# Patient Record
Sex: FEMALE | Race: WHITE | NOT HISPANIC OR LATINO | Employment: OTHER | URBAN - METROPOLITAN AREA
[De-identification: names, ages, dates, MRNs, and addresses within clinical notes are randomized per-mention and may not be internally consistent; named-entity substitution may affect disease eponyms.]

---

## 2017-05-11 ENCOUNTER — GENERIC CONVERSION - ENCOUNTER (OUTPATIENT)
Dept: OTHER | Facility: OTHER | Age: 62
End: 2017-05-11

## 2017-06-16 ENCOUNTER — ALLSCRIPTS OFFICE VISIT (OUTPATIENT)
Dept: OTHER | Facility: OTHER | Age: 62
End: 2017-06-16

## 2017-06-16 DIAGNOSIS — H33.22 SEROUS RETINAL DETACHMENT OF LEFT EYE: ICD-10-CM

## 2017-06-16 DIAGNOSIS — R73.01 IMPAIRED FASTING GLUCOSE: ICD-10-CM

## 2017-06-16 DIAGNOSIS — E78.5 HYPERLIPIDEMIA: ICD-10-CM

## 2017-06-26 ENCOUNTER — ALLSCRIPTS OFFICE VISIT (OUTPATIENT)
Dept: OTHER | Facility: OTHER | Age: 62
End: 2017-06-26

## 2017-06-26 LAB
BILIRUB UR QL STRIP: ABNORMAL
GLUCOSE (HISTORICAL): NEGATIVE
HGB UR QL STRIP.AUTO: ABNORMAL
KETONES UR STRIP-MCNC: NEGATIVE MG/DL
LEUKOCYTE ESTERASE UR QL STRIP: ABNORMAL
NITRITE UR QL STRIP: NEGATIVE
PH UR STRIP.AUTO: 5.5 [PH]
PROT UR STRIP-MCNC: NEGATIVE MG/DL
SP GR UR STRIP.AUTO: 1.02
UROBILINOGEN UR QL STRIP.AUTO: 0.2

## 2017-07-20 ENCOUNTER — LAB (OUTPATIENT)
Dept: LAB | Facility: CLINIC | Age: 62
End: 2017-07-20
Payer: COMMERCIAL

## 2017-07-20 ENCOUNTER — TRANSCRIBE ORDERS (OUTPATIENT)
Dept: LAB | Facility: CLINIC | Age: 62
End: 2017-07-20

## 2017-07-20 DIAGNOSIS — H33.22 SEROUS RETINAL DETACHMENT OF LEFT EYE: ICD-10-CM

## 2017-07-20 DIAGNOSIS — E78.5 HYPERLIPIDEMIA: ICD-10-CM

## 2017-07-20 DIAGNOSIS — R73.01 IMPAIRED FASTING GLUCOSE: ICD-10-CM

## 2017-07-20 LAB
ALBUMIN SERPL BCP-MCNC: 3.9 G/DL (ref 3.5–5)
ALP SERPL-CCNC: 71 U/L (ref 46–116)
ALT SERPL W P-5'-P-CCNC: 25 U/L (ref 12–78)
ANION GAP SERPL CALCULATED.3IONS-SCNC: 4 MMOL/L (ref 4–13)
AST SERPL W P-5'-P-CCNC: 14 U/L (ref 5–45)
BASOPHILS # BLD AUTO: 0.04 THOUSANDS/ΜL (ref 0–0.1)
BASOPHILS NFR BLD AUTO: 1 % (ref 0–1)
BILIRUB SERPL-MCNC: 1.47 MG/DL (ref 0.2–1)
BUN SERPL-MCNC: 18 MG/DL (ref 5–25)
CALCIUM SERPL-MCNC: 9.1 MG/DL (ref 8.3–10.1)
CHLORIDE SERPL-SCNC: 101 MMOL/L (ref 100–108)
CHOLEST SERPL-MCNC: 156 MG/DL (ref 50–200)
CO2 SERPL-SCNC: 31 MMOL/L (ref 21–32)
CREAT SERPL-MCNC: 0.69 MG/DL (ref 0.6–1.3)
EOSINOPHIL # BLD AUTO: 0.2 THOUSAND/ΜL (ref 0–0.61)
EOSINOPHIL NFR BLD AUTO: 2 % (ref 0–6)
ERYTHROCYTE [DISTWIDTH] IN BLOOD BY AUTOMATED COUNT: 14.5 % (ref 11.6–15.1)
EST. AVERAGE GLUCOSE BLD GHB EST-MCNC: 154 MG/DL
GFR SERPL CREATININE-BSD FRML MDRD: >60 ML/MIN/1.73SQ M
GLUCOSE P FAST SERPL-MCNC: 123 MG/DL (ref 65–99)
HBA1C MFR BLD: 7 % (ref 4.2–6.3)
HCT VFR BLD AUTO: 42 % (ref 34.8–46.1)
HDLC SERPL-MCNC: 38 MG/DL (ref 40–60)
HGB BLD-MCNC: 14.1 G/DL (ref 11.5–15.4)
LDLC SERPL CALC-MCNC: 70 MG/DL (ref 0–100)
LYMPHOCYTES # BLD AUTO: 2.73 THOUSANDS/ΜL (ref 0.6–4.47)
LYMPHOCYTES NFR BLD AUTO: 33 % (ref 14–44)
MCH RBC QN AUTO: 29.3 PG (ref 26.8–34.3)
MCHC RBC AUTO-ENTMCNC: 33.6 G/DL (ref 31.4–37.4)
MCV RBC AUTO: 87 FL (ref 82–98)
MONOCYTES # BLD AUTO: 0.54 THOUSAND/ΜL (ref 0.17–1.22)
MONOCYTES NFR BLD AUTO: 7 % (ref 4–12)
NEUTROPHILS # BLD AUTO: 4.7 THOUSANDS/ΜL (ref 1.85–7.62)
NEUTS SEG NFR BLD AUTO: 57 % (ref 43–75)
NRBC BLD AUTO-RTO: 0 /100 WBCS
PLATELET # BLD AUTO: 200 THOUSANDS/UL (ref 149–390)
PMV BLD AUTO: 12.1 FL (ref 8.9–12.7)
POTASSIUM SERPL-SCNC: 4.5 MMOL/L (ref 3.5–5.3)
PROT SERPL-MCNC: 7.3 G/DL (ref 6.4–8.2)
RBC # BLD AUTO: 4.81 MILLION/UL (ref 3.81–5.12)
SODIUM SERPL-SCNC: 136 MMOL/L (ref 136–145)
TRIGL SERPL-MCNC: 239 MG/DL
WBC # BLD AUTO: 8.22 THOUSAND/UL (ref 4.31–10.16)

## 2017-07-20 PROCEDURE — 83036 HEMOGLOBIN GLYCOSYLATED A1C: CPT

## 2017-07-20 PROCEDURE — 85025 COMPLETE CBC W/AUTO DIFF WBC: CPT

## 2017-07-20 PROCEDURE — 80053 COMPREHEN METABOLIC PANEL: CPT

## 2017-07-20 PROCEDURE — 80061 LIPID PANEL: CPT

## 2017-07-20 PROCEDURE — 36415 COLL VENOUS BLD VENIPUNCTURE: CPT

## 2017-07-26 ENCOUNTER — ALLSCRIPTS OFFICE VISIT (OUTPATIENT)
Dept: OTHER | Facility: OTHER | Age: 62
End: 2017-07-26

## 2017-07-26 DIAGNOSIS — R39.9 UNSPECIFIED SYMPTOMS AND SIGNS INVOLVING THE GENITOURINARY SYSTEM: ICD-10-CM

## 2017-07-26 DIAGNOSIS — I10 ESSENTIAL (PRIMARY) HYPERTENSION: ICD-10-CM

## 2017-07-26 DIAGNOSIS — R73.01 IMPAIRED FASTING GLUCOSE: ICD-10-CM

## 2017-08-07 ENCOUNTER — APPOINTMENT (OUTPATIENT)
Dept: LAB | Facility: CLINIC | Age: 62
End: 2017-08-07
Payer: COMMERCIAL

## 2017-08-07 DIAGNOSIS — R39.9 UNSPECIFIED SYMPTOMS AND SIGNS INVOLVING THE GENITOURINARY SYSTEM: ICD-10-CM

## 2017-08-07 LAB
BACTERIA UR QL AUTO: ABNORMAL /HPF
BILIRUB UR QL STRIP: NEGATIVE
CLARITY UR: ABNORMAL
COLOR UR: YELLOW
GLUCOSE UR STRIP-MCNC: NEGATIVE MG/DL
HGB UR QL STRIP.AUTO: ABNORMAL
HYALINE CASTS #/AREA URNS LPF: ABNORMAL /LPF
KETONES UR STRIP-MCNC: NEGATIVE MG/DL
LEUKOCYTE ESTERASE UR QL STRIP: ABNORMAL
NITRITE UR QL STRIP: POSITIVE
NON-SQ EPI CELLS URNS QL MICRO: ABNORMAL /HPF
PH UR STRIP.AUTO: 6 [PH] (ref 4.5–8)
PROT UR STRIP-MCNC: ABNORMAL MG/DL
RBC #/AREA URNS AUTO: ABNORMAL /HPF
SP GR UR STRIP.AUTO: 1.02 (ref 1–1.03)
UROBILINOGEN UR QL STRIP.AUTO: 0.2 E.U./DL
WBC #/AREA URNS AUTO: ABNORMAL /HPF

## 2017-08-07 PROCEDURE — 87077 CULTURE AEROBIC IDENTIFY: CPT

## 2017-08-07 PROCEDURE — 87086 URINE CULTURE/COLONY COUNT: CPT

## 2017-08-07 PROCEDURE — 81001 URINALYSIS AUTO W/SCOPE: CPT

## 2017-08-07 PROCEDURE — 87186 SC STD MICRODIL/AGAR DIL: CPT

## 2017-08-09 LAB — BACTERIA UR CULT: NORMAL

## 2017-08-10 ENCOUNTER — GENERIC CONVERSION - ENCOUNTER (OUTPATIENT)
Dept: OTHER | Facility: OTHER | Age: 62
End: 2017-08-10

## 2017-08-14 ENCOUNTER — ALLSCRIPTS OFFICE VISIT (OUTPATIENT)
Dept: OTHER | Facility: OTHER | Age: 62
End: 2017-08-14

## 2017-08-24 RX ORDER — SIMVASTATIN 40 MG
40 TABLET ORAL
COMMUNITY
End: 2018-09-20 | Stop reason: SDUPTHER

## 2017-08-24 RX ORDER — LOSARTAN POTASSIUM AND HYDROCHLOROTHIAZIDE 25; 100 MG/1; MG/1
1 TABLET ORAL DAILY
COMMUNITY
End: 2018-07-03 | Stop reason: SDUPTHER

## 2017-08-24 RX ORDER — ATENOLOL 100 MG/1
50 TABLET ORAL DAILY
COMMUNITY
End: 2018-09-20 | Stop reason: SDUPTHER

## 2017-08-28 ENCOUNTER — ANESTHESIA EVENT (OUTPATIENT)
Dept: PERIOP | Facility: HOSPITAL | Age: 62
End: 2017-08-28
Payer: COMMERCIAL

## 2017-08-29 ENCOUNTER — HOSPITAL ENCOUNTER (OUTPATIENT)
Facility: HOSPITAL | Age: 62
Setting detail: OUTPATIENT SURGERY
Discharge: HOME | End: 2017-08-29
Attending: INTERNAL MEDICINE | Admitting: INTERNAL MEDICINE
Payer: COMMERCIAL

## 2017-08-29 ENCOUNTER — GENERIC CONVERSION - ENCOUNTER (OUTPATIENT)
Dept: OTHER | Facility: OTHER | Age: 62
End: 2017-08-29

## 2017-08-29 ENCOUNTER — ANESTHESIA (OUTPATIENT)
Dept: PERIOP | Facility: HOSPITAL | Age: 62
End: 2017-08-29
Payer: COMMERCIAL

## 2017-08-29 VITALS
DIASTOLIC BLOOD PRESSURE: 98 MMHG | BODY MASS INDEX: 39.28 KG/M2 | RESPIRATION RATE: 16 BRPM | TEMPERATURE: 98.2 F | SYSTOLIC BLOOD PRESSURE: 153 MMHG | WEIGHT: 265.21 LBS | OXYGEN SATURATION: 96 % | HEIGHT: 69 IN | HEART RATE: 69 BPM

## 2017-08-29 DIAGNOSIS — Z12.11 ENCOUNTER FOR SCREENING FOR MALIGNANT NEOPLASM OF COLON: ICD-10-CM

## 2017-08-29 PROCEDURE — 88305 TISSUE EXAM BY PATHOLOGIST: CPT | Performed by: INTERNAL MEDICINE

## 2017-08-29 RX ORDER — GLYCOPYRROLATE 0.2 MG/ML
INJECTION INTRAMUSCULAR; INTRAVENOUS AS NEEDED
Status: DISCONTINUED | OUTPATIENT
Start: 2017-08-29 | End: 2017-08-29 | Stop reason: SURG

## 2017-08-29 RX ORDER — SODIUM CHLORIDE, SODIUM LACTATE, POTASSIUM CHLORIDE, CALCIUM CHLORIDE 600; 310; 30; 20 MG/100ML; MG/100ML; MG/100ML; MG/100ML
100 INJECTION, SOLUTION INTRAVENOUS CONTINUOUS
Status: DISCONTINUED | OUTPATIENT
Start: 2017-08-29 | End: 2017-08-29 | Stop reason: HOSPADM

## 2017-08-29 RX ORDER — PROPOFOL 10 MG/ML
INJECTION, EMULSION INTRAVENOUS AS NEEDED
Status: DISCONTINUED | OUTPATIENT
Start: 2017-08-29 | End: 2017-08-29 | Stop reason: SURG

## 2017-08-29 RX ADMIN — PROPOFOL 30 MG: 10 INJECTION, EMULSION INTRAVENOUS at 10:33

## 2017-08-29 RX ADMIN — SODIUM CHLORIDE, POTASSIUM CHLORIDE, SODIUM LACTATE AND CALCIUM CHLORIDE: 600; 310; 30; 20 INJECTION, SOLUTION INTRAVENOUS at 09:57

## 2017-08-29 RX ADMIN — GLYCOPYRROLATE 0.2 MG: 0.2 INJECTION, SOLUTION INTRAMUSCULAR; INTRAVENOUS at 10:28

## 2017-08-29 RX ADMIN — PROPOFOL 30 MG: 10 INJECTION, EMULSION INTRAVENOUS at 10:38

## 2017-08-29 RX ADMIN — GLYCOPYRROLATE 0.2 MG: 0.2 INJECTION, SOLUTION INTRAMUSCULAR; INTRAVENOUS at 10:32

## 2017-08-29 RX ADMIN — SODIUM CHLORIDE, POTASSIUM CHLORIDE, SODIUM LACTATE AND CALCIUM CHLORIDE 100 ML/HR: 600; 310; 30; 20 INJECTION, SOLUTION INTRAVENOUS at 09:29

## 2017-08-29 RX ADMIN — PROPOFOL 20 MG: 10 INJECTION, EMULSION INTRAVENOUS at 10:26

## 2017-08-29 RX ADMIN — PROPOFOL 100 MG: 10 INJECTION, EMULSION INTRAVENOUS at 10:22

## 2017-08-30 ENCOUNTER — GENERIC CONVERSION - ENCOUNTER (OUTPATIENT)
Dept: OTHER | Facility: OTHER | Age: 62
End: 2017-08-30

## 2017-09-13 ENCOUNTER — GENERIC CONVERSION - ENCOUNTER (OUTPATIENT)
Dept: OTHER | Facility: OTHER | Age: 62
End: 2017-09-13

## 2017-10-20 ENCOUNTER — LAB (OUTPATIENT)
Dept: LAB | Facility: CLINIC | Age: 62
End: 2017-10-20
Payer: COMMERCIAL

## 2017-10-20 DIAGNOSIS — I10 ESSENTIAL (PRIMARY) HYPERTENSION: ICD-10-CM

## 2017-10-20 DIAGNOSIS — R73.01 IMPAIRED FASTING GLUCOSE: ICD-10-CM

## 2017-10-20 LAB
ALBUMIN SERPL BCP-MCNC: 4 G/DL (ref 3.5–5)
ALP SERPL-CCNC: 68 U/L (ref 46–116)
ALT SERPL W P-5'-P-CCNC: 29 U/L (ref 12–78)
ANION GAP SERPL CALCULATED.3IONS-SCNC: 6 MMOL/L (ref 4–13)
AST SERPL W P-5'-P-CCNC: 16 U/L (ref 5–45)
BASOPHILS # BLD AUTO: 0.03 THOUSANDS/ΜL (ref 0–0.1)
BASOPHILS NFR BLD AUTO: 1 % (ref 0–1)
BILIRUB SERPL-MCNC: 1.63 MG/DL (ref 0.2–1)
BUN SERPL-MCNC: 13 MG/DL (ref 5–25)
CALCIUM SERPL-MCNC: 8.8 MG/DL (ref 8.3–10.1)
CHLORIDE SERPL-SCNC: 100 MMOL/L (ref 100–108)
CHOLEST SERPL-MCNC: 150 MG/DL (ref 50–200)
CO2 SERPL-SCNC: 29 MMOL/L (ref 21–32)
CREAT SERPL-MCNC: 0.6 MG/DL (ref 0.6–1.3)
EOSINOPHIL # BLD AUTO: 0.15 THOUSAND/ΜL (ref 0–0.61)
EOSINOPHIL NFR BLD AUTO: 2 % (ref 0–6)
ERYTHROCYTE [DISTWIDTH] IN BLOOD BY AUTOMATED COUNT: 13.7 % (ref 11.6–15.1)
EST. AVERAGE GLUCOSE BLD GHB EST-MCNC: 143 MG/DL
GFR SERPL CREATININE-BSD FRML MDRD: 97 ML/MIN/1.73SQ M
GLUCOSE P FAST SERPL-MCNC: 102 MG/DL (ref 65–99)
HBA1C MFR BLD: 6.6 % (ref 4.2–6.3)
HCT VFR BLD AUTO: 44.1 % (ref 34.8–46.1)
HDLC SERPL-MCNC: 40 MG/DL (ref 40–60)
HGB BLD-MCNC: 15.2 G/DL (ref 11.5–15.4)
LDLC SERPL CALC-MCNC: 66 MG/DL (ref 0–100)
LYMPHOCYTES # BLD AUTO: 2.43 THOUSANDS/ΜL (ref 0.6–4.47)
LYMPHOCYTES NFR BLD AUTO: 38 % (ref 14–44)
MCH RBC QN AUTO: 29.9 PG (ref 26.8–34.3)
MCHC RBC AUTO-ENTMCNC: 34.5 G/DL (ref 31.4–37.4)
MCV RBC AUTO: 87 FL (ref 82–98)
MONOCYTES # BLD AUTO: 0.48 THOUSAND/ΜL (ref 0.17–1.22)
MONOCYTES NFR BLD AUTO: 7 % (ref 4–12)
NEUTROPHILS # BLD AUTO: 3.35 THOUSANDS/ΜL (ref 1.85–7.62)
NEUTS SEG NFR BLD AUTO: 52 % (ref 43–75)
NRBC BLD AUTO-RTO: 0 /100 WBCS
PLATELET # BLD AUTO: 214 THOUSANDS/UL (ref 149–390)
PMV BLD AUTO: 12 FL (ref 8.9–12.7)
POTASSIUM SERPL-SCNC: 3.7 MMOL/L (ref 3.5–5.3)
PROT SERPL-MCNC: 7.5 G/DL (ref 6.4–8.2)
RBC # BLD AUTO: 5.08 MILLION/UL (ref 3.81–5.12)
SODIUM SERPL-SCNC: 135 MMOL/L (ref 136–145)
TRIGL SERPL-MCNC: 220 MG/DL
WBC # BLD AUTO: 6.45 THOUSAND/UL (ref 4.31–10.16)

## 2017-10-20 PROCEDURE — 36415 COLL VENOUS BLD VENIPUNCTURE: CPT

## 2017-10-20 PROCEDURE — 80061 LIPID PANEL: CPT

## 2017-10-20 PROCEDURE — 83036 HEMOGLOBIN GLYCOSYLATED A1C: CPT

## 2017-10-20 PROCEDURE — 80053 COMPREHEN METABOLIC PANEL: CPT

## 2017-10-20 PROCEDURE — 85025 COMPLETE CBC W/AUTO DIFF WBC: CPT

## 2017-10-22 ENCOUNTER — GENERIC CONVERSION - ENCOUNTER (OUTPATIENT)
Dept: OTHER | Facility: OTHER | Age: 62
End: 2017-10-22

## 2017-10-30 ENCOUNTER — ALLSCRIPTS OFFICE VISIT (OUTPATIENT)
Dept: OTHER | Facility: OTHER | Age: 62
End: 2017-10-30

## 2017-10-30 DIAGNOSIS — I10 ESSENTIAL (PRIMARY) HYPERTENSION: ICD-10-CM

## 2017-10-30 DIAGNOSIS — R73.01 IMPAIRED FASTING GLUCOSE: ICD-10-CM

## 2017-10-31 NOTE — PROGRESS NOTES
Assessment  1  Benign essential hypertension (401 1) (I10)   2  Hyperlipidemia (272 4) (E78 5)   3  Impaired fasting glucose (790 21) (R73 01)    Plan  Benign essential hypertension    · (1) CBC/PLT/DIFF; Status:Active; Requested for:30Oct2017;    · (1) COMPREHENSIVE METABOLIC PANEL; Status:Active; Requested for:30Oct2017;    · (1) LIPID PANEL, FASTING; Status:Active; Requested for:30Oct2017;   Impaired fasting glucose    · (1) HEMOGLOBIN A1C; Status:Active; Requested for:30Oct2017;    · Follow-up visit in 3 months Evaluation and Treatment  Follow-up  Status: Hold For - Scheduling   Requested for: [id number]  Need for influenza vaccination    · Fluzone Quadrivalent Intramuscular Suspension; INJECT 0 5  ML Intramuscular; To Be  Done: [id number]    Discussion/Summary  Discussion Summary:   Chronic problems are stable  No changes in meds  Followup with specialists for her eye  Continue diet and exercise  Counseling Documentation With Imm: The patient was counseled regarding instructions for management,-- impressions  Medication SE Review and Pt Understands Tx: Possible side effects of new medications were reviewed with the patient/guardian today  The treatment plan was reviewed with the patient/guardian  The patient/guardian understands and agrees with the treatment plan   Self Referrals:   Self Referrals: No      Chief Complaint  Chief Complaint Free Text Note Form: Patient returns today for a routine follow up and to review labs  History of Present Illness  HPI: Patient comes in today for routine follow-up  She has been working diligently on her weight and her diet  She has lost a few pounds  Her blood pressure has come down  Her weight has come down  Cholesterol has come down and sugars have improved  Everything all overall is better  Denies any new complaints today  Review of Systems  Complete-Female:   Cardiovascular: no chest pain  Respiratory: no shortness of breath     Gastrointestinal: no abdominal pain  Active Problems  1  Arthritis (716 90) (M19 90)   2  Benign essential hypertension (401 1) (I10)   3  Detached retina, left (361 9) (H33 22)   4  Hyperlipidemia (272 4) (E78 5)   5  Impaired fasting glucose (790 21) (R73 01)   6  Screen for colon cancer (V76 51) (Z12 11)   7  Varicose veins of lower extremity (454 9) (I83 90)    Past Medical History  1  History of Acute non-recurrent maxillary sinusitis (461 0) (J01 00)   2  Cataract, left (366 9) (H26 9)   3  History of recurrent UTI (urinary tract infection) (V13 02) (Z87 440)   4  History of Preop testing (V72 84) (Z01 818)   5  History of UTI symptoms (788 99) (R39 9)  Active Problems And Past Medical History Reviewed: The active problems and past medical history were reviewed and updated today  Surgical History  1  History of Neuroplasty Decompression Median Nerve At Carpal Tunnel   2  History of Repair Of Retinal Detachment Left Eye    Family History  Mother    1  Family history of coronary artery disease (V17 3) (Z82 49)   2  Family history of diabetes mellitus (V18 0) (Z83 3)  Father    3  Family history of chronic obstructive pulmonary disease (V17 6) (Z82 5)  Daughter    4  Family history of malignant melanoma (V16 8) (Z80 8)  Brother    5  Family history of coronary artery disease (V17 3) (Z82 49)   6  Family history of valvular heart disease (V17 49) (Z82 49)  Family History    7  Denied: Family history of mental disorder   8  Denied: Family history of substance abuse    Social History   · Brushes teeth twice a day   · Dental care, regularly   ·    · Engages in instrumental music (E30 10) (Z91 89)   · Exercise: Yardwork   · Exercises regularly   · Never smoker   · No illicit drug use   · Occasional alcohol use   · Retired   · Two children    Current Meds   1  Atenolol 50 MG Oral Tablet; take 2 tablet daily  Requested for: 59COS2060; Last Rx:16Jun2017   Ordered   2  Losartan Potassium-HCTZ 100-25 MG Oral Tablet;  Take 1 tablet by mouth  daily; Therapy: (Recorded:30Oct2017) to Recorded   3  Simvastatin 40 MG Oral Tablet; Take 1 tablet daily; Therapy: (Recorded:30Oct2017) to Recorded  Medication List Reviewed: The medication list was reviewed and updated today  Allergies  1  No Known Drug Allergies    Vitals  Vital Signs    Recorded: 05QEG8711 08:15AM   Temperature 98 F   Heart Rate 89   Systolic 438 mm Hg   Diastolic 88 mm Hg   Height 5 ft 9 in   Weight 264 lb    BMI Calculated 38 99 kg/m2   BSA Calculated 2 32 m2   O2 Saturation 94     Physical Exam    Constitutional   General appearance: No acute distress, well appearing and well nourished  Results/Data  (1) CBC/PLT/DIFF 20Oct2017 10:24AM Ravichristian Valdez Order Number: [id number]     Test Name Result Flag Reference   WBC COUNT 6 45 Thousand/uL  4 31-10 16   RBC COUNT 5 08 Million/uL  3 81-5 12   HEMOGLOBIN 15 2 g/dL  11 5-15 4   HEMATOCRIT 44 1 %  34 8-46  1   MCV 87 fL  82-98   MCH 29 9 pg  26 8-34 3   MCHC 34 5 g/dL  31 4-37 4   RDW 13 7 %  11 6-15 1   MPV 12 0 fL  8 9-12 7   PLATELET COUNT 800 Thousands/uL  149-390   nRBC AUTOMATED 0 /100 WBCs     NEUTROPHILS RELATIVE PERCENT 52 %  43-75   LYMPHOCYTES RELATIVE PERCENT 38 %  14-44   MONOCYTES RELATIVE PERCENT 7 %  4-12   EOSINOPHILS RELATIVE PERCENT 2 %  0-6   BASOPHILS RELATIVE PERCENT 1 %  0-1   NEUTROPHILS ABSOLUTE COUNT 3 35 Thousands/? ??L  1 85-7 62   LYMPHOCYTES ABSOLUTE COUNT 2 43 Thousands/? ??L  0 60-4 47   MONOCYTES ABSOLUTE COUNT 0 48 Thousand/? ??L  0 17-1 22   EOSINOPHILS ABSOLUTE COUNT 0 15 Thousand/? ??L  0 00-0 61   BASOPHILS ABSOLUTE COUNT 0 03 Thousands/? ??L  0 00-0 10     (1) COMPREHENSIVE METABOLIC PANEL [id number] 77:23XW Ravi Valdez Order Number: [id number]     Test Name Result Flag Reference   SODIUM 135 mmol/L L 136-145   POTASSIUM 3 7 mmol/L  3 5-5 3   CHLORIDE 100 mmol/L  100-108   CARBON DIOXIDE 29 mmol/L  21-32   ANION GAP (CALC) 6 mmol/L  4-13   BLOOD UREA NITROGEN 13 mg/dL  5-25   CREATININE 0 60 mg/dL  0 60-1 30   Standardized to IDMS reference method   CALCIUM 8 8 mg/dL  8 3-10 1   BILI, TOTAL 1 63 mg/dL H 0 20-1 00   ALK PHOSPHATAS 68 U/L     ALT (SGPT) 29 U/L  12-78   Specimen collection should occur prior to Sulfasalazine and/or Sulfapyridine administration due to the potential for falsely depressed results  AST(SGOT) 16 U/L  5-45   Specimen collection should occur prior to Sulfasalazine administration due to the potential for falsely depressed results  ALBUMIN 4 0 g/dL  3 5-5 0   TOTAL PROTEIN 7 5 g/dL  6 4-8 2   eGFR 97 ml/min/1 73sq m     National Kidney Disease Education Program recommendations are as follows:  GFR calculation is accurate only with a steady state creatinine  Chronic Kidney disease less than 60 ml/min/1 73 sq  meters  Kidney failure less than 15 ml/min/1 73 sq  meters  GLUCOSE FASTING 102 mg/dL H 65-99   Specimen collection should occur prior to Sulfasalazine administration due to the potential for falsely depressed results  Specimen collection should occur prior to Sulfapyridine administration due to the potential for falsely elevated results  (1) LIPID PANEL, FASTING [id number] 10:24AM [facility] Order Number: [id number]     Test Name Result Flag Reference   CHOLESTEROL 150 mg/dL     HDL,DIRECT 40 mg/dL  40-60   Specimen collection should occur prior to Metamizole administration due to the potential for falsley depressed results  LDL CHOLESTEROL CALCULATED 66 mg/dL  0-100   Triglyceride:        Normal <150 mg/dl   Borderline High 150-199 mg/dl   High 200-499 mg/dl   Very High >499 mg/dl      Cholesterol:       Desirable <200 mg/dl    Borderline High 200-239 mg/dl    High >239 mg/dl      HDL Cholesterol:       High>59 mg/dL    Low <41 mg/dL      This screening LDL is a calculated result     It does not have the accuracy of the Direct Measured LDL in the monitoring of patients with hyperlipidemia and/or statin therapy  Direct Measure LDL (RGB567) must be ordered separately in these patients  TRIGLYCERIDES 220 mg/dL H <=150   Specimen collection should occur prior to N-Acetylcysteine or Metamizole administration due to the potential for falsely depressed results  (1) HEMOGLOBIN A1C 20Oct2017 10:24AM Velma Joselo Order Number: [id number]     Test Name Result Flag Reference   HEMOGLOBIN A1C 6 6 % H 4 2-6 3   EST  AVG  GLUCOSE 143 mg/dl       Health Management  Screen for colon cancer   COLONOSCOPY; every 5 years; Last 29Aug2017; Next Due: 29Aug2022;  Active    Signatures   Electronically signed by : Anastacia Umana MD; Oct 30 2017  8:48AM EST                       (Author)

## 2018-01-10 NOTE — RESULT NOTES
Verified Results  (1) CBC/PLT/DIFF [id number] 10:24AM Alejandra Foster Order Number: [id number]     Test Name Result Flag Reference   WBC COUNT 6 45 Thousand/uL  4 31-10 16   RBC COUNT 5 08 Million/uL  3 81-5 12   HEMOGLOBIN 15 2 g/dL  11 5-15 4   HEMATOCRIT 44 1 %  34 8-46  1   MCV 87 fL  82-98   MCH 29 9 pg  26 8-34 3   MCHC 34 5 g/dL  31 4-37 4   RDW 13 7 %  11 6-15 1   MPV 12 0 fL  8 9-12 7   PLATELET COUNT 880 Thousands/uL  149-390   nRBC AUTOMATED 0 /100 WBCs     NEUTROPHILS RELATIVE PERCENT 52 %  43-75   LYMPHOCYTES RELATIVE PERCENT 38 %  14-44   MONOCYTES RELATIVE PERCENT 7 %  4-12   EOSINOPHILS RELATIVE PERCENT 2 %  0-6   BASOPHILS RELATIVE PERCENT 1 %  0-1   NEUTROPHILS ABSOLUTE COUNT 3 35 Thousands/? ??L  1 85-7 62   LYMPHOCYTES ABSOLUTE COUNT 2 43 Thousands/? ??L  0 60-4 47   MONOCYTES ABSOLUTE COUNT 0 48 Thousand/? ??L  0 17-1 22   EOSINOPHILS ABSOLUTE COUNT 0 15 Thousand/? ??L  0 00-0 61   BASOPHILS ABSOLUTE COUNT 0 03 Thousands/? ??L  0 00-0 10     (1) COMPREHENSIVE METABOLIC PANEL [id number] 86:99BX Alejandra Foster Order Number: [id number]     Test Name Result Flag Reference   SODIUM 135 mmol/L L 136-145   POTASSIUM 3 7 mmol/L  3 5-5 3   CHLORIDE 100 mmol/L  100-108   CARBON DIOXIDE 29 mmol/L  21-32   ANION GAP (CALC) 6 mmol/L  4-13   BLOOD UREA NITROGEN 13 mg/dL  5-25   CREATININE 0 60 mg/dL  0 60-1 30   Standardized to IDMS reference method   CALCIUM 8 8 mg/dL  8 3-10 1   BILI, TOTAL 1 63 mg/dL H 0 20-1 00   ALK PHOSPHATAS 68 U/L     ALT (SGPT) 29 U/L  12-78   Specimen collection should occur prior to Sulfasalazine and/or Sulfapyridine administration due to the potential for falsely depressed results  AST(SGOT) 16 U/L  5-45   Specimen collection should occur prior to Sulfasalazine administration due to the potential for falsely depressed results     ALBUMIN 4 0 g/dL  3 5-5 0   TOTAL PROTEIN 7 5 g/dL  6 4-8 2   eGFR 97 ml/min/1 73sq m     National Kidney Disease Education Program recommendations are as follows:  GFR calculation is accurate only with a steady state creatinine  Chronic Kidney disease less than 60 ml/min/1 73 sq  meters  Kidney failure less than 15 ml/min/1 73 sq  meters  GLUCOSE FASTING 102 mg/dL H 65-99   Specimen collection should occur prior to Sulfasalazine administration due to the potential for falsely depressed results  Specimen collection should occur prior to Sulfapyridine administration due to the potential for falsely elevated results  (1) LIPID PANEL, FASTING [id number] 10:24AM [employer] Order Number: [id number]     Test Name Result Flag Reference   CHOLESTEROL 150 mg/dL     HDL,DIRECT 40 mg/dL  40-60   Specimen collection should occur prior to Metamizole administration due to the potential for falsley depressed results  LDL CHOLESTEROL CALCULATED 66 mg/dL  0-100   Triglyceride:        Normal <150 mg/dl   Borderline High 150-199 mg/dl   High 200-499 mg/dl   Very High >499 mg/dl      Cholesterol:       Desirable <200 mg/dl    Borderline High 200-239 mg/dl    High >239 mg/dl      HDL Cholesterol:       High>59 mg/dL    Low <41 mg/dL      This screening LDL is a calculated result  It does not have the accuracy of the Direct Measured LDL in the monitoring of patients with hyperlipidemia and/or statin therapy  Direct Measure LDL (ANQ942) must be ordered separately in these patients  TRIGLYCERIDES 220 mg/dL H <=150   Specimen collection should occur prior to N-Acetylcysteine or Metamizole administration due to the potential for falsely depressed results  (1) HEMOGLOBIN A1C 20Oct2017 10:24AM [employer] Order Number: [id number]     Test Name Result Flag Reference   HEMOGLOBIN A1C 6 6 % H 4 2-6 3   EST  AVG   GLUCOSE 143 mg/dl

## 2018-01-12 VITALS
OXYGEN SATURATION: 97 % | HEIGHT: 69 IN | BODY MASS INDEX: 40.14 KG/M2 | DIASTOLIC BLOOD PRESSURE: 85 MMHG | WEIGHT: 271 LBS | HEART RATE: 76 BPM | SYSTOLIC BLOOD PRESSURE: 125 MMHG

## 2018-01-13 VITALS
BODY MASS INDEX: 39.55 KG/M2 | SYSTOLIC BLOOD PRESSURE: 140 MMHG | DIASTOLIC BLOOD PRESSURE: 88 MMHG | HEART RATE: 68 BPM | OXYGEN SATURATION: 98 % | TEMPERATURE: 97.9 F | HEIGHT: 69 IN | WEIGHT: 267 LBS

## 2018-01-13 VITALS
BODY MASS INDEX: 39.1 KG/M2 | HEIGHT: 69 IN | OXYGEN SATURATION: 94 % | DIASTOLIC BLOOD PRESSURE: 88 MMHG | WEIGHT: 264 LBS | HEART RATE: 89 BPM | TEMPERATURE: 98 F | SYSTOLIC BLOOD PRESSURE: 126 MMHG

## 2018-01-14 VITALS
DIASTOLIC BLOOD PRESSURE: 90 MMHG | HEIGHT: 69 IN | SYSTOLIC BLOOD PRESSURE: 140 MMHG | BODY MASS INDEX: 39.99 KG/M2 | HEART RATE: 70 BPM | OXYGEN SATURATION: 99 % | WEIGHT: 270 LBS

## 2018-03-16 ENCOUNTER — LAB (OUTPATIENT)
Dept: LAB | Facility: CLINIC | Age: 63
End: 2018-03-16
Payer: COMMERCIAL

## 2018-03-16 ENCOUNTER — TRANSCRIBE ORDERS (OUTPATIENT)
Dept: LAB | Facility: CLINIC | Age: 63
End: 2018-03-16

## 2018-03-16 DIAGNOSIS — E80.6 HYPERBILIRUBINEMIA: ICD-10-CM

## 2018-03-16 DIAGNOSIS — R73.01 IMPAIRED FASTING GLUCOSE: ICD-10-CM

## 2018-03-16 DIAGNOSIS — I10 ESSENTIAL (PRIMARY) HYPERTENSION: ICD-10-CM

## 2018-03-16 LAB
ALBUMIN SERPL BCP-MCNC: 4.1 G/DL (ref 3.5–5)
ALP SERPL-CCNC: 77 U/L (ref 46–116)
ALT SERPL W P-5'-P-CCNC: 28 U/L (ref 12–78)
ANION GAP SERPL CALCULATED.3IONS-SCNC: 6 MMOL/L (ref 4–13)
AST SERPL W P-5'-P-CCNC: 16 U/L (ref 5–45)
BASOPHILS # BLD AUTO: 0.04 THOUSANDS/ΜL (ref 0–0.1)
BASOPHILS NFR BLD AUTO: 1 % (ref 0–1)
BILIRUB SERPL-MCNC: 1.74 MG/DL (ref 0.2–1)
BUN SERPL-MCNC: 11 MG/DL (ref 5–25)
CALCIUM SERPL-MCNC: 8.8 MG/DL (ref 8.3–10.1)
CHLORIDE SERPL-SCNC: 100 MMOL/L (ref 100–108)
CHOLEST SERPL-MCNC: 149 MG/DL (ref 50–200)
CO2 SERPL-SCNC: 30 MMOL/L (ref 21–32)
CREAT SERPL-MCNC: 0.63 MG/DL (ref 0.6–1.3)
EOSINOPHIL # BLD AUTO: 0.11 THOUSAND/ΜL (ref 0–0.61)
EOSINOPHIL NFR BLD AUTO: 2 % (ref 0–6)
ERYTHROCYTE [DISTWIDTH] IN BLOOD BY AUTOMATED COUNT: 13.9 % (ref 11.6–15.1)
EST. AVERAGE GLUCOSE BLD GHB EST-MCNC: 131 MG/DL
GFR SERPL CREATININE-BSD FRML MDRD: 96 ML/MIN/1.73SQ M
GLUCOSE P FAST SERPL-MCNC: 111 MG/DL (ref 65–99)
HBA1C MFR BLD: 6.2 % (ref 4.2–6.3)
HCT VFR BLD AUTO: 43.7 % (ref 34.8–46.1)
HDLC SERPL-MCNC: 44 MG/DL (ref 40–60)
HGB BLD-MCNC: 15 G/DL (ref 11.5–15.4)
LDLC SERPL CALC-MCNC: 54 MG/DL (ref 0–100)
LYMPHOCYTES # BLD AUTO: 2.21 THOUSANDS/ΜL (ref 0.6–4.47)
LYMPHOCYTES NFR BLD AUTO: 33 % (ref 14–44)
MCH RBC QN AUTO: 29.8 PG (ref 26.8–34.3)
MCHC RBC AUTO-ENTMCNC: 34.3 G/DL (ref 31.4–37.4)
MCV RBC AUTO: 87 FL (ref 82–98)
MONOCYTES # BLD AUTO: 0.48 THOUSAND/ΜL (ref 0.17–1.22)
MONOCYTES NFR BLD AUTO: 7 % (ref 4–12)
NEUTROPHILS # BLD AUTO: 3.93 THOUSANDS/ΜL (ref 1.85–7.62)
NEUTS SEG NFR BLD AUTO: 57 % (ref 43–75)
NRBC BLD AUTO-RTO: 0 /100 WBCS
PLATELET # BLD AUTO: 201 THOUSANDS/UL (ref 149–390)
PMV BLD AUTO: 12.5 FL (ref 8.9–12.7)
POTASSIUM SERPL-SCNC: 4 MMOL/L (ref 3.5–5.3)
PROT SERPL-MCNC: 7.6 G/DL (ref 6.4–8.2)
RBC # BLD AUTO: 5.04 MILLION/UL (ref 3.81–5.12)
SODIUM SERPL-SCNC: 136 MMOL/L (ref 136–145)
TRIGL SERPL-MCNC: 253 MG/DL
WBC # BLD AUTO: 6.77 THOUSAND/UL (ref 4.31–10.16)

## 2018-03-16 PROCEDURE — 80053 COMPREHEN METABOLIC PANEL: CPT

## 2018-03-16 PROCEDURE — 83036 HEMOGLOBIN GLYCOSYLATED A1C: CPT

## 2018-03-16 PROCEDURE — 80061 LIPID PANEL: CPT

## 2018-03-16 PROCEDURE — 85025 COMPLETE CBC W/AUTO DIFF WBC: CPT

## 2018-03-20 ENCOUNTER — OFFICE VISIT (OUTPATIENT)
Dept: INTERNAL MEDICINE CLINIC | Facility: CLINIC | Age: 63
End: 2018-03-20
Payer: COMMERCIAL

## 2018-03-20 ENCOUNTER — TELEPHONE (OUTPATIENT)
Dept: INTERNAL MEDICINE CLINIC | Facility: CLINIC | Age: 63
End: 2018-03-20

## 2018-03-20 ENCOUNTER — LAB (OUTPATIENT)
Dept: LAB | Facility: CLINIC | Age: 63
End: 2018-03-20
Payer: COMMERCIAL

## 2018-03-20 VITALS
HEIGHT: 69 IN | RESPIRATION RATE: 18 BRPM | BODY MASS INDEX: 38.16 KG/M2 | DIASTOLIC BLOOD PRESSURE: 82 MMHG | SYSTOLIC BLOOD PRESSURE: 148 MMHG | HEART RATE: 68 BPM | OXYGEN SATURATION: 97 % | WEIGHT: 257.6 LBS

## 2018-03-20 DIAGNOSIS — E78.2 MIXED HYPERLIPIDEMIA: ICD-10-CM

## 2018-03-20 DIAGNOSIS — H33.22 DETACHED RETINA, LEFT: ICD-10-CM

## 2018-03-20 DIAGNOSIS — I10 BENIGN ESSENTIAL HYPERTENSION: Primary | ICD-10-CM

## 2018-03-20 DIAGNOSIS — R73.01 IMPAIRED FASTING GLUCOSE: ICD-10-CM

## 2018-03-20 DIAGNOSIS — E80.6 HYPERBILIRUBINEMIA: ICD-10-CM

## 2018-03-20 PROBLEM — E78.5 HYPERLIPIDEMIA: Status: ACTIVE | Noted: 2017-06-16

## 2018-03-20 PROBLEM — M19.90 ARTHRITIS: Status: ACTIVE | Noted: 2017-06-16

## 2018-03-20 PROBLEM — I83.90 VARICOSE VEINS OF LOWER EXTREMITY: Status: ACTIVE | Noted: 2017-06-16

## 2018-03-20 LAB
ALBUMIN SERPL BCP-MCNC: 4 G/DL (ref 3.5–5)
ALP SERPL-CCNC: 74 U/L (ref 46–116)
ALT SERPL W P-5'-P-CCNC: 28 U/L (ref 12–78)
AST SERPL W P-5'-P-CCNC: 17 U/L (ref 5–45)
BILIRUB DIRECT SERPL-MCNC: 0.29 MG/DL (ref 0–0.2)
BILIRUB SERPL-MCNC: 1.6 MG/DL (ref 0.2–1)
GGT SERPL-CCNC: 18 U/L (ref 5–85)
PROT SERPL-MCNC: 7.5 G/DL (ref 6.4–8.2)

## 2018-03-20 PROCEDURE — 82977 ASSAY OF GGT: CPT

## 2018-03-20 PROCEDURE — 99214 OFFICE O/P EST MOD 30 MIN: CPT | Performed by: INTERNAL MEDICINE

## 2018-03-20 PROCEDURE — 36415 COLL VENOUS BLD VENIPUNCTURE: CPT

## 2018-03-20 PROCEDURE — 80076 HEPATIC FUNCTION PANEL: CPT

## 2018-03-20 NOTE — TELEPHONE ENCOUNTER
Patient was checking out and mention blood work orders to be put into the system so she can complete them prior to her next visit in 6 months       So just whenever you have a moment

## 2018-03-20 NOTE — PROGRESS NOTES
Assessment/Plan:     Chronic problems are stable  Continue present medications  Continue diet and exercise  Ordered labs for next visit  Will repeat bilirubin today, nonfasting  Followup scheduled per orders  No problem-specific Assessment & Plan notes found for this encounter  There are no diagnoses linked to this encounter  Subjective:      Patient ID: Mariann Manzano is a 60 y o  female  Patient comes in today for routine follow-up  Her numbers are much better  Her sugars are weight down  She has been working on her diet  Blood pressure is okay  She is taking her medicine as directed  Cholesterol was controlled  Her total bilirubin was up slightly  This blood work is fasting  She has been working on her diet so has probable being fasting heavily  She continues to follow with her ophthalmologist for her detached retina but that is stable at this point  Denies any new complaints today          ALLERGIES:  No Known Allergies    CURRENT MEDICATIONS:    Current Outpatient Prescriptions:     atenolol (TENORMIN) 100 mg tablet, Take 50 mg by mouth daily, Disp: , Rfl:     losartan-hydrochlorothiazide (HYZAAR) 100-25 MG per tablet, Take 1 tablet by mouth daily, Disp: , Rfl:     simvastatin (ZOCOR) 40 mg tablet, Take 40 mg by mouth daily at bedtime, Disp: , Rfl:     ACTIVE PROBLEM LIST:  Patient Active Problem List   Diagnosis    Arthritis    Benign essential hypertension    Detached retina, left    Hyperlipidemia    Impaired fasting glucose    Varicose veins of lower extremity       PAST MEDICAL HISTORY:  Past Medical History:   Diagnosis Date    Hyperlipidemia     Hypertension        PAST SURGICAL HISTORY:  Past Surgical History:   Procedure Laterality Date    CARPAL TUNNEL RELEASE      CATARACT EXTRACTION      EYE SURGERY      ID COLONOSCOPY FLX DX W/COLLJ SPEC WHEN PFRMD N/A 8/29/2017    Procedure: COLONOSCOPY;  Surgeon: Mj Caal MD;  Location: [facility]; Service: Gastroenterology    RETINAL DETACHMENT SURGERY         FAMILY HISTORY:  No family history on file  SOCIAL HISTORY:  Social History     Social History    Marital status:      Spouse name: N/A    Number of children: N/A    Years of education: N/A     Occupational History    Not on file  Social History Main Topics    Smoking status: Never Smoker    Smokeless tobacco: Not on file    Alcohol use Yes      Comment: RARELY    Drug use: No    Sexual activity: Not on file     Other Topics Concern    Not on file     Social History Narrative    No narrative on file       Review of Systems   Respiratory: Negative for shortness of breath  Cardiovascular: Negative for chest pain  Gastrointestinal: Negative for abdominal pain  Objective:  Vitals:    03/20/18 1014   BP: 148/82   BP Location: Left arm   Patient Position: Sitting   Cuff Size: Large   Pulse: 68   Resp: 18   SpO2: 97%   Weight: 117 kg (257 lb 9 6 oz)   Height: 5' 9" (1 753 m)        Physical Exam   Constitutional: She is oriented to person, place, and time  She appears well-developed and well-nourished  Cardiovascular: Normal rate, regular rhythm and normal heart sounds  Pulmonary/Chest: Effort normal and breath sounds normal    Abdominal: Soft  There is no tenderness  Neurological: She is alert and oriented to person, place, and time  Nursing note and vitals reviewed          RESULTS:    Recent Results (from the past 1008 hour(s))   CBC and differential    Collection Time: 03/16/18  9:50 AM   Result Value Ref Range    WBC 6 77 4 31 - 10 16 Thousand/uL    RBC 5 04 3 81 - 5 12 Million/uL    Hemoglobin 15 0 11 5 - 15 4 g/dL    Hematocrit 43 7 34 8 - 46 1 %    MCV 87 82 - 98 fL    MCH 29 8 26 8 - 34 3 pg    MCHC 34 3 31 4 - 37 4 g/dL    RDW 13 9 11 6 - 15 1 %    MPV 12 5 8 9 - 12 7 fL    Platelets 684 169 - 473 Thousands/uL    nRBC 0 /100 WBCs    Neutrophils Relative 57 43 - 75 %    Lymphocytes Relative 33 14 - 44 % Monocytes Relative 7 4 - 12 %    Eosinophils Relative 2 0 - 6 %    Basophils Relative 1 0 - 1 %    Neutrophils Absolute 3 93 1 85 - 7 62 Thousands/µL    Lymphocytes Absolute 2 21 0 60 - 4 47 Thousands/µL    Monocytes Absolute 0 48 0 17 - 1 22 Thousand/µL    Eosinophils Absolute 0 11 0 00 - 0 61 Thousand/µL    Basophils Absolute 0 04 0 00 - 0 10 Thousands/µL   Comprehensive metabolic panel    Collection Time: 03/16/18  9:50 AM   Result Value Ref Range    Sodium 136 136 - 145 mmol/L    Potassium 4 0 3 5 - 5 3 mmol/L    Chloride 100 100 - 108 mmol/L    CO2 30 21 - 32 mmol/L    Anion Gap 6 4 - 13 mmol/L    BUN 11 5 - 25 mg/dL    Creatinine 0 63 0 60 - 1 30 mg/dL    Glucose, Fasting 111 (H) 65 - 99 mg/dL    Calcium 8 8 8 3 - 10 1 mg/dL    AST 16 5 - 45 U/L    ALT 28 12 - 78 U/L    Alkaline Phosphatase 77 46 - 116 U/L    Total Protein 7 6 6 4 - 8 2 g/dL    Albumin 4 1 3 5 - 5 0 g/dL    Total Bilirubin 1 74 (H) 0 20 - 1 00 mg/dL    eGFR 96 ml/min/1 73sq m   Lipid panel    Collection Time: 03/16/18  9:50 AM   Result Value Ref Range    Cholesterol 149 50 - 200 mg/dL    Triglycerides 253 (H) <=150 mg/dL    HDL, Direct 44 40 - 60 mg/dL    LDL Calculated 54 0 - 100 mg/dL   Hemoglobin A1c    Collection Time: 03/16/18  9:50 AM   Result Value Ref Range    Hemoglobin A1C 6 2 4 2 - 6 3 %     mg/dl

## 2018-05-27 DIAGNOSIS — I10 ESSENTIAL HYPERTENSION: Primary | ICD-10-CM

## 2018-05-28 RX ORDER — ATENOLOL 50 MG/1
TABLET ORAL
Qty: 180 TABLET | Refills: 1 | Status: SHIPPED | OUTPATIENT
Start: 2018-05-28 | End: 2018-09-20 | Stop reason: SDUPTHER

## 2018-07-03 DIAGNOSIS — I10 BENIGN ESSENTIAL HYPERTENSION: ICD-10-CM

## 2018-07-03 DIAGNOSIS — I10 BENIGN ESSENTIAL HYPERTENSION: Primary | ICD-10-CM

## 2018-07-03 RX ORDER — LOSARTAN POTASSIUM AND HYDROCHLOROTHIAZIDE 25; 100 MG/1; MG/1
1 TABLET ORAL DAILY
Qty: 90 TABLET | Refills: 1 | Status: SHIPPED | OUTPATIENT
Start: 2018-07-03 | End: 2018-09-20 | Stop reason: SDUPTHER

## 2018-07-03 RX ORDER — LOSARTAN POTASSIUM AND HYDROCHLOROTHIAZIDE 25; 100 MG/1; MG/1
1 TABLET ORAL DAILY
Qty: 30 TABLET | Refills: 5 | Status: SHIPPED | OUTPATIENT
Start: 2018-07-03 | End: 2018-07-03 | Stop reason: SDUPTHER

## 2018-09-20 ENCOUNTER — OFFICE VISIT (OUTPATIENT)
Dept: INTERNAL MEDICINE CLINIC | Facility: CLINIC | Age: 63
End: 2018-09-20
Payer: COMMERCIAL

## 2018-09-20 VITALS
RESPIRATION RATE: 16 BRPM | HEIGHT: 69 IN | BODY MASS INDEX: 37.71 KG/M2 | TEMPERATURE: 98 F | DIASTOLIC BLOOD PRESSURE: 86 MMHG | WEIGHT: 254.6 LBS | OXYGEN SATURATION: 97 % | SYSTOLIC BLOOD PRESSURE: 138 MMHG | HEART RATE: 57 BPM

## 2018-09-20 DIAGNOSIS — Z23 NEED FOR INFLUENZA VACCINATION: ICD-10-CM

## 2018-09-20 DIAGNOSIS — I10 BENIGN ESSENTIAL HYPERTENSION: ICD-10-CM

## 2018-09-20 DIAGNOSIS — R73.01 IMPAIRED FASTING GLUCOSE: ICD-10-CM

## 2018-09-20 DIAGNOSIS — E78.2 MIXED HYPERLIPIDEMIA: ICD-10-CM

## 2018-09-20 DIAGNOSIS — I10 ESSENTIAL HYPERTENSION: Primary | ICD-10-CM

## 2018-09-20 PROCEDURE — 90471 IMMUNIZATION ADMIN: CPT

## 2018-09-20 PROCEDURE — 3008F BODY MASS INDEX DOCD: CPT | Performed by: INTERNAL MEDICINE

## 2018-09-20 PROCEDURE — 99214 OFFICE O/P EST MOD 30 MIN: CPT | Performed by: INTERNAL MEDICINE

## 2018-09-20 PROCEDURE — 3079F DIAST BP 80-89 MM HG: CPT | Performed by: INTERNAL MEDICINE

## 2018-09-20 PROCEDURE — 1036F TOBACCO NON-USER: CPT | Performed by: INTERNAL MEDICINE

## 2018-09-20 PROCEDURE — 90682 RIV4 VACC RECOMBINANT DNA IM: CPT

## 2018-09-20 PROCEDURE — 3075F SYST BP GE 130 - 139MM HG: CPT | Performed by: INTERNAL MEDICINE

## 2018-09-20 RX ORDER — LOSARTAN POTASSIUM AND HYDROCHLOROTHIAZIDE 25; 100 MG/1; MG/1
1 TABLET ORAL DAILY
Qty: 90 TABLET | Refills: 1 | Status: SHIPPED | OUTPATIENT
Start: 2018-09-20 | End: 2019-01-04 | Stop reason: SDUPTHER

## 2018-09-20 RX ORDER — SIMVASTATIN 40 MG
40 TABLET ORAL
Qty: 90 TABLET | Refills: 1 | Status: SHIPPED | OUTPATIENT
Start: 2018-09-20 | End: 2019-01-04 | Stop reason: SDUPTHER

## 2018-09-20 RX ORDER — ATENOLOL 50 MG/1
100 TABLET ORAL DAILY
Qty: 180 TABLET | Refills: 1 | Status: SHIPPED | OUTPATIENT
Start: 2018-09-20 | End: 2019-03-07 | Stop reason: SDUPTHER

## 2018-09-20 NOTE — PROGRESS NOTES
Assessment/Plan:      Chronic problems appear stable but she is due for her blood work  There are still orders in there from March sewing encouraged her to get them done in the next week or 2  No medication changes for now  Encouraged diet and exercise  Return in about 6 months (around 3/20/2019)  No problem-specific Assessment & Plan notes found for this encounter  Diagnoses and all orders for this visit:    Essential hypertension  -     atenolol (TENORMIN) 50 mg tablet; Take 2 tablets (100 mg total) by mouth daily    Benign essential hypertension  -     losartan-hydrochlorothiazide (HYZAAR) 100-25 MG per tablet; Take 1 tablet by mouth daily    Mixed hyperlipidemia  -     simvastatin (ZOCOR) 40 mg tablet; Take 1 tablet (40 mg total) by mouth daily at bedtime    Impaired fasting glucose    Need for influenza vaccination  -     influenza vaccine, 3719-4563, quadrivalent, recombinant, PF, 0 5 mL, for patients 18 yr+ (FLUBLOK)          Subjective:      Patient ID: Zak Guerra is a 58 y o  female  Patient comes in today for routine follow-up  She states she is doing okay at this point  Her blood pressure is controlled  She is taking her medicine as directed  She did not get her blood work done but is trying to watch her diet for the cholesterol  Also trying to watch what sugar  That has been borderline  Her eye problems are doing better as well  She reports no new complaints  No new additions to her history          ALLERGIES:  No Known Allergies    CURRENT MEDICATIONS:    Current Outpatient Prescriptions:     atenolol (TENORMIN) 50 mg tablet, Take 2 tablets (100 mg total) by mouth daily, Disp: 180 tablet, Rfl: 1    losartan-hydrochlorothiazide (HYZAAR) 100-25 MG per tablet, Take 1 tablet by mouth daily, Disp: 90 tablet, Rfl: 1    simvastatin (ZOCOR) 40 mg tablet, Take 1 tablet (40 mg total) by mouth daily at bedtime, Disp: 90 tablet, Rfl: 1    ACTIVE PROBLEM LIST:  Patient Active Problem List   Diagnosis    Arthritis    Benign essential hypertension    Detached retina, left    Hyperlipidemia    Impaired fasting glucose    Varicose veins of lower extremity       PAST MEDICAL HISTORY:  Past Medical History:   Diagnosis Date    Cataract     07/16/2017    Hyperlipidemia     Hypertension        PAST SURGICAL HISTORY:  Past Surgical History:   Procedure Laterality Date    CARPAL TUNNEL RELEASE      CATARACT EXTRACTION Left     EYE SURGERY      NC COLONOSCOPY FLX DX W/COLLJ SPEC WHEN PFRMD N/A 8/29/2017    Procedure: COLONOSCOPY;  Surgeon: Joleen Love MD;  Location: [facility]; Service: Gastroenterology    RETINAL DETACHMENT SURGERY Left        FAMILY HISTORY:  Family History   Problem Relation Age of Onset    Coronary artery disease Mother     Diabetes Mother     COPD Father     Coronary artery disease Brother     Valvular heart disease Brother     Cancer Daughter        SOCIAL HISTORY:  Social History     Social History    Marital status:      Spouse name: N/A    Number of children: 2    Years of education: N/A     Occupational History           retired      Social History Main Topics    Smoking status: Never Smoker    Smokeless tobacco: Never Used    Alcohol use Yes      Comment: RARELY, glass of wine q 2 weeks     Drug use: No    Sexual activity: Yes     Partners: Male     Other Topics Concern    Not on file     Social History Narrative    Brushes teeth twice a day    Dental care    Engages in instrumental music     Exercise: Yard work     Exercises reg,            Review of Systems   Respiratory: Negative for shortness of breath  Cardiovascular: Negative for chest pain  Gastrointestinal: Negative for abdominal pain           Objective:  Vitals:    09/20/18 0817   BP: 138/86   BP Location: Left arm   Patient Position: Sitting   Pulse: 57   Resp: 16   Temp: 98 °F (36 7 °C)   TempSrc: Temporal   SpO2: 97%   Weight: 115 kg (254 lb 9 6 oz)   Height: 5' 9" (1 753 m)        Physical Exam   Constitutional: She is oriented to person, place, and time  She appears well-developed and well-nourished  Cardiovascular: Normal rate, regular rhythm and normal heart sounds  Pulmonary/Chest: Effort normal and breath sounds normal    Abdominal: Soft  There is no tenderness  Neurological: She is alert and oriented to person, place, and time  Nursing note and vitals reviewed  RESULTS:    No results found for this or any previous visit (from the past 1008 hour(s))  This note was created with voice recognition software  Phonic, grammatical and spelling errors may be present within the note as a result

## 2018-10-19 ENCOUNTER — TRANSCRIBE ORDERS (OUTPATIENT)
Dept: LAB | Facility: CLINIC | Age: 63
End: 2018-10-19

## 2018-10-19 ENCOUNTER — LAB (OUTPATIENT)
Dept: LAB | Facility: CLINIC | Age: 63
End: 2018-10-19
Payer: COMMERCIAL

## 2018-10-19 DIAGNOSIS — R73.01 IMPAIRED FASTING GLUCOSE: ICD-10-CM

## 2018-10-19 DIAGNOSIS — E78.2 MIXED HYPERLIPIDEMIA: ICD-10-CM

## 2018-10-19 DIAGNOSIS — I10 BENIGN ESSENTIAL HYPERTENSION: ICD-10-CM

## 2018-10-19 LAB
ALBUMIN SERPL BCP-MCNC: 3.8 G/DL (ref 3.5–5)
ALP SERPL-CCNC: 66 U/L (ref 46–116)
ALT SERPL W P-5'-P-CCNC: 27 U/L (ref 12–78)
ANION GAP SERPL CALCULATED.3IONS-SCNC: 7 MMOL/L (ref 4–13)
AST SERPL W P-5'-P-CCNC: 20 U/L (ref 5–45)
BASOPHILS # BLD AUTO: 0.05 THOUSANDS/ΜL (ref 0–0.1)
BASOPHILS NFR BLD AUTO: 1 % (ref 0–1)
BILIRUB SERPL-MCNC: 1.36 MG/DL (ref 0.2–1)
BUN SERPL-MCNC: 17 MG/DL (ref 5–25)
CALCIUM SERPL-MCNC: 9 MG/DL (ref 8.3–10.1)
CHLORIDE SERPL-SCNC: 102 MMOL/L (ref 100–108)
CHOLEST SERPL-MCNC: 143 MG/DL (ref 50–200)
CO2 SERPL-SCNC: 28 MMOL/L (ref 21–32)
CREAT SERPL-MCNC: 0.7 MG/DL (ref 0.6–1.3)
EOSINOPHIL # BLD AUTO: 0.1 THOUSAND/ΜL (ref 0–0.61)
EOSINOPHIL NFR BLD AUTO: 2 % (ref 0–6)
ERYTHROCYTE [DISTWIDTH] IN BLOOD BY AUTOMATED COUNT: 13.4 % (ref 11.6–15.1)
GFR SERPL CREATININE-BSD FRML MDRD: 92 ML/MIN/1.73SQ M
GLUCOSE P FAST SERPL-MCNC: 108 MG/DL (ref 65–99)
HCT VFR BLD AUTO: 43 % (ref 34.8–46.1)
HDLC SERPL-MCNC: 35 MG/DL (ref 40–60)
HGB BLD-MCNC: 14.3 G/DL (ref 11.5–15.4)
IMM GRANULOCYTES # BLD AUTO: 0.02 THOUSAND/UL (ref 0–0.2)
IMM GRANULOCYTES NFR BLD AUTO: 0 % (ref 0–2)
LDLC SERPL CALC-MCNC: 69 MG/DL (ref 0–100)
LYMPHOCYTES # BLD AUTO: 2.09 THOUSANDS/ΜL (ref 0.6–4.47)
LYMPHOCYTES NFR BLD AUTO: 33 % (ref 14–44)
MCH RBC QN AUTO: 29.4 PG (ref 26.8–34.3)
MCHC RBC AUTO-ENTMCNC: 33.3 G/DL (ref 31.4–37.4)
MCV RBC AUTO: 88 FL (ref 82–98)
MONOCYTES # BLD AUTO: 0.47 THOUSAND/ΜL (ref 0.17–1.22)
MONOCYTES NFR BLD AUTO: 7 % (ref 4–12)
NEUTROPHILS # BLD AUTO: 3.59 THOUSANDS/ΜL (ref 1.85–7.62)
NEUTS SEG NFR BLD AUTO: 57 % (ref 43–75)
NONHDLC SERPL-MCNC: 108 MG/DL
NRBC BLD AUTO-RTO: 0 /100 WBCS
PLATELET # BLD AUTO: 205 THOUSANDS/UL (ref 149–390)
PMV BLD AUTO: 12.4 FL (ref 8.9–12.7)
POTASSIUM SERPL-SCNC: 3.9 MMOL/L (ref 3.5–5.3)
PROT SERPL-MCNC: 7.1 G/DL (ref 6.4–8.2)
RBC # BLD AUTO: 4.87 MILLION/UL (ref 3.81–5.12)
SODIUM SERPL-SCNC: 137 MMOL/L (ref 136–145)
TRIGL SERPL-MCNC: 194 MG/DL
WBC # BLD AUTO: 6.32 THOUSAND/UL (ref 4.31–10.16)

## 2018-10-19 PROCEDURE — 80061 LIPID PANEL: CPT

## 2018-10-19 PROCEDURE — 83036 HEMOGLOBIN GLYCOSYLATED A1C: CPT

## 2018-10-19 PROCEDURE — 85025 COMPLETE CBC W/AUTO DIFF WBC: CPT

## 2018-10-19 PROCEDURE — 36415 COLL VENOUS BLD VENIPUNCTURE: CPT

## 2018-10-19 PROCEDURE — 80053 COMPREHEN METABOLIC PANEL: CPT

## 2018-10-20 LAB
EST. AVERAGE GLUCOSE BLD GHB EST-MCNC: 126 MG/DL
HBA1C MFR BLD: 6 % (ref 4.2–6.3)

## 2018-11-24 DIAGNOSIS — I10 ESSENTIAL HYPERTENSION: ICD-10-CM

## 2018-11-25 RX ORDER — ATENOLOL 50 MG/1
TABLET ORAL
Qty: 180 TABLET | Refills: 1 | Status: SHIPPED | OUTPATIENT
Start: 2018-11-25 | End: 2019-01-04 | Stop reason: ALTCHOICE

## 2019-01-04 ENCOUNTER — TELEPHONE (OUTPATIENT)
Dept: INTERNAL MEDICINE CLINIC | Facility: CLINIC | Age: 64
End: 2019-01-04

## 2019-01-04 DIAGNOSIS — E78.2 MIXED HYPERLIPIDEMIA: ICD-10-CM

## 2019-01-04 DIAGNOSIS — I10 BENIGN ESSENTIAL HYPERTENSION: ICD-10-CM

## 2019-01-04 RX ORDER — LOSARTAN POTASSIUM AND HYDROCHLOROTHIAZIDE 25; 100 MG/1; MG/1
1 TABLET ORAL DAILY
Qty: 90 TABLET | Refills: 1 | Status: SHIPPED | OUTPATIENT
Start: 2019-01-04 | End: 2019-03-07 | Stop reason: SDUPTHER

## 2019-01-04 RX ORDER — SIMVASTATIN 40 MG
40 TABLET ORAL
Qty: 90 TABLET | Refills: 1 | Status: SHIPPED | OUTPATIENT
Start: 2019-01-04 | End: 2019-03-07 | Stop reason: SDUPTHER

## 2019-01-04 NOTE — TELEPHONE ENCOUNTER
Pt is requesting medication     Losartan-hydrochlorothiazide (HYZAAR) 100-25 MG per tablet - 90 tablet  Simvastatin (ZOCOR) 40 mg tablet - 90 tablet    [facility]

## 2019-03-07 DIAGNOSIS — I10 ESSENTIAL HYPERTENSION: ICD-10-CM

## 2019-03-07 DIAGNOSIS — E78.2 MIXED HYPERLIPIDEMIA: ICD-10-CM

## 2019-03-07 DIAGNOSIS — I10 BENIGN ESSENTIAL HYPERTENSION: ICD-10-CM

## 2019-03-07 RX ORDER — ATENOLOL 50 MG/1
100 TABLET ORAL DAILY
Qty: 90 TABLET | Refills: 1 | Status: SHIPPED | OUTPATIENT
Start: 2019-03-07 | End: 2019-12-29 | Stop reason: SDUPTHER

## 2019-03-07 RX ORDER — SIMVASTATIN 40 MG
40 TABLET ORAL
Qty: 90 TABLET | Refills: 1 | Status: SHIPPED | OUTPATIENT
Start: 2019-03-07 | End: 2019-03-19 | Stop reason: ALTCHOICE

## 2019-03-07 RX ORDER — LOSARTAN POTASSIUM AND HYDROCHLOROTHIAZIDE 25; 100 MG/1; MG/1
1 TABLET ORAL DAILY
Qty: 90 TABLET | Refills: 1 | Status: SHIPPED | OUTPATIENT
Start: 2019-03-07 | End: 2019-03-19 | Stop reason: ALTCHOICE

## 2019-03-19 DIAGNOSIS — I10 BENIGN ESSENTIAL HYPERTENSION: ICD-10-CM

## 2019-03-19 DIAGNOSIS — E78.2 MIXED HYPERLIPIDEMIA: ICD-10-CM

## 2019-03-19 RX ORDER — SIMVASTATIN 40 MG
TABLET ORAL
Qty: 90 TABLET | Refills: 1 | Status: SHIPPED | OUTPATIENT
Start: 2019-03-19 | End: 2019-08-19 | Stop reason: SDUPTHER

## 2019-03-19 RX ORDER — LOSARTAN POTASSIUM AND HYDROCHLOROTHIAZIDE 25; 100 MG/1; MG/1
TABLET ORAL
Qty: 90 TABLET | Refills: 1 | Status: SHIPPED | OUTPATIENT
Start: 2019-03-19 | End: 2019-12-30 | Stop reason: SDUPTHER

## 2019-03-28 ENCOUNTER — OFFICE VISIT (OUTPATIENT)
Dept: INTERNAL MEDICINE CLINIC | Facility: CLINIC | Age: 64
End: 2019-03-28
Payer: MEDICARE

## 2019-03-28 VITALS
OXYGEN SATURATION: 97 % | HEART RATE: 68 BPM | HEIGHT: 69 IN | WEIGHT: 264 LBS | RESPIRATION RATE: 18 BRPM | DIASTOLIC BLOOD PRESSURE: 86 MMHG | BODY MASS INDEX: 39.1 KG/M2 | SYSTOLIC BLOOD PRESSURE: 138 MMHG

## 2019-03-28 DIAGNOSIS — H33.22 DETACHED RETINA, LEFT: ICD-10-CM

## 2019-03-28 DIAGNOSIS — Z11.59 NEED FOR HEPATITIS C SCREENING TEST: ICD-10-CM

## 2019-03-28 DIAGNOSIS — I10 BENIGN ESSENTIAL HYPERTENSION: Primary | ICD-10-CM

## 2019-03-28 DIAGNOSIS — R73.01 IMPAIRED FASTING GLUCOSE: ICD-10-CM

## 2019-03-28 PROBLEM — E78.2 MIXED HYPERLIPIDEMIA: Status: ACTIVE | Noted: 2017-06-16

## 2019-03-28 PROCEDURE — 99214 OFFICE O/P EST MOD 30 MIN: CPT | Performed by: INTERNAL MEDICINE

## 2019-03-28 RX ORDER — PREDNISOLONE ACETATE 10 MG/ML
SUSPENSION/ DROPS OPHTHALMIC DAILY
Refills: 1 | COMMUNITY
Start: 2019-02-23

## 2019-03-28 NOTE — PROGRESS NOTES
Assessment/Plan:     Chronic problems are stable  Continue present medications  Continue diet and exercise  Ordered labs for next visit  BMI Counseling: Body mass index is 38 99 kg/m²  Discussed the patient's BMI with her  The BMI is above average  BMI counseling and education was provided to the patient  Exercise recommendations include exercising 3-5 times per week  Return in about 6 months (around 9/28/2019) for Regular visit and Medicare Wellness  No problem-specific Assessment & Plan notes found for this encounter  Diagnoses and all orders for this visit:    Benign essential hypertension  -     Comprehensive metabolic panel; Future  -     CBC and differential; Future  -     Lipid panel; Future    Impaired fasting glucose  -     Hemoglobin A1C; Future    Detached retina, left    Need for hepatitis C screening test  -     Hepatitis C antibody; Future    Other orders  -     prednisoLONE acetate (PRED FORTE) 1 % ophthalmic suspension; INSTILL ONE DROP IN LEFT EYE TWICE A DAY          Subjective:      Patient ID: Orlando Nicolas is a 58 y o  female  Patient comes in today for routine follow-up  She states she is doing well with no new complaints  Her blood pressure was initially a little high but came down upon repeat  She admits she is a little nervous today because it is weigh in day at [employer]  Her blood work last time showed good control of her sugar and cholesterol  She is taking her medicine as directed  She continues to follow with the eye doctor  No further additions to her history        ALLERGIES:  No Known Allergies    CURRENT MEDICATIONS:    Current Outpatient Medications:     atenolol (TENORMIN) 50 mg tablet, Take 2 tablets (100 mg total) by mouth daily, Disp: 90 tablet, Rfl: 1    losartan-hydrochlorothiazide (HYZAAR) 100-25 MG per tablet, TAKE 1 TABLET DAILY, Disp: 90 tablet, Rfl: 1    prednisoLONE acetate (PRED FORTE) 1 % ophthalmic suspension, INSTILL ONE DROP IN LEFT EYE TWICE A DAY, Disp: , Rfl: 1    simvastatin (ZOCOR) 40 mg tablet, TAKE 1 TABLET DAILY AT BEDTIME, Disp: 90 tablet, Rfl: 1    ACTIVE PROBLEM LIST:  Patient Active Problem List   Diagnosis    Arthritis    Benign essential hypertension    Detached retina, left    Mixed hyperlipidemia    Impaired fasting glucose    Varicose veins of lower extremity       PAST MEDICAL HISTORY:  Past Medical History:   Diagnosis Date    Cataract     07/16/2017    Hyperlipidemia     Hypertension        PAST SURGICAL HISTORY:  Past Surgical History:   Procedure Laterality Date    CARPAL TUNNEL RELEASE      CATARACT EXTRACTION Left     EYE SURGERY      OK COLONOSCOPY FLX DX W/COLLJ SPEC WHEN PFRMD N/A 8/29/2017    Procedure: COLONOSCOPY;  Surgeon: Deigo Mensah MD;  Location: [facility];   Service: Gastroenterology    RETINAL DETACHMENT SURGERY Left        FAMILY HISTORY:  Family History   Problem Relation Age of Onset    Coronary artery disease Mother     Diabetes Mother     COPD Father     Coronary artery disease Brother     Valvular heart disease Brother     Cancer Daughter        SOCIAL HISTORY:  Social History     Socioeconomic History    Marital status:      Spouse name: Not on file    Number of children: 2    Years of education: Not on file    Highest education level: Not on file   Occupational History    Occupation:       Comment: retired    Social Needs    Financial resource strain: Not on file    Food insecurity:     Worry: Not on file     Inability: Not on file   [employer] needs:     Medical: Not on file     Non-medical: Not on file   Tobacco Use    Smoking status: Never Smoker    Smokeless tobacco: Never Used   Substance and Sexual Activity    Alcohol use: Yes     Comment: RARELY, glass of wine q 2 weeks     Drug use: No    Sexual activity: Yes     Partners: Male   Lifestyle    Physical activity:     Days per week: Not on file     Minutes per session: Not on file    Stress: Not on file   Relationships    Social connections:     Talks on phone: Not on file     Gets together: Not on file     Attends Confucianism service: Not on file     Active member of club or organization: Not on file     Attends meetings of clubs or organizations: Not on file     Relationship status: Not on file    Intimate partner violence:     Fear of current or ex partner: Not on file     Emotionally abused: Not on file     Physically abused: Not on file     Forced sexual activity: Not on file   Other Topics Concern    Not on file   Social History Narrative    Brushes teeth twice a day    Dental care    Engages in instrumental music     Exercise: Yard work     Exercises reg,        Review of Systems   Respiratory: Negative for shortness of breath  Cardiovascular: Negative for chest pain  Gastrointestinal: Negative for abdominal pain  Objective:  Vitals:    03/28/19 0812 03/28/19 0841   BP: 140/94 138/86   BP Location: Left arm    Patient Position: Sitting    Cuff Size: Standard    Pulse: 68    Resp: 18    SpO2: 97%    Weight: 120 kg (264 lb)    Height: 5' 9" (1 753 m)      Body mass index is 38 99 kg/m²  Physical Exam   Constitutional: She is oriented to person, place, and time  She appears well-developed and well-nourished  Cardiovascular: Normal rate, regular rhythm and normal heart sounds  Pulmonary/Chest: Effort normal and breath sounds normal    Abdominal: Soft  There is no tenderness  Neurological: She is alert and oriented to person, place, and time  Nursing note and vitals reviewed  RESULTS:    No results found for this or any previous visit (from the past 1008 hour(s))  This note was created with voice recognition software  Phonic, grammatical and spelling errors may be present within the note as a result

## 2019-05-01 DIAGNOSIS — Z78.9 HISTORY OF MEASLES, MUMPS, RUBELLA (MMR) VACCINATION UNKNOWN: Primary | ICD-10-CM

## 2019-05-02 ENCOUNTER — APPOINTMENT (OUTPATIENT)
Dept: LAB | Facility: CLINIC | Age: 64
End: 2019-05-02
Payer: MEDICARE

## 2019-05-02 ENCOUNTER — TRANSCRIBE ORDERS (OUTPATIENT)
Dept: LAB | Facility: CLINIC | Age: 64
End: 2019-05-02

## 2019-05-02 DIAGNOSIS — R73.01 IMPAIRED FASTING GLUCOSE: ICD-10-CM

## 2019-05-02 DIAGNOSIS — Z11.59 NEED FOR HEPATITIS C SCREENING TEST: ICD-10-CM

## 2019-05-02 DIAGNOSIS — Z92.29: Primary | ICD-10-CM

## 2019-05-02 DIAGNOSIS — I10 BENIGN ESSENTIAL HYPERTENSION: ICD-10-CM

## 2019-05-02 LAB
ALBUMIN SERPL BCP-MCNC: 4 G/DL (ref 3.5–5)
ALP SERPL-CCNC: 66 U/L (ref 46–116)
ALT SERPL W P-5'-P-CCNC: 27 U/L (ref 12–78)
ANION GAP SERPL CALCULATED.3IONS-SCNC: 7 MMOL/L (ref 4–13)
AST SERPL W P-5'-P-CCNC: 19 U/L (ref 5–45)
BASOPHILS # BLD AUTO: 0.06 THOUSANDS/ΜL (ref 0–0.1)
BASOPHILS NFR BLD AUTO: 1 % (ref 0–1)
BILIRUB SERPL-MCNC: 1.58 MG/DL (ref 0.2–1)
BUN SERPL-MCNC: 15 MG/DL (ref 5–25)
CALCIUM SERPL-MCNC: 8.9 MG/DL (ref 8.3–10.1)
CHLORIDE SERPL-SCNC: 103 MMOL/L (ref 100–108)
CHOLEST SERPL-MCNC: 154 MG/DL (ref 50–200)
CO2 SERPL-SCNC: 28 MMOL/L (ref 21–32)
CREAT SERPL-MCNC: 0.66 MG/DL (ref 0.6–1.3)
EOSINOPHIL # BLD AUTO: 0.11 THOUSAND/ΜL (ref 0–0.61)
EOSINOPHIL NFR BLD AUTO: 2 % (ref 0–6)
ERYTHROCYTE [DISTWIDTH] IN BLOOD BY AUTOMATED COUNT: 13.4 % (ref 11.6–15.1)
EST. AVERAGE GLUCOSE BLD GHB EST-MCNC: 148 MG/DL
GFR SERPL CREATININE-BSD FRML MDRD: 93 ML/MIN/1.73SQ M
GLUCOSE P FAST SERPL-MCNC: 116 MG/DL (ref 65–99)
HBA1C MFR BLD: 6.8 % (ref 4.2–6.3)
HCT VFR BLD AUTO: 44.2 % (ref 34.8–46.1)
HCV AB SER QL: NORMAL
HDLC SERPL-MCNC: 39 MG/DL (ref 40–60)
HGB BLD-MCNC: 14.6 G/DL (ref 11.5–15.4)
IMM GRANULOCYTES # BLD AUTO: 0.01 THOUSAND/UL (ref 0–0.2)
IMM GRANULOCYTES NFR BLD AUTO: 0 % (ref 0–2)
LDLC SERPL CALC-MCNC: 67 MG/DL (ref 0–100)
LYMPHOCYTES # BLD AUTO: 1.94 THOUSANDS/ΜL (ref 0.6–4.47)
LYMPHOCYTES NFR BLD AUTO: 33 % (ref 14–44)
MCH RBC QN AUTO: 29.5 PG (ref 26.8–34.3)
MCHC RBC AUTO-ENTMCNC: 33 G/DL (ref 31.4–37.4)
MCV RBC AUTO: 89 FL (ref 82–98)
MONOCYTES # BLD AUTO: 0.39 THOUSAND/ΜL (ref 0.17–1.22)
MONOCYTES NFR BLD AUTO: 7 % (ref 4–12)
NEUTROPHILS # BLD AUTO: 3.37 THOUSANDS/ΜL (ref 1.85–7.62)
NEUTS SEG NFR BLD AUTO: 57 % (ref 43–75)
NONHDLC SERPL-MCNC: 115 MG/DL
NRBC BLD AUTO-RTO: 0 /100 WBCS
PLATELET # BLD AUTO: 178 THOUSANDS/UL (ref 149–390)
PMV BLD AUTO: 12.8 FL (ref 8.9–12.7)
POTASSIUM SERPL-SCNC: 3.9 MMOL/L (ref 3.5–5.3)
PROT SERPL-MCNC: 7.3 G/DL (ref 6.4–8.2)
RBC # BLD AUTO: 4.95 MILLION/UL (ref 3.81–5.12)
RUBV IGG SERPL IA-ACNC: >175 IU/ML
SODIUM SERPL-SCNC: 138 MMOL/L (ref 136–145)
TRIGL SERPL-MCNC: 240 MG/DL
WBC # BLD AUTO: 5.88 THOUSAND/UL (ref 4.31–10.16)

## 2019-05-02 PROCEDURE — 85025 COMPLETE CBC W/AUTO DIFF WBC: CPT

## 2019-05-02 PROCEDURE — 80053 COMPREHEN METABOLIC PANEL: CPT

## 2019-05-02 PROCEDURE — 86762 RUBELLA ANTIBODY: CPT

## 2019-05-02 PROCEDURE — 86765 RUBEOLA ANTIBODY: CPT

## 2019-05-02 PROCEDURE — 86735 MUMPS ANTIBODY: CPT

## 2019-05-02 PROCEDURE — 80061 LIPID PANEL: CPT

## 2019-05-02 PROCEDURE — 83036 HEMOGLOBIN GLYCOSYLATED A1C: CPT

## 2019-05-02 PROCEDURE — 86803 HEPATITIS C AB TEST: CPT

## 2019-05-02 PROCEDURE — 36415 COLL VENOUS BLD VENIPUNCTURE: CPT

## 2019-05-06 LAB
MEV IGG SER QL: ABNORMAL
MUV IGG SER QL: NORMAL

## 2019-05-09 ENCOUNTER — CLINICAL SUPPORT (OUTPATIENT)
Dept: INTERNAL MEDICINE CLINIC | Facility: CLINIC | Age: 64
End: 2019-05-09
Payer: MEDICARE

## 2019-05-09 DIAGNOSIS — Z23 NEED FOR MMR VACCINE: Primary | ICD-10-CM

## 2019-05-09 PROCEDURE — 90471 IMMUNIZATION ADMIN: CPT

## 2019-05-09 PROCEDURE — 90707 MMR VACCINE SC: CPT

## 2019-05-24 DIAGNOSIS — I10 ESSENTIAL HYPERTENSION: ICD-10-CM

## 2019-05-24 RX ORDER — ATENOLOL 50 MG/1
TABLET ORAL
Qty: 180 TABLET | Refills: 1 | Status: SHIPPED | OUTPATIENT
Start: 2019-05-24 | End: 2019-10-04 | Stop reason: SDUPTHER

## 2019-08-19 DIAGNOSIS — E78.2 MIXED HYPERLIPIDEMIA: ICD-10-CM

## 2019-08-19 RX ORDER — SIMVASTATIN 40 MG
40 TABLET ORAL
Qty: 90 TABLET | Refills: 1 | Status: SHIPPED | OUTPATIENT
Start: 2019-08-19 | End: 2020-03-03

## 2019-08-19 NOTE — TELEPHONE ENCOUNTER
Patient needs a refill on simvastatin 40 mg tablet, 1 daily at bedtime  Needs a 90 day supply      [facility]  [phone]/XAVIZVT Number    Call back #[phone]

## 2019-09-23 ENCOUNTER — OFFICE VISIT (OUTPATIENT)
Dept: INTERNAL MEDICINE CLINIC | Facility: CLINIC | Age: 64
End: 2019-09-23
Payer: MEDICARE

## 2019-09-23 VITALS
OXYGEN SATURATION: 98 % | WEIGHT: 266 LBS | TEMPERATURE: 98.2 F | HEIGHT: 69 IN | DIASTOLIC BLOOD PRESSURE: 82 MMHG | HEART RATE: 71 BPM | BODY MASS INDEX: 39.4 KG/M2 | RESPIRATION RATE: 16 BRPM | SYSTOLIC BLOOD PRESSURE: 114 MMHG

## 2019-09-23 DIAGNOSIS — Z23 NEED FOR INFLUENZA VACCINATION: ICD-10-CM

## 2019-09-23 DIAGNOSIS — Z23 NEED FOR PNEUMOCOCCAL VACCINATION: ICD-10-CM

## 2019-09-23 DIAGNOSIS — J06.9 ACUTE URI: Primary | ICD-10-CM

## 2019-09-23 PROCEDURE — 90662 IIV NO PRSV INCREASED AG IM: CPT | Performed by: INTERNAL MEDICINE

## 2019-09-23 PROCEDURE — 90670 PCV13 VACCINE IM: CPT | Performed by: INTERNAL MEDICINE

## 2019-09-23 PROCEDURE — 99213 OFFICE O/P EST LOW 20 MIN: CPT | Performed by: INTERNAL MEDICINE

## 2019-09-23 PROCEDURE — G0008 ADMIN INFLUENZA VIRUS VAC: HCPCS | Performed by: INTERNAL MEDICINE

## 2019-09-23 PROCEDURE — G0009 ADMIN PNEUMOCOCCAL VACCINE: HCPCS | Performed by: INTERNAL MEDICINE

## 2019-09-23 NOTE — PROGRESS NOTES
Assessment/Plan:     Appears to be recovering from a viral cold  Continue over-the-counter remedies  The small lymphadenopathy appears reactive  We can recheck at her upcoming follow-up visit  Return for Next scheduled follow up  No problem-specific Assessment & Plan notes found for this encounter  Diagnoses and all orders for this visit:    Acute URI    Need for pneumococcal vaccination  -     PNEUMOCOCCAL CONJUGATE VACCINE 13-VALENT GREATER THAN 6 MONTHS    Need for influenza vaccination  -     influenza vaccine, [id number], high-dose, PF 0 5 mL (FLUZONE HIGH-DOSE)          Subjective:      Patient ID: Temo Hamilton is a 71 y o  female  Patient comes in today complaining of what started as lingering cold symptoms which are improving  However, she noticed some swelling under her left jaw  Slightly tender  But now not  It has improved somewhat  Not related to eating  She became concerned after researching on the Internet and wants to make sure she does not have an abnormal lymph node or lymphoma or similar  She feels fine otherwise        ALLERGIES:  No Known Allergies    CURRENT MEDICATIONS:    Current Outpatient Medications:     atenolol (TENORMIN) 50 mg tablet, Take 2 tablets (100 mg total) by mouth daily, Disp: 90 tablet, Rfl: 1    losartan-hydrochlorothiazide (HYZAAR) 100-25 MG per tablet, TAKE 1 TABLET DAILY, Disp: 90 tablet, Rfl: 1    prednisoLONE acetate (PRED FORTE) 1 % ophthalmic suspension, INSTILL ONE DROP IN LEFT EYE TWICE A DAY, Disp: , Rfl: 1    simvastatin (ZOCOR) 40 mg tablet, Take 1 tablet (40 mg total) by mouth daily at bedtime, Disp: 90 tablet, Rfl: 1    atenolol (TENORMIN) 50 mg tablet, TAKE 2 TABLETS DAILY (Patient not taking: Reported on 9/23/2019), Disp: 180 tablet, Rfl: 1    ACTIVE PROBLEM LIST:  Patient Active Problem List   Diagnosis    Arthritis    Benign essential hypertension    Detached retina, left    Mixed hyperlipidemia    Impaired fasting glucose    Varicose veins of lower extremity       PAST MEDICAL HISTORY:  Past Medical History:   Diagnosis Date    Cataract     07/16/2017    Hyperlipidemia     Hypertension        PAST SURGICAL HISTORY:  Past Surgical History:   Procedure Laterality Date    CARPAL TUNNEL RELEASE      CATARACT EXTRACTION Left     EYE SURGERY      OK COLONOSCOPY FLX DX W/COLLJ SPEC WHEN PFRMD N/A 8/29/2017    Procedure: COLONOSCOPY;  Surgeon: All Padgett MD;  Location: [facility];   Service: Gastroenterology    RETINAL DETACHMENT SURGERY Left        FAMILY HISTORY:  Family History   Problem Relation Age of Onset    Coronary artery disease Mother     Diabetes Mother     COPD Father     Coronary artery disease Brother     Valvular heart disease Brother     Cancer Daughter        SOCIAL HISTORY:  Social History     Socioeconomic History    Marital status:      Spouse name: Not on file    Number of children: 2    Years of education: Not on file    Highest education level: Not on file   Occupational History    Occupation:       Comment: retired    Social Needs    Financial resource strain: Not on file    Food insecurity:     Worry: Not on file     Inability: Not on file   "[employer]" needs:     Medical: Not on file     Non-medical: Not on file   Tobacco Use    Smoking status: Never Smoker    Smokeless tobacco: Never Used   Substance and Sexual Activity    Alcohol use: Yes     Comment: RARELY, glass of wine q 2 weeks     Drug use: No    Sexual activity: Yes     Partners: Male   Lifestyle    Physical activity:     Days per week: Not on file     Minutes per session: Not on file    Stress: Not on file   Relationships    Social connections:     Talks on phone: Not on file     Gets together: Not on file     Attends Jain service: Not on file     Active member of club or organization: Not on file     Attends meetings of clubs or organizations: Not on file     Relationship status: Not on file    Intimate partner violence:     Fear of current or ex partner: Not on file     Emotionally abused: Not on file     Physically abused: Not on file     Forced sexual activity: Not on file   Other Topics Concern    Not on file   Social History Narrative    Brushes teeth twice a day    Dental care    Engages in instrumental music     Exercise: Yard work     Exercises reg,        Review of Systems   Constitutional: Negative for fatigue and fever  HENT: Positive for congestion and sore throat  Objective:  Vitals:    09/23/19 1306   BP: 114/82   BP Location: Left arm   Patient Position: Sitting   Cuff Size: Standard   Pulse: 71   Resp: 16   Temp: 98 2 °F (36 8 °C)   TempSrc: Oral   SpO2: 98%   Weight: 121 kg (266 lb)   Height: 5' 9" (1 753 m)     Body mass index is 39 28 kg/m²  Physical Exam   Constitutional: She is oriented to person, place, and time  She appears well-developed and well-nourished  HENT:   Right Ear: External ear normal    Left Ear: External ear normal    Mouth/Throat: No oropharyngeal exudate  Eyes: Conjunctivae are normal    Cardiovascular: Normal rate and regular rhythm  Pulmonary/Chest: Effort normal and breath sounds normal  She has no wheezes  She has no rales  Lymphadenopathy:     She has no cervical adenopathy  Neurological: She is alert and oriented to person, place, and time  Skin: No rash noted  Nursing note and vitals reviewed  RESULTS:    No results found for this or any previous visit (from the past 1008 hour(s))  This note was created with voice recognition software  Phonic, grammatical and spelling errors may be present within the note as a result

## 2019-10-04 ENCOUNTER — OFFICE VISIT (OUTPATIENT)
Dept: INTERNAL MEDICINE CLINIC | Facility: CLINIC | Age: 64
End: 2019-10-04
Payer: MEDICARE

## 2019-10-04 VITALS
OXYGEN SATURATION: 97 % | HEART RATE: 65 BPM | WEIGHT: 264.8 LBS | SYSTOLIC BLOOD PRESSURE: 118 MMHG | BODY MASS INDEX: 39.22 KG/M2 | DIASTOLIC BLOOD PRESSURE: 76 MMHG | HEIGHT: 69 IN

## 2019-10-04 DIAGNOSIS — R73.01 IMPAIRED FASTING GLUCOSE: ICD-10-CM

## 2019-10-04 DIAGNOSIS — E78.2 MIXED HYPERLIPIDEMIA: ICD-10-CM

## 2019-10-04 DIAGNOSIS — I10 BENIGN ESSENTIAL HYPERTENSION: ICD-10-CM

## 2019-10-04 DIAGNOSIS — Z00.00 MEDICARE ANNUAL WELLNESS VISIT, INITIAL: Primary | ICD-10-CM

## 2019-10-04 PROCEDURE — 99214 OFFICE O/P EST MOD 30 MIN: CPT | Performed by: INTERNAL MEDICINE

## 2019-10-04 NOTE — PROGRESS NOTES
Assessment/Plan:     Chronic problems are stable  After her respiratory illness resolves, if she still feels the lymph node on the left side, she will call back  Quality Measures:       Return in about 6 months (around 4/4/2020)  No problem-specific Assessment & Plan notes found for this encounter  Diagnoses and all orders for this visit:    Medicare annual wellness visit, initial    Benign essential hypertension  -     CBC and differential; Future  -     Comprehensive metabolic panel; Future  -     Lipid panel; Future    Impaired fasting glucose  -     Hemoglobin A1C; Future    Mixed hyperlipidemia          Subjective:      Patient ID: Prerna Porras is a 71 y o  female  Patient comes in today for routine follow-up and Medicare Wellness  Her blood pressure is controlled  She is watching her diet for the sugar  Cholesterol is controlled but she is due for blood work  Her respiratory illnesses resolving she still feels the lymph node near her left jaw  Still a little tender at times  No bigger        ALLERGIES:  No Known Allergies    CURRENT MEDICATIONS:    Current Outpatient Medications:     atenolol (TENORMIN) 50 mg tablet, Take 2 tablets (100 mg total) by mouth daily, Disp: 90 tablet, Rfl: 1    losartan-hydrochlorothiazide (HYZAAR) 100-25 MG per tablet, TAKE 1 TABLET DAILY, Disp: 90 tablet, Rfl: 1    prednisoLONE acetate (PRED FORTE) 1 % ophthalmic suspension, INSTILL ONE DROP IN LEFT EYE TWICE A DAY, Disp: , Rfl: 1    simvastatin (ZOCOR) 40 mg tablet, Take 1 tablet (40 mg total) by mouth daily at bedtime, Disp: 90 tablet, Rfl: 1    ACTIVE PROBLEM LIST:  Patient Active Problem List   Diagnosis    Arthritis    Benign essential hypertension    Detached retina, left    Mixed hyperlipidemia    Impaired fasting glucose    Varicose veins of lower extremity       PAST MEDICAL HISTORY:  Past Medical History:   Diagnosis Date    Cataract     07/16/2017    Hyperlipidemia     Hypertension PAST SURGICAL HISTORY:  Past Surgical History:   Procedure Laterality Date    CARPAL TUNNEL RELEASE      CATARACT EXTRACTION Left     EYE SURGERY      [place] COLONOSCOPY FLX DX W/COLLJ SPEC WHEN PFRMD N/A 8/29/2017    Procedure: COLONOSCOPY;  Surgeon: Ian Mcelroy MD;  Location: [facility];   Service: Gastroenterology    RETINAL DETACHMENT SURGERY Left        FAMILY HISTORY:  Family History   Problem Relation Age of Onset    Coronary artery disease Mother     Diabetes Mother     COPD Father     Coronary artery disease Brother     Valvular heart disease Brother     Cancer Daughter        SOCIAL HISTORY:  Social History     Socioeconomic History    Marital status:      Spouse name: Not on file    Number of children: 2    Years of education: Not on file    Highest education level: Not on file   Occupational History    Occupation:       Comment: retired    Social Needs    Financial resource strain: Not on file    Food insecurity:     Worry: Not on file     Inability: Not on file   [facility] needs:     Medical: Not on file     Non-medical: Not on file   Tobacco Use    Smoking status: Never Smoker    Smokeless tobacco: Never Used   Substance and Sexual Activity    Alcohol use: Yes     Comment: RARELY, glass of wine q 2 weeks     Drug use: No    Sexual activity: Yes     Partners: Male   Lifestyle    Physical activity:     Days per week: Not on file     Minutes per session: Not on file    Stress: Not on file   Relationships    Social connections:     Talks on phone: Not on file     Gets together: Not on file     Attends Anabaptist service: Not on file     Active member of club or organization: Not on file     Attends meetings of clubs or organizations: Not on file     Relationship status: Not on file    Intimate partner violence:     Fear of current or ex partner: Not on file     Emotionally abused: Not on file     Physically abused: Not on file     Forced sexual activity: Not on file   Other Topics Concern    Not on file   Social History Narrative    Brushes teeth twice a day    Dental care    Engages in instrumental music     Exercise: Yard work     Exercises reg,        Review of Systems   Respiratory: Negative for shortness of breath  Cardiovascular: Negative for chest pain  Gastrointestinal: Negative for abdominal pain  Objective:  Vitals:    10/04/19 0802   BP: 118/76   BP Location: Left arm   Patient Position: Sitting   Cuff Size: Large   Pulse: 65   SpO2: 97%   Weight: 120 kg (264 lb 12 8 oz)   Height: 5' 9" (1 753 m)     Body mass index is 39 1 kg/m²  Physical Exam   Constitutional: She is oriented to person, place, and time  She appears well-developed and well-nourished  Cardiovascular: Normal rate, regular rhythm and normal heart sounds  Pulmonary/Chest: Effort normal and breath sounds normal    Abdominal: Soft  There is no tenderness  Neurological: She is alert and oriented to person, place, and time  Nursing note and vitals reviewed  RESULTS:    No results found for this or any previous visit (from the past 1008 hour(s))  This note was created with voice recognition software  Phonic, grammatical and spelling errors may be present within the note as a result

## 2019-10-04 NOTE — PATIENT INSTRUCTIONS
Medicare Preventive Visit Patient Instructions  Thank you for completing your Welcome to Medicare Visit or Medicare Annual Wellness Visit today  Your next wellness visit will be due in one year (10/4/2020)  The screening/preventive services that you may require over the next 5-10 years are detailed below  Some tests may not apply to you based off risk factors and/or age  Screening tests ordered at today's visit but not completed yet may show as past due  Also, please note that scanned in results may not display below  Preventive Screenings:  Service Recommendations Previous Testing/Comments   Colorectal Cancer Screening  * Colonoscopy    * Fecal Occult Blood Test (FOBT)/Fecal Immunochemical Test (FIT)  * Fecal DNA/Cologuard Test  * Flexible Sigmoidoscopy Age: 54-65 years old   Colonoscopy: every 10 years (may be performed more frequently if at higher risk)  OR  FOBT/FIT: every 1 year  OR  Cologuard: every 3 years  OR  Sigmoidoscopy: every 5 years  Screening may be recommended earlier than age 48 if at higher risk for colorectal cancer  Also, an individualized decision between you and your healthcare provider will decide whether screening between the ages of 74-80 would be appropriate  Colonoscopy: 08/29/2017  FOBT/FIT: Not on file  Cologuard: Not on file  Sigmoidoscopy: Not on file    Screening Current     Breast Cancer Screening Age: 36 years old  Frequency: every 1-2 years  Not required if history of left and right mastectomy Mammogram: 05/15/2019    Screening Current   Cervical Cancer Screening Between the ages of 21-29, pap smear recommended once every 3 years  Between the ages of 33-67, can perform pap smear with HPV co-testing every 5 years     Recommendations may differ for women with a history of total hysterectomy, cervical cancer, or abnormal pap smears in past  Pap Smear: 08/29/2016    Screening Not Indicated   Hepatitis C Screening Once for adults born between 1945 and 1965  More frequently in patients at high risk for Hepatitis C Hep C Antibody: 05/02/2019    Screening Current   Diabetes Screening 1-2 times per year if you're at risk for diabetes or have pre-diabetes Fasting glucose: 116 mg/dL   A1C: 6 8 %    Screening Current   Cholesterol Screening Once every 5 years if you don't have a lipid disorder  May order more often based on risk factors  Lipid panel: 05/02/2019    Screening Not Indicated  History Lipid Disorder     Other Preventive Screenings Covered by Medicare:  1  Abdominal Aortic Aneurysm (AAA) Screening: covered once if your at risk  You're considered to be at risk if you have a family history of AAA  2  Lung Cancer Screening: covers low dose CT scan once per year if you meet all of the following conditions: (1) Age 50-69; (2) No signs or symptoms of lung cancer; (3) Current smoker or have quit smoking within the last 15 years; (4) You have a tobacco smoking history of at least 30 pack years (packs per day multiplied by number of years you smoked); (5) You get a written order from a healthcare provider  3  Glaucoma Screening: covered annually if you're considered high risk: (1) You have diabetes OR (2) Family history of glaucoma OR (3)  aged 48 and older OR (3)  American aged 72 and older  3  Osteoporosis Screening: covered every 2 years if you meet one of the following conditions: (1) You're estrogen deficient and at risk for osteoporosis based off medical history and other findings; (2) Have a vertebral abnormality; (3) On glucocorticoid therapy for more than 3 months; (4) Have primary hyperparathyroidism; (5) On osteoporosis medications and need to assess response to drug therapy  · Last bone density test (DXA Scan): 08/17/2006  5  HIV Screening: covered annually if you're between the age of 12-76  Also covered annually if you are younger than 13 and older than 72 with risk factors for HIV infection   For pregnant patients, it is covered up to 3 times per pregnancy  Immunizations:  Immunization Recommendations   Influenza Vaccine Annual influenza vaccination during flu season is recommended for all persons aged >= 6 months who do not have contraindications   Pneumococcal Vaccine (Prevnar and Pneumovax)  * Prevnar = PCV13  * Pneumovax = PPSV23   Adults 25-60 years old: 1-3 doses may be recommended based on certain risk factors  Adults 72 years old: Prevnar (PCV13) vaccine recommended followed by Pneumovax (PPSV23) vaccine  If already received PPSV23 since turning 65, then PCV13 recommended at least one year after PPSV23 dose  Hepatitis B Vaccine 3 dose series if at intermediate or high risk (ex: diabetes, end stage renal disease, liver disease)   Tetanus (Td) Vaccine - COST NOT COVERED BY MEDICARE PART B Following completion of primary series, a booster dose should be given every 10 years to maintain immunity against tetanus  Td may also be given as tetanus wound prophylaxis  Tdap Vaccine - COST NOT COVERED BY MEDICARE PART B Recommended at least once for all adults  For pregnant patients, recommended with each pregnancy  Shingles Vaccine (Shingrix) - COST NOT COVERED BY MEDICARE PART B  2 shot series recommended in those aged 48 and above     Health Maintenance Due:      Topic Date Due    MAMMOGRAM  05/15/2020    CRC Screening: Colonoscopy  08/29/2022    Hepatitis C Screening  Completed     Immunizations Due:  There are no preventive care reminders to display for this patient  Advance Directives   What are advance directives? Advance directives are legal documents that state your wishes and plans for medical care  These plans are made ahead of time in case you lose your ability to make decisions for yourself  Advance directives can apply to any medical decision, such as the treatments you want, and if you want to donate organs  What are the types of advance directives?   There are many types of advance directives, and each state has rules about how to use them  You may choose a combination of any of the following:  · Living will: This is a written record of the treatment you want  You can also choose which treatments you do not want, which to limit, and which to stop at a certain time  This includes surgery, medicine, IV fluid, and tube feedings  · Durable power of  for healthcare [place] [facility]): This is a written record that states who you want to make healthcare choices for you when you are unable to make them for yourself  This person, called a proxy, is usually a family member or a friend  You may choose more than 1 proxy  · Do not resuscitate (DNR) order:  A DNR order is used in case your heart stops beating or you stop breathing  It is a request not to have certain forms of treatment, such as CPR  A DNR order may be included in other types of advance directives  · Medical directive: This covers the care that you want if you are in a coma, near death, or unable to make decisions for yourself  You can list the treatments you want for each condition  Treatment may include pain medicine, surgery, blood transfusions, dialysis, IV or tube feedings, and a ventilator (breathing machine)  · Values history: This document has questions about your views, beliefs, and how you feel and think about life  This information can help others choose the care that you would choose  Why are advance directives important? An advance directive helps you control your care  Although spoken wishes may be used, it is better to have your wishes written down  Spoken wishes can be misunderstood, or not followed  Treatments may be given even if you do not want them  An advance directive may make it easier for your family to make difficult choices about your care  Weight Management   Why it is important to manage your weight:  Being overweight increases your risk of health conditions such as heart disease, high blood pressure, type 2 diabetes, and certain types of cancer   It can also increase your risk for osteoarthritis, sleep apnea, and other respiratory problems  Aim for a slow, steady weight loss  Even a small amount of weight loss can lower your risk of health problems  How to lose weight safely:  A safe and healthy way to lose weight is to eat fewer calories and get regular exercise  You can lose up about 1 pound a week by decreasing the number of calories you eat by 500 calories each day  Healthy meal plan for weight management:  A healthy meal plan includes a variety of foods, contains fewer calories, and helps you stay healthy  A healthy meal plan includes the following:  · Eat whole-grain foods more often  A healthy meal plan should contain fiber  Fiber is the part of grains, fruits, and vegetables that is not broken down by your body  Whole-grain foods are healthy and provide extra fiber in your diet  Some examples of whole-grain foods are whole-wheat breads and pastas, oatmeal, brown rice, and bulgur  · Eat a variety of vegetables every day  Include dark, leafy greens such as spinach, kale, piero greens, and mustard greens  Eat yellow and orange vegetables such as carrots, sweet potatoes, and winter squash  · Eat a variety of fruits every day  Choose fresh or canned fruit (canned in its own juice or light syrup) instead of juice  Fruit juice has very little or no fiber  · Eat low-fat dairy foods  Drink fat-free (skim) milk or 1% milk  Eat fat-free yogurt and low-fat cottage cheese  Try low-fat cheeses such as mozzarella and other reduced-fat cheeses  · Choose meat and other protein foods that are low in fat  Choose beans or other legumes such as split peas or lentils  Choose fish, skinless poultry (chicken or turkey), or lean cuts of red meat (beef or pork)  Before you cook meat or poultry, cut off any visible fat  · Use less fat and oil  Try baking foods instead of frying them   Add less fat, such as margarine, sour cream, regular salad dressing and mayonnaise to foods  Eat fewer high-fat foods  Some examples of high-fat foods include french fries, doughnuts, ice cream, and cakes  · Eat fewer sweets  Limit foods and drinks that are high in sugar  This includes candy, cookies, regular soda, and sweetened drinks  Exercise:  Exercise at least 30 minutes per day on most days of the week  Some examples of exercise include walking, biking, dancing, and swimming  You can also fit in more physical activity by taking the stairs instead of the elevator or parking farther away from stores  Ask your healthcare provider about the best exercise plan for you  © Copyright [employer] 2018 Information is for End User's use only and may not be sold, redistributed or otherwise used for commercial purposes  All illustrations and images included in CareNotes® are the copyrighted property of [employer]  or [facility] Preventive Visit Patient Instructions  Thank you for completing your Welcome to Medicare Visit or Medicare Annual Wellness Visit today  Your next wellness visit will be due in one year (10/4/2020)  The screening/preventive services that you may require over the next 5-10 years are detailed below  Some tests may not apply to you based off risk factors and/or age  Screening tests ordered at today's visit but not completed yet may show as past due  Also, please note that scanned in results may not display below  Preventive Screenings:  Service Recommendations Previous Testing/Comments   Colorectal Cancer Screening  * Colonoscopy    * Fecal Occult Blood Test (FOBT)/Fecal Immunochemical Test (FIT)  * Fecal DNA/Cologuard Test  * Flexible Sigmoidoscopy Age: 54-65 years old   Colonoscopy: every 10 years (may be performed more frequently if at higher risk)  OR  FOBT/FIT: every 1 year  OR  Cologuard: every 3 years  OR  Sigmoidoscopy: every 5 years  Screening may be recommended earlier than age 48 if at higher risk for colorectal cancer   Also, an individualized decision between you and your healthcare provider will decide whether screening between the ages of 74-80 would be appropriate  Colonoscopy: 08/29/2017  FOBT/FIT: Not on file  Cologuard: Not on file  Sigmoidoscopy: Not on file    Screening Current     Breast Cancer Screening Age: 36 years old  Frequency: every 1-2 years  Not required if history of left and right mastectomy Mammogram: 05/15/2019    Screening Current   Cervical Cancer Screening Between the ages of 21-29, pap smear recommended once every 3 years  Between the ages of 33-67, can perform pap smear with HPV co-testing every 5 years  Recommendations may differ for women with a history of total hysterectomy, cervical cancer, or abnormal pap smears in past  Pap Smear: 08/29/2016    Screening Not Indicated   Hepatitis C Screening Once for adults born between 1945 and 1965  More frequently in patients at high risk for Hepatitis C Hep C Antibody: 05/02/2019    Screening Current   Diabetes Screening 1-2 times per year if you're at risk for diabetes or have pre-diabetes Fasting glucose: 116 mg/dL   A1C: 6 8 %    Screening Current   Cholesterol Screening Once every 5 years if you don't have a lipid disorder  May order more often based on risk factors  Lipid panel: 05/02/2019    Screening Not Indicated  History Lipid Disorder     Other Preventive Screenings Covered by Medicare:  6  Abdominal Aortic Aneurysm (AAA) Screening: covered once if your at risk  You're considered to be at risk if you have a family history of AAA  7  Lung Cancer Screening: covers low dose CT scan once per year if you meet all of the following conditions: (1) Age 50-69; (2) No signs or symptoms of lung cancer; (3) Current smoker or have quit smoking within the last 15 years; (4) You have a tobacco smoking history of at least 30 pack years (packs per day multiplied by number of years you smoked); (5) You get a written order from a healthcare provider    8  Glaucoma Screening: covered annually if you're considered high risk: (1) You have diabetes OR (2) Family history of glaucoma OR (3)  aged 48 and older OR (3)  American aged 72 and older  5  Osteoporosis Screening: covered every 2 years if you meet one of the following conditions: (1) You're estrogen deficient and at risk for osteoporosis based off medical history and other findings; (2) Have a vertebral abnormality; (3) On glucocorticoid therapy for more than 3 months; (4) Have primary hyperparathyroidism; (5) On osteoporosis medications and need to assess response to drug therapy  · Last bone density test (DXA Scan): 08/17/2006  10  HIV Screening: covered annually if you're between the age of 12-76  Also covered annually if you are younger than 13 and older than 72 with risk factors for HIV infection  For pregnant patients, it is covered up to 3 times per pregnancy  Immunizations:  Immunization Recommendations   Influenza Vaccine Annual influenza vaccination during flu season is recommended for all persons aged >= 6 months who do not have contraindications   Pneumococcal Vaccine (Prevnar and Pneumovax)  * Prevnar = PCV13  * Pneumovax = PPSV23   Adults 25-60 years old: 1-3 doses may be recommended based on certain risk factors  Adults 72 years old: Prevnar (PCV13) vaccine recommended followed by Pneumovax (PPSV23) vaccine  If already received PPSV23 since turning 65, then PCV13 recommended at least one year after PPSV23 dose  Hepatitis B Vaccine 3 dose series if at intermediate or high risk (ex: diabetes, end stage renal disease, liver disease)   Tetanus (Td) Vaccine - COST NOT COVERED BY MEDICARE PART B Following completion of primary series, a booster dose should be given every 10 years to maintain immunity against tetanus  Td may also be given as tetanus wound prophylaxis  Tdap Vaccine - COST NOT COVERED BY MEDICARE PART B Recommended at least once for all adults   For pregnant patients, recommended with each pregnancy  Shingles Vaccine (Shingrix) - COST NOT COVERED BY MEDICARE PART B  2 shot series recommended in those aged 48 and above     Health Maintenance Due:      Topic Date Due    MAMMOGRAM  05/15/2020    CRC Screening: Colonoscopy  08/29/2022    Hepatitis C Screening  Completed     Immunizations Due:  There are no preventive care reminders to display for this patient  Advance Directives   What are advance directives? Advance directives are legal documents that state your wishes and plans for medical care  These plans are made ahead of time in case you lose your ability to make decisions for yourself  Advance directives can apply to any medical decision, such as the treatments you want, and if you want to donate organs  What are the types of advance directives? There are many types of advance directives, and each state has rules about how to use them  You may choose a combination of any of the following:  · Living will: This is a written record of the treatment you want  You can also choose which treatments you do not want, which to limit, and which to stop at a certain time  This includes surgery, medicine, IV fluid, and tube feedings  · Durable power of  for healthcare [place] [facility]): This is a written record that states who you want to make healthcare choices for you when you are unable to make them for yourself  This person, called a proxy, is usually a family member or a friend  You may choose more than 1 proxy  · Do not resuscitate (DNR) order:  A DNR order is used in case your heart stops beating or you stop breathing  It is a request not to have certain forms of treatment, such as CPR  A DNR order may be included in other types of advance directives  · Medical directive: This covers the care that you want if you are in a coma, near death, or unable to make decisions for yourself  You can list the treatments you want for each condition   Treatment may include pain medicine, surgery, blood transfusions, dialysis, IV or tube feedings, and a ventilator (breathing machine)  · Values history: This document has questions about your views, beliefs, and how you feel and think about life  This information can help others choose the care that you would choose  Why are advance directives important? An advance directive helps you control your care  Although spoken wishes may be used, it is better to have your wishes written down  Spoken wishes can be misunderstood, or not followed  Treatments may be given even if you do not want them  An advance directive may make it easier for your family to make difficult choices about your care  Weight Management   Why it is important to manage your weight:  Being overweight increases your risk of health conditions such as heart disease, high blood pressure, type 2 diabetes, and certain types of cancer  It can also increase your risk for osteoarthritis, sleep apnea, and other respiratory problems  Aim for a slow, steady weight loss  Even a small amount of weight loss can lower your risk of health problems  How to lose weight safely:  A safe and healthy way to lose weight is to eat fewer calories and get regular exercise  You can lose up about 1 pound a week by decreasing the number of calories you eat by 500 calories each day  Healthy meal plan for weight management:  A healthy meal plan includes a variety of foods, contains fewer calories, and helps you stay healthy  A healthy meal plan includes the following:  · Eat whole-grain foods more often  A healthy meal plan should contain fiber  Fiber is the part of grains, fruits, and vegetables that is not broken down by your body  Whole-grain foods are healthy and provide extra fiber in your diet  Some examples of whole-grain foods are whole-wheat breads and pastas, oatmeal, brown rice, and bulgur  · Eat a variety of vegetables every day    Include dark, leafy greens such as spinach, kale, piero greens, and mustard greens  Eat yellow and orange vegetables such as carrots, sweet potatoes, and winter squash  · Eat a variety of fruits every day  Choose fresh or canned fruit (canned in its own juice or light syrup) instead of juice  Fruit juice has very little or no fiber  · Eat low-fat dairy foods  Drink fat-free (skim) milk or 1% milk  Eat fat-free yogurt and low-fat cottage cheese  Try low-fat cheeses such as mozzarella and other reduced-fat cheeses  · Choose meat and other protein foods that are low in fat  Choose beans or other legumes such as split peas or lentils  Choose fish, skinless poultry (chicken or turkey), or lean cuts of red meat (beef or pork)  Before you cook meat or poultry, cut off any visible fat  · Use less fat and oil  Try baking foods instead of frying them  Add less fat, such as margarine, sour cream, regular salad dressing and mayonnaise to foods  Eat fewer high-fat foods  Some examples of high-fat foods include french fries, doughnuts, ice cream, and cakes  · Eat fewer sweets  Limit foods and drinks that are high in sugar  This includes candy, cookies, regular soda, and sweetened drinks  Exercise:  Exercise at least 30 minutes per day on most days of the week  Some examples of exercise include walking, biking, dancing, and swimming  You can also fit in more physical activity by taking the stairs instead of the elevator or parking farther away from stores  Ask your healthcare provider about the best exercise plan for you  © Copyright [employer] 2018 Information is for End User's use only and may not be sold, redistributed or otherwise used for commercial purposes   All illustrations and images included in CareNotes® are the copyrighted property of [employer]  or [address] [place] [address] [employer]

## 2019-10-04 NOTE — PROGRESS NOTES
Assessment and Plan:     Problem List Items Addressed This Visit     None           Preventive health issues were discussed with patient, and age appropriate screening tests were ordered as noted in patient's After Visit Summary  Personalized health advice and appropriate referrals for health education or preventive services given if needed, as noted in patient's After Visit Summary  History of Present Illness:     Patient presents for Welcome to Medicare visit  Patient Care Team:  Christie Becker MD as PCP - Linda Munoz MD as Endoscopist     Review of Systems:     Review of Systems   Respiratory: Negative for shortness of breath  Cardiovascular: Negative for chest pain  Gastrointestinal: Negative for abdominal pain  Problem List:     Patient Active Problem List   Diagnosis    Arthritis    Benign essential hypertension    Detached retina, left    Mixed hyperlipidemia    Impaired fasting glucose    Varicose veins of lower extremity      Past Medical and Surgical History:     Past Medical History:   Diagnosis Date    Cataract     07/16/2017    Hyperlipidemia     Hypertension      Past Surgical History:   Procedure Laterality Date    CARPAL TUNNEL RELEASE      CATARACT EXTRACTION Left     EYE SURGERY      MI COLONOSCOPY FLX DX W/COLLJ SPEC WHEN PFRMD N/A 8/29/2017    Procedure: COLONOSCOPY;  Surgeon: Regine Pinedo MD;  Location: [facility];   Service: Gastroenterology    RETINAL DETACHMENT SURGERY Left       Family History:     Family History   Problem Relation Age of Onset    Coronary artery disease Mother     Diabetes Mother     COPD Father     Coronary artery disease Brother     Valvular heart disease Brother     Cancer Daughter       Social History:     Social History     Socioeconomic History    Marital status:      Spouse name: Not on file    Number of children: 2    Years of education: Not on file    Highest education level: Not on file Occupational History    Occupation:       Comment: retired    Social Needs    Financial resource strain: Not on file    Food insecurity:     Worry: Not on file     Inability: Not on file   [place] needs:     Medical: Not on file     Non-medical: Not on file   Tobacco Use    Smoking status: Never Smoker    Smokeless tobacco: Never Used   Substance and Sexual Activity    Alcohol use: Yes     Comment: RARELY, glass of wine q 2 weeks     Drug use: No    Sexual activity: Yes     Partners: Male   Lifestyle    Physical activity:     Days per week: Not on file     Minutes per session: Not on file    Stress: Not on file   Relationships    Social connections:     Talks on phone: Not on file     Gets together: Not on file     Attends Taoism service: Not on file     Active member of club or organization: Not on file     Attends meetings of clubs or organizations: Not on file     Relationship status: Not on file    Intimate partner violence:     Fear of current or ex partner: Not on file     Emotionally abused: Not on file     Physically abused: Not on file     Forced sexual activity: Not on file   Other Topics Concern    Not on file   Social History Narrative    Brushes teeth twice a day    Dental care    Engages in instrumental music     Exercise:   Yard work     Exercises reg,       Medications and Allergies:     Current Outpatient Medications   Medication Sig Dispense Refill    atenolol (TENORMIN) 50 mg tablet Take 2 tablets (100 mg total) by mouth daily 90 tablet 1    atenolol (TENORMIN) 50 mg tablet TAKE 2 TABLETS DAILY (Patient not taking: Reported on 9/23/2019) 180 tablet 1    losartan-hydrochlorothiazide (HYZAAR) 100-25 MG per tablet TAKE 1 TABLET DAILY 90 tablet 1    prednisoLONE acetate (PRED FORTE) 1 % ophthalmic suspension INSTILL ONE DROP IN LEFT EYE TWICE A DAY  1    simvastatin (ZOCOR) 40 mg tablet Take 1 tablet (40 mg total) by mouth daily at bedtime 90 tablet 1     No current facility-administered medications for this visit  No Known Allergies   Immunizations:     Immunization History   Administered Date(s) Administered    INFLUENZA 11/11/2010, 10/31/2011, 01/16/2014    Influenza Quadrivalent, 6-35 Months IM 10/30/2017    Influenza Split High Dose Preservative Free IM 12/06/2016    Influenza, high dose seasonal 0 5 mL 09/23/2019    Influenza, recombinant, quadrivalent,injectable, preservative free 09/20/2018    MMR 05/09/2019    Pneumococcal Conjugate 13-Valent 09/23/2019    Pneumococcal Polysaccharide PPV23 11/11/2010    Tdap 12/06/2016      Health Maintenance:         Topic Date Due    MAMMOGRAM  05/15/2020    CRC Screening: Colonoscopy  08/29/2022    Hepatitis C Screening  Completed     There are no preventive care reminders to display for this patient  Medicare Screening Tests and Risk Assessments:     Melany Jaimes is here for her Initial Wellness visit  Health Risk Assessment:   Patient rates overall health as very good  Patient feels that their physical health rating is same  Eyesight was rated as same  Hearing was rated as same  Patient feels that their emotional and mental health rating is same  Pain experienced in the last 7 days has been none  Patient states that she has experienced no weight loss or gain in last 6 months  Depression Screening:   PHQ-2 Score: 0      Fall Risk Screening: In the past year, patient has experienced: no history of falling in past year      Urinary Incontinence Screening:   Patient has not leaked urine accidently in the last six months  Home Safety:  Patient does not have trouble with stairs inside or outside of their home  Patient has working smoke alarms and has no working carbon monoxide detector  Home safety hazards include: none  Patient has  so reviewed need for CO detector    Nutrition:   Current diet is Regular and No Added Salt       Medications:   Patient is currently taking over-the-counter supplements  OTC medications include: see medication list  Patient is able to manage medications  Activities of Daily Living (ADLs)/Instrumental Activities of Daily Living (IADLs):   Walk and transfer into and out of bed and chair?: Yes  Dress and groom yourself?: Yes    Bathe or shower yourself?: Yes    Feed yourself? Yes  Do your laundry/housekeeping?: Yes  Manage your money, pay your bills and track your expenses?: Yes  Make your own meals?: Yes    Do your own shopping?: Yes    Previous Hospitalizations:   Any hospitalizations or ED visits within the last 12 months?: No      Advance Care Planning:   Living will: Yes    Durable POA for healthcare: Yes    Advanced directive: Yes      Cognitive Screening:   Provider or family/friend/caregiver concerned regarding cognition?: No    PREVENTIVE SCREENINGS      Cardiovascular Screening:    General: Screening Not Indicated and History Lipid Disorder      Diabetes Screening:     General: Screening Current      Colorectal Cancer Screening:     General: Screening Current      Breast Cancer Screening:     General: Screening Current      Cervical Cancer Screening:    General: Screening Not Indicated      Abdominal Aortic Aneurysm (AAA) Screening:        General: Screening Not Indicated      Lung Cancer Screening:     General: Screening Not Indicated      Hepatitis C Screening:    General: Screening Current    No exam data present     Physical Exam:     There were no vitals taken for this visit      Physical Exam    Darlyn Cowden, MD

## 2019-10-10 ENCOUNTER — TELEPHONE (OUTPATIENT)
Dept: INTERNAL MEDICINE CLINIC | Facility: CLINIC | Age: 64
End: 2019-10-10

## 2019-10-10 DIAGNOSIS — R59.0 LEFT CERVICAL LYMPHADENOPATHY: Primary | ICD-10-CM

## 2019-10-10 NOTE — TELEPHONE ENCOUNTER
Patient was seen last Friday 10/4/19 regarding the swelling/lump on the left side of her jaw  Patient stopped by and said that it is really starting to bother her and getting larger  She said that you told her to fup with possible US, MRI or CT  Patient would like to do something  Please advise    Corby Scott     Best contact for Patient

## 2019-10-11 ENCOUNTER — TRANSCRIBE ORDERS (OUTPATIENT)
Dept: LAB | Facility: CLINIC | Age: 64
End: 2019-10-11

## 2019-10-11 ENCOUNTER — APPOINTMENT (OUTPATIENT)
Dept: LAB | Facility: CLINIC | Age: 64
End: 2019-10-11
Payer: MEDICARE

## 2019-10-11 DIAGNOSIS — R59.0 LEFT CERVICAL LYMPHADENOPATHY: ICD-10-CM

## 2019-10-11 LAB
ANION GAP SERPL CALCULATED.3IONS-SCNC: 8 MMOL/L (ref 4–13)
BUN SERPL-MCNC: 18 MG/DL (ref 5–25)
CALCIUM SERPL-MCNC: 9.7 MG/DL (ref 8.3–10.1)
CHLORIDE SERPL-SCNC: 102 MMOL/L (ref 100–108)
CO2 SERPL-SCNC: 27 MMOL/L (ref 21–32)
CREAT SERPL-MCNC: 0.77 MG/DL (ref 0.6–1.3)
GFR SERPL CREATININE-BSD FRML MDRD: 81 ML/MIN/1.73SQ M
GLUCOSE SERPL-MCNC: 113 MG/DL (ref 65–140)
POTASSIUM SERPL-SCNC: 4.1 MMOL/L (ref 3.5–5.3)
SODIUM SERPL-SCNC: 137 MMOL/L (ref 136–145)

## 2019-10-11 PROCEDURE — 36415 COLL VENOUS BLD VENIPUNCTURE: CPT

## 2019-10-11 PROCEDURE — 80048 BASIC METABOLIC PNL TOTAL CA: CPT

## 2019-10-15 ENCOUNTER — HOSPITAL ENCOUNTER (OUTPATIENT)
Dept: CT IMAGING | Facility: CLINIC | Age: 64
Discharge: HOME | End: 2019-10-15
Payer: MEDICARE

## 2019-10-15 DIAGNOSIS — R59.0 LEFT CERVICAL LYMPHADENOPATHY: ICD-10-CM

## 2019-10-15 PROCEDURE — 70491 CT SOFT TISSUE NECK W/DYE: CPT

## 2019-10-15 RX ADMIN — IOHEXOL 85 ML: 350 INJECTION, SOLUTION INTRAVENOUS at 12:42

## 2019-10-15 NOTE — LETTER
[address] [place] [address]  [address] [address]      October 18, 2019    MRN: [id number]     Phone: [phone]     Dear Ms  Val Rao recently had a(n) Cat Scan performed on 10/15/2019 at  [address] [place] [address] that was requested by Hetal Vázquez MD  The study was reviewed by a radiologist, which is a physician who specializes in medical imaging  The radiologist issued a report describing his or her findings  In that report there was a finding that the radiologist felt warranted further discussion with your health care provider and that discussion would be beneficial to you  The results were sent to Hetal Vázquez MD on 10/17/2019  We recommend that you contact Hetal Vázquez MD at [phone] or set up an appointment to discuss the results of the imaging test  If you have already heard from Hetal Vázquez MD regarding the results of your study, you can disregard this letter  This letter is not meant to alarm you, but intended to encourage you to follow-up on your results with the provider that sent you for the imaging study  In addition, we have enclosed answers to frequently asked questions by other patients who have also received a letter to review results with their health care provider (see page two)  Thank you for choosing [facility] for your medical imaging needs  FREQUENTLY ASKED QUESTIONS    1  Why am I receiving this letter? On license of [facility] requires us to notify patients who have findings on imaging exams that may require more testing or follow-up with a health professional within the next 3 months  2  How serious is the finding on the imaging test?  This letter is sent to all patients who may need follow-up or more testing within the next 3 months    Receiving this letter does not necessarily mean you have a life-threatening imaging finding or disease  Recommendations in the radiologists imaging report are general in nature and it is up to your healthcare provider to say whether those recommendations make sense for your situation  You are strongly encouraged to talk to your health care provider about the results and ask whether additional steps need to be taken  3  Where can I get a copy of the final report for my recent radiology exam?  To get a full copy of the report you can access your records online at [email or web address] or please contact [address] [place] [address] Department at [phone] Monday through Friday between 8 am and 6 pm          4  What do I need to do now? Please contact your health care provider who requested the imaging study to discuss what further actions (if any) are needed  You may have already heard from (your ordering provider) in regard to this test in which case you can disregard this letter  NOTICE IN ACCORDANCE WITH THE [facility] PATIENT TEST RESULT INFORMATION ACT OF 2018    You are receiving this notice as a result of a determination by your diagnostic imaging service that further discussions of your test results are warranted and would be beneficial to you  The complete results of your test or tests have been or will be sent to the health care practitioner that ordered the test or tests  It is recommended that you contact your health care practitioner to discuss your results as soon as possible

## 2019-10-16 ENCOUNTER — TELEPHONE (OUTPATIENT)
Dept: INTERNAL MEDICINE CLINIC | Facility: CLINIC | Age: 64
End: 2019-10-16

## 2019-10-17 ENCOUNTER — TELEPHONE (OUTPATIENT)
Dept: INTERNAL MEDICINE CLINIC | Facility: CLINIC | Age: 64
End: 2019-10-17

## 2019-10-17 NOTE — TELEPHONE ENCOUNTER
Patient called back and left a message in reference to her test results  Please call back #[phone]

## 2019-10-21 PROBLEM — E04.1 THYROID NODULE: Status: ACTIVE | Noted: 2019-10-21

## 2019-10-21 PROBLEM — K11.8 PAROTID MASS: Status: ACTIVE | Noted: 2019-10-21

## 2019-10-21 PROCEDURE — 88305 TISSUE EXAM BY PATHOLOGIST: CPT | Performed by: PATHOLOGY

## 2019-10-21 PROCEDURE — 88173 CYTOPATH EVAL FNA REPORT: CPT | Performed by: PATHOLOGY

## 2019-10-21 PROCEDURE — 88172 CYTP DX EVAL FNA 1ST EA SITE: CPT | Performed by: PATHOLOGY

## 2019-10-23 PROBLEM — D49.0 PAROTID NEOPLASM: Status: ACTIVE | Noted: 2019-10-23

## 2019-10-25 ENCOUNTER — LAB (OUTPATIENT)
Dept: LAB | Facility: HOSPITAL | Age: 64
End: 2019-10-25
Attending: INTERNAL MEDICINE
Payer: MEDICARE

## 2019-10-25 ENCOUNTER — OFFICE VISIT (OUTPATIENT)
Dept: LAB | Facility: HOSPITAL | Age: 64
End: 2019-10-25
Attending: SPECIALIST
Payer: MEDICARE

## 2019-10-25 DIAGNOSIS — D49.0 PAROTID NEOPLASM: ICD-10-CM

## 2019-10-25 DIAGNOSIS — I10 BENIGN ESSENTIAL HYPERTENSION: ICD-10-CM

## 2019-10-25 DIAGNOSIS — R73.01 IMPAIRED FASTING GLUCOSE: ICD-10-CM

## 2019-10-25 LAB
ALBUMIN SERPL BCP-MCNC: 3.9 G/DL (ref 3.5–5)
ALP SERPL-CCNC: 70 U/L (ref 46–116)
ALT SERPL W P-5'-P-CCNC: 26 U/L (ref 12–78)
ANION GAP SERPL CALCULATED.3IONS-SCNC: 7 MMOL/L (ref 4–13)
AST SERPL W P-5'-P-CCNC: 17 U/L (ref 5–45)
ATRIAL RATE: 58 BPM
BASOPHILS # BLD AUTO: 0.05 THOUSANDS/ΜL (ref 0–0.1)
BASOPHILS NFR BLD AUTO: 1 % (ref 0–1)
BILIRUB SERPL-MCNC: 1.4 MG/DL (ref 0.2–1)
BUN SERPL-MCNC: 12 MG/DL (ref 5–25)
CALCIUM SERPL-MCNC: 9.3 MG/DL (ref 8.3–10.1)
CHLORIDE SERPL-SCNC: 102 MMOL/L (ref 100–108)
CHOLEST SERPL-MCNC: 164 MG/DL (ref 50–200)
CO2 SERPL-SCNC: 32 MMOL/L (ref 21–32)
CREAT SERPL-MCNC: 0.6 MG/DL (ref 0.6–1.3)
EOSINOPHIL # BLD AUTO: 0.19 THOUSAND/ΜL (ref 0–0.61)
EOSINOPHIL NFR BLD AUTO: 3 % (ref 0–6)
ERYTHROCYTE [DISTWIDTH] IN BLOOD BY AUTOMATED COUNT: 12.9 % (ref 11.6–15.1)
EST. AVERAGE GLUCOSE BLD GHB EST-MCNC: 131 MG/DL
GFR SERPL CREATININE-BSD FRML MDRD: 96 ML/MIN/1.73SQ M
GLUCOSE P FAST SERPL-MCNC: 123 MG/DL (ref 65–99)
HBA1C MFR BLD: 6.2 % (ref 4.2–6.3)
HCT VFR BLD AUTO: 45.1 % (ref 34.8–46.1)
HDLC SERPL-MCNC: 38 MG/DL
HGB BLD-MCNC: 14.9 G/DL (ref 11.5–15.4)
IMM GRANULOCYTES # BLD AUTO: 0.02 THOUSAND/UL (ref 0–0.2)
IMM GRANULOCYTES NFR BLD AUTO: 0 % (ref 0–2)
LDLC SERPL CALC-MCNC: 66 MG/DL (ref 0–100)
LYMPHOCYTES # BLD AUTO: 2.34 THOUSANDS/ΜL (ref 0.6–4.47)
LYMPHOCYTES NFR BLD AUTO: 34 % (ref 14–44)
MCH RBC QN AUTO: 29.1 PG (ref 26.8–34.3)
MCHC RBC AUTO-ENTMCNC: 33 G/DL (ref 31.4–37.4)
MCV RBC AUTO: 88 FL (ref 82–98)
MONOCYTES # BLD AUTO: 0.55 THOUSAND/ΜL (ref 0.17–1.22)
MONOCYTES NFR BLD AUTO: 8 % (ref 4–12)
NEUTROPHILS # BLD AUTO: 3.75 THOUSANDS/ΜL (ref 1.85–7.62)
NEUTS SEG NFR BLD AUTO: 54 % (ref 43–75)
NONHDLC SERPL-MCNC: 126 MG/DL
NRBC BLD AUTO-RTO: 0 /100 WBCS
P AXIS: -18 DEGREES
PLATELET # BLD AUTO: 209 THOUSANDS/UL (ref 149–390)
PMV BLD AUTO: 11.4 FL (ref 8.9–12.7)
POTASSIUM SERPL-SCNC: 4.3 MMOL/L (ref 3.5–5.3)
PR INTERVAL: 198 MS
PROT SERPL-MCNC: 7.3 G/DL (ref 6.4–8.2)
QRS AXIS: -28 DEGREES
QRSD INTERVAL: 92 MS
QT INTERVAL: 436 MS
QTC INTERVAL: 428 MS
RBC # BLD AUTO: 5.12 MILLION/UL (ref 3.81–5.12)
SODIUM SERPL-SCNC: 141 MMOL/L (ref 136–145)
T WAVE AXIS: -8 DEGREES
TRIGL SERPL-MCNC: 299 MG/DL
VENTRICULAR RATE: 58 BPM
WBC # BLD AUTO: 6.9 THOUSAND/UL (ref 4.31–10.16)

## 2019-10-25 PROCEDURE — 80053 COMPREHEN METABOLIC PANEL: CPT

## 2019-10-25 PROCEDURE — 85025 COMPLETE CBC W/AUTO DIFF WBC: CPT

## 2019-10-25 PROCEDURE — 93005 ELECTROCARDIOGRAM TRACING: CPT

## 2019-10-25 PROCEDURE — 80061 LIPID PANEL: CPT

## 2019-10-25 PROCEDURE — 93010 ELECTROCARDIOGRAM REPORT: CPT | Performed by: INTERNAL MEDICINE

## 2019-10-25 PROCEDURE — 83036 HEMOGLOBIN GLYCOSYLATED A1C: CPT

## 2019-10-25 PROCEDURE — 36415 COLL VENOUS BLD VENIPUNCTURE: CPT

## 2019-12-14 ENCOUNTER — TELEPHONE (OUTPATIENT)
Dept: OTHER | Facility: OTHER | Age: 64
End: 2019-12-14

## 2019-12-29 DIAGNOSIS — I10 ESSENTIAL HYPERTENSION: ICD-10-CM

## 2019-12-29 DIAGNOSIS — I10 BENIGN ESSENTIAL HYPERTENSION: ICD-10-CM

## 2019-12-30 RX ORDER — ATENOLOL 50 MG/1
100 TABLET ORAL
Qty: 180 TABLET | Refills: 1 | Status: SHIPPED | OUTPATIENT
Start: 2019-12-30 | End: 2020-03-03

## 2019-12-30 RX ORDER — LOSARTAN POTASSIUM AND HYDROCHLOROTHIAZIDE 25; 100 MG/1; MG/1
1 TABLET ORAL DAILY
Qty: 90 TABLET | Refills: 1 | Status: SHIPPED | OUTPATIENT
Start: 2019-12-30 | End: 2020-03-03

## 2020-03-02 DIAGNOSIS — I10 ESSENTIAL HYPERTENSION: ICD-10-CM

## 2020-03-02 DIAGNOSIS — I10 BENIGN ESSENTIAL HYPERTENSION: ICD-10-CM

## 2020-03-02 DIAGNOSIS — E78.2 MIXED HYPERLIPIDEMIA: ICD-10-CM

## 2020-03-03 RX ORDER — ATENOLOL 50 MG/1
TABLET ORAL
Qty: 180 TABLET | Refills: 1 | Status: SHIPPED | OUTPATIENT
Start: 2020-03-03 | End: 2020-06-01 | Stop reason: SDUPTHER

## 2020-03-03 RX ORDER — LOSARTAN POTASSIUM AND HYDROCHLOROTHIAZIDE 25; 100 MG/1; MG/1
1 TABLET ORAL DAILY
Qty: 90 TABLET | Refills: 1 | Status: SHIPPED | OUTPATIENT
Start: 2020-03-03 | End: 2020-06-01 | Stop reason: SDUPTHER

## 2020-03-03 RX ORDER — SIMVASTATIN 40 MG
TABLET ORAL
Qty: 90 TABLET | Refills: 1 | Status: SHIPPED | OUTPATIENT
Start: 2020-03-03 | End: 2020-06-01 | Stop reason: SDUPTHER

## 2020-03-19 ENCOUNTER — TELEPHONE (OUTPATIENT)
Dept: INTERNAL MEDICINE CLINIC | Facility: CLINIC | Age: 65
End: 2020-03-19

## 2020-03-19 NOTE — TELEPHONE ENCOUNTER
She could add Claritin or Allegra  Allergy season has started so that could be contributing to continued symptoms  If persists, should be seen

## 2020-03-19 NOTE — TELEPHONE ENCOUNTER
Patient called stating she watches her 2 month old grandchild  She has recently developed cold sx, post nasal drip, cough, she has tried numerous OTC medications such as flonase, mucinex DM, mucinex dm max and cold ez with no relief  She would like to know if there is anything else she could do? Patient denies, SOB, fever, body aches, congestion      [phone]

## 2020-06-01 ENCOUNTER — TELEMEDICINE (OUTPATIENT)
Dept: INTERNAL MEDICINE CLINIC | Facility: CLINIC | Age: 65
End: 2020-06-01
Payer: MEDICARE

## 2020-06-01 DIAGNOSIS — K11.8 PAROTID MASS: ICD-10-CM

## 2020-06-01 DIAGNOSIS — E78.2 MIXED HYPERLIPIDEMIA: ICD-10-CM

## 2020-06-01 DIAGNOSIS — I10 BENIGN ESSENTIAL HYPERTENSION: ICD-10-CM

## 2020-06-01 DIAGNOSIS — Z00.00 MEDICARE ANNUAL WELLNESS VISIT, SUBSEQUENT: Primary | ICD-10-CM

## 2020-06-01 PROCEDURE — 1123F ACP DISCUSS/DSCN MKR DOCD: CPT | Performed by: INTERNAL MEDICINE

## 2020-06-01 PROCEDURE — 99214 OFFICE O/P EST MOD 30 MIN: CPT | Performed by: INTERNAL MEDICINE

## 2020-06-01 PROCEDURE — 3078F DIAST BP <80 MM HG: CPT | Performed by: INTERNAL MEDICINE

## 2020-06-01 PROCEDURE — 1036F TOBACCO NON-USER: CPT | Performed by: INTERNAL MEDICINE

## 2020-06-01 PROCEDURE — 1170F FXNL STATUS ASSESSED: CPT | Performed by: INTERNAL MEDICINE

## 2020-06-01 PROCEDURE — 4040F PNEUMOC VAC/ADMIN/RCVD: CPT | Performed by: INTERNAL MEDICINE

## 2020-06-01 PROCEDURE — 1125F AMNT PAIN NOTED PAIN PRSNT: CPT | Performed by: INTERNAL MEDICINE

## 2020-06-01 PROCEDURE — 3074F SYST BP LT 130 MM HG: CPT | Performed by: INTERNAL MEDICINE

## 2020-06-01 PROCEDURE — G0439 PPPS, SUBSEQ VISIT: HCPCS | Performed by: INTERNAL MEDICINE

## 2020-06-01 PROCEDURE — 1160F RVW MEDS BY RX/DR IN RCRD: CPT | Performed by: INTERNAL MEDICINE

## 2020-06-01 RX ORDER — ATENOLOL 50 MG/1
100 TABLET ORAL
Qty: 180 TABLET | Refills: 3
Start: 2020-06-01 | End: 2021-06-07

## 2020-06-01 RX ORDER — SIMVASTATIN 40 MG
40 TABLET ORAL
Qty: 90 TABLET | Refills: 3 | Status: SHIPPED | OUTPATIENT
Start: 2020-06-01 | End: 2021-05-17

## 2020-06-01 RX ORDER — LOSARTAN POTASSIUM AND HYDROCHLOROTHIAZIDE 25; 100 MG/1; MG/1
1 TABLET ORAL DAILY
Qty: 90 TABLET | Refills: 3 | Status: SHIPPED | OUTPATIENT
Start: 2020-06-01 | End: 2021-06-07

## 2020-06-01 RX ORDER — ATENOLOL 50 MG/1
100 TABLET ORAL
Qty: 180 TABLET | Refills: 3 | Status: SHIPPED | OUTPATIENT
Start: 2020-06-01 | End: 2020-06-01

## 2020-09-02 ENCOUNTER — APPOINTMENT (OUTPATIENT)
Dept: LAB | Facility: HOSPITAL | Age: 65
End: 2020-09-02
Attending: INTERNAL MEDICINE
Payer: MEDICARE

## 2020-09-02 DIAGNOSIS — I10 BENIGN ESSENTIAL HYPERTENSION: ICD-10-CM

## 2020-09-02 LAB
ALBUMIN SERPL BCP-MCNC: 4 G/DL (ref 3.5–5)
ALP SERPL-CCNC: 67 U/L (ref 46–116)
ALT SERPL W P-5'-P-CCNC: 32 U/L (ref 12–78)
ANION GAP SERPL CALCULATED.3IONS-SCNC: 8 MMOL/L (ref 4–13)
AST SERPL W P-5'-P-CCNC: 22 U/L (ref 5–45)
BASOPHILS # BLD AUTO: 0.04 THOUSANDS/ΜL (ref 0–0.1)
BASOPHILS NFR BLD AUTO: 1 % (ref 0–1)
BILIRUB SERPL-MCNC: 1.8 MG/DL (ref 0.2–1)
BUN SERPL-MCNC: 17 MG/DL (ref 5–25)
CALCIUM SERPL-MCNC: 9 MG/DL (ref 8.3–10.1)
CHLORIDE SERPL-SCNC: 100 MMOL/L (ref 100–108)
CHOLEST SERPL-MCNC: 144 MG/DL (ref 50–200)
CO2 SERPL-SCNC: 30 MMOL/L (ref 21–32)
CREAT SERPL-MCNC: 0.66 MG/DL (ref 0.6–1.3)
EOSINOPHIL # BLD AUTO: 0.17 THOUSAND/ΜL (ref 0–0.61)
EOSINOPHIL NFR BLD AUTO: 3 % (ref 0–6)
ERYTHROCYTE [DISTWIDTH] IN BLOOD BY AUTOMATED COUNT: 13.3 % (ref 11.6–15.1)
GFR SERPL CREATININE-BSD FRML MDRD: 92 ML/MIN/1.73SQ M
GLUCOSE P FAST SERPL-MCNC: 148 MG/DL (ref 65–99)
HCT VFR BLD AUTO: 42.7 % (ref 34.8–46.1)
HDLC SERPL-MCNC: 35 MG/DL
HGB BLD-MCNC: 14.3 G/DL (ref 11.5–15.4)
IMM GRANULOCYTES # BLD AUTO: 0.04 THOUSAND/UL (ref 0–0.2)
IMM GRANULOCYTES NFR BLD AUTO: 1 % (ref 0–2)
LDLC SERPL CALC-MCNC: 57 MG/DL (ref 0–100)
LYMPHOCYTES # BLD AUTO: 2.54 THOUSANDS/ΜL (ref 0.6–4.47)
LYMPHOCYTES NFR BLD AUTO: 39 % (ref 14–44)
MCH RBC QN AUTO: 29.2 PG (ref 26.8–34.3)
MCHC RBC AUTO-ENTMCNC: 33.5 G/DL (ref 31.4–37.4)
MCV RBC AUTO: 87 FL (ref 82–98)
MONOCYTES # BLD AUTO: 0.46 THOUSAND/ΜL (ref 0.17–1.22)
MONOCYTES NFR BLD AUTO: 7 % (ref 4–12)
NEUTROPHILS # BLD AUTO: 3.31 THOUSANDS/ΜL (ref 1.85–7.62)
NEUTS SEG NFR BLD AUTO: 49 % (ref 43–75)
NONHDLC SERPL-MCNC: 109 MG/DL
NRBC BLD AUTO-RTO: 0 /100 WBCS
PLATELET # BLD AUTO: 182 THOUSANDS/UL (ref 149–390)
PMV BLD AUTO: 11.3 FL (ref 8.9–12.7)
POTASSIUM SERPL-SCNC: 3.6 MMOL/L (ref 3.5–5.3)
PROT SERPL-MCNC: 7.3 G/DL (ref 6.4–8.2)
RBC # BLD AUTO: 4.89 MILLION/UL (ref 3.81–5.12)
SODIUM SERPL-SCNC: 138 MMOL/L (ref 136–145)
TRIGL SERPL-MCNC: 261 MG/DL
WBC # BLD AUTO: 6.56 THOUSAND/UL (ref 4.31–10.16)

## 2020-09-02 PROCEDURE — 80053 COMPREHEN METABOLIC PANEL: CPT

## 2020-09-02 PROCEDURE — 80061 LIPID PANEL: CPT

## 2020-09-02 PROCEDURE — 36415 COLL VENOUS BLD VENIPUNCTURE: CPT

## 2020-09-02 PROCEDURE — 85025 COMPLETE CBC W/AUTO DIFF WBC: CPT

## 2020-10-08 ENCOUNTER — TELEPHONE (OUTPATIENT)
Dept: ADMINISTRATIVE | Facility: OTHER | Age: 65
End: 2020-10-08

## 2020-10-08 ENCOUNTER — OFFICE VISIT (OUTPATIENT)
Dept: INTERNAL MEDICINE CLINIC | Facility: CLINIC | Age: 65
End: 2020-10-08
Payer: MEDICARE

## 2020-10-08 VITALS
TEMPERATURE: 98 F | WEIGHT: 272 LBS | BODY MASS INDEX: 40.29 KG/M2 | OXYGEN SATURATION: 99 % | HEIGHT: 69 IN | HEART RATE: 73 BPM | SYSTOLIC BLOOD PRESSURE: 138 MMHG | DIASTOLIC BLOOD PRESSURE: 90 MMHG | RESPIRATION RATE: 16 BRPM

## 2020-10-08 DIAGNOSIS — Z23 NEED FOR PNEUMOCOCCAL VACCINATION: ICD-10-CM

## 2020-10-08 DIAGNOSIS — E78.2 MIXED HYPERLIPIDEMIA: ICD-10-CM

## 2020-10-08 DIAGNOSIS — Z23 NEED FOR INFLUENZA VACCINATION: ICD-10-CM

## 2020-10-08 DIAGNOSIS — R73.01 IMPAIRED FASTING GLUCOSE: ICD-10-CM

## 2020-10-08 DIAGNOSIS — I10 BENIGN ESSENTIAL HYPERTENSION: Primary | ICD-10-CM

## 2020-10-08 PROCEDURE — G0009 ADMIN PNEUMOCOCCAL VACCINE: HCPCS | Performed by: INTERNAL MEDICINE

## 2020-10-08 PROCEDURE — 90732 PPSV23 VACC 2 YRS+ SUBQ/IM: CPT | Performed by: INTERNAL MEDICINE

## 2020-10-08 PROCEDURE — G0008 ADMIN INFLUENZA VIRUS VAC: HCPCS | Performed by: INTERNAL MEDICINE

## 2020-10-08 PROCEDURE — 99214 OFFICE O/P EST MOD 30 MIN: CPT | Performed by: INTERNAL MEDICINE

## 2020-10-08 PROCEDURE — 90662 IIV NO PRSV INCREASED AG IM: CPT | Performed by: INTERNAL MEDICINE

## 2021-01-25 ENCOUNTER — VBI (OUTPATIENT)
Dept: ADMINISTRATIVE | Facility: OTHER | Age: 66
End: 2021-01-25

## 2021-02-03 ENCOUNTER — APPOINTMENT (OUTPATIENT)
Dept: LAB | Facility: CLINIC | Age: 66
End: 2021-02-03
Payer: MEDICARE

## 2021-02-03 DIAGNOSIS — R73.01 IMPAIRED FASTING GLUCOSE: ICD-10-CM

## 2021-02-03 DIAGNOSIS — I10 BENIGN ESSENTIAL HYPERTENSION: ICD-10-CM

## 2021-02-03 LAB
ALBUMIN SERPL BCP-MCNC: 4.2 G/DL (ref 3.5–5)
ALP SERPL-CCNC: 76 U/L (ref 46–116)
ALT SERPL W P-5'-P-CCNC: 38 U/L (ref 12–78)
ANION GAP SERPL CALCULATED.3IONS-SCNC: 6 MMOL/L (ref 4–13)
AST SERPL W P-5'-P-CCNC: 23 U/L (ref 5–45)
BASOPHILS # BLD AUTO: 0.04 THOUSANDS/ΜL (ref 0–0.1)
BASOPHILS NFR BLD AUTO: 1 % (ref 0–1)
BILIRUB SERPL-MCNC: 1.76 MG/DL (ref 0.2–1)
BUN SERPL-MCNC: 16 MG/DL (ref 5–25)
CALCIUM SERPL-MCNC: 9.2 MG/DL (ref 8.3–10.1)
CHLORIDE SERPL-SCNC: 104 MMOL/L (ref 100–108)
CHOLEST SERPL-MCNC: 147 MG/DL (ref 50–200)
CO2 SERPL-SCNC: 28 MMOL/L (ref 21–32)
CREAT SERPL-MCNC: 0.66 MG/DL (ref 0.6–1.3)
EOSINOPHIL # BLD AUTO: 0.11 THOUSAND/ΜL (ref 0–0.61)
EOSINOPHIL NFR BLD AUTO: 2 % (ref 0–6)
ERYTHROCYTE [DISTWIDTH] IN BLOOD BY AUTOMATED COUNT: 13.2 % (ref 11.6–15.1)
EST. AVERAGE GLUCOSE BLD GHB EST-MCNC: 154 MG/DL
GFR SERPL CREATININE-BSD FRML MDRD: 92 ML/MIN/1.73SQ M
GLUCOSE P FAST SERPL-MCNC: 161 MG/DL (ref 65–99)
HBA1C MFR BLD: 7 %
HCT VFR BLD AUTO: 44 % (ref 34.8–46.1)
HDLC SERPL-MCNC: 35 MG/DL
HGB BLD-MCNC: 14.8 G/DL (ref 11.5–15.4)
IMM GRANULOCYTES # BLD AUTO: 0.01 THOUSAND/UL (ref 0–0.2)
IMM GRANULOCYTES NFR BLD AUTO: 0 % (ref 0–2)
LDLC SERPL CALC-MCNC: 65 MG/DL (ref 0–100)
LYMPHOCYTES # BLD AUTO: 2.47 THOUSANDS/ΜL (ref 0.6–4.47)
LYMPHOCYTES NFR BLD AUTO: 39 % (ref 14–44)
MCH RBC QN AUTO: 29.4 PG (ref 26.8–34.3)
MCHC RBC AUTO-ENTMCNC: 33.6 G/DL (ref 31.4–37.4)
MCV RBC AUTO: 87 FL (ref 82–98)
MONOCYTES # BLD AUTO: 0.41 THOUSAND/ΜL (ref 0.17–1.22)
MONOCYTES NFR BLD AUTO: 7 % (ref 4–12)
NEUTROPHILS # BLD AUTO: 3.3 THOUSANDS/ΜL (ref 1.85–7.62)
NEUTS SEG NFR BLD AUTO: 51 % (ref 43–75)
NONHDLC SERPL-MCNC: 112 MG/DL
NRBC BLD AUTO-RTO: 0 /100 WBCS
PLATELET # BLD AUTO: 196 THOUSANDS/UL (ref 149–390)
PMV BLD AUTO: 11.3 FL (ref 8.9–12.7)
POTASSIUM SERPL-SCNC: 4 MMOL/L (ref 3.5–5.3)
PROT SERPL-MCNC: 7.1 G/DL (ref 6.4–8.2)
RBC # BLD AUTO: 5.04 MILLION/UL (ref 3.81–5.12)
SODIUM SERPL-SCNC: 138 MMOL/L (ref 136–145)
TRIGL SERPL-MCNC: 236 MG/DL
WBC # BLD AUTO: 6.34 THOUSAND/UL (ref 4.31–10.16)

## 2021-02-03 PROCEDURE — 80053 COMPREHEN METABOLIC PANEL: CPT

## 2021-02-03 PROCEDURE — 36415 COLL VENOUS BLD VENIPUNCTURE: CPT

## 2021-02-03 PROCEDURE — 83036 HEMOGLOBIN GLYCOSYLATED A1C: CPT

## 2021-02-03 PROCEDURE — 80061 LIPID PANEL: CPT

## 2021-02-03 PROCEDURE — 85025 COMPLETE CBC W/AUTO DIFF WBC: CPT

## 2021-02-23 ENCOUNTER — OFFICE VISIT (OUTPATIENT)
Dept: INTERNAL MEDICINE CLINIC | Facility: CLINIC | Age: 66
End: 2021-02-23
Payer: MEDICARE

## 2021-02-23 VITALS
BODY MASS INDEX: 40.73 KG/M2 | OXYGEN SATURATION: 94 % | TEMPERATURE: 98 F | HEIGHT: 69 IN | WEIGHT: 275 LBS | HEART RATE: 75 BPM | SYSTOLIC BLOOD PRESSURE: 136 MMHG | DIASTOLIC BLOOD PRESSURE: 78 MMHG

## 2021-02-23 DIAGNOSIS — I10 BENIGN ESSENTIAL HYPERTENSION: ICD-10-CM

## 2021-02-23 DIAGNOSIS — Z00.00 MEDICARE ANNUAL WELLNESS VISIT, SUBSEQUENT: Primary | ICD-10-CM

## 2021-02-23 DIAGNOSIS — E78.2 MIXED HYPERLIPIDEMIA: ICD-10-CM

## 2021-02-23 DIAGNOSIS — R73.01 IMPAIRED FASTING GLUCOSE: ICD-10-CM

## 2021-02-23 PROCEDURE — G0438 PPPS, INITIAL VISIT: HCPCS | Performed by: INTERNAL MEDICINE

## 2021-02-23 PROCEDURE — 1123F ACP DISCUSS/DSCN MKR DOCD: CPT | Performed by: INTERNAL MEDICINE

## 2021-02-23 PROCEDURE — 99214 OFFICE O/P EST MOD 30 MIN: CPT | Performed by: INTERNAL MEDICINE

## 2021-02-23 NOTE — PROGRESS NOTES
Assessment/Plan:       Chronic problems are stable except for the sugar  Long discussion about diet  Told her I will give her 3 months and then repeat labs  If not improving, would need to even consider medication and she would really be considered diabetic at that point  Quality Measures:       Return in about 3 months (around 5/23/2021)  No problem-specific Assessment & Plan notes found for this encounter  Diagnoses and all orders for this visit:    Medicare annual wellness visit, subsequent    Benign essential hypertension    Mixed hyperlipidemia  -     Lipid panel; Future    Impaired fasting glucose  -     Comprehensive metabolic panel; Future  -     CBC and differential; Future  -     Hemoglobin A1C; Future          Subjective:      Patient ID: Cristina Cardenas is a 76 y o  adult  Patient comes in today for routine follow-up and Medicare wellness  She has been pretty much staying at home other than watching her granddaughter  Her blood pressure has been controlled  Cholesterol is controlled but sugar went up  She has not been really exercising because of the weather and the pandemic  Probably could do better with her diet        ALLERGIES:  No Known Allergies    CURRENT MEDICATIONS:    Current Outpatient Medications:     atenolol (TENORMIN) 50 mg tablet, Take 2 tablets (100 mg total) by mouth daily at bedtime, Disp: 180 tablet, Rfl: 3    cholecalciferol (VITAMIN D3) 1,000 units tablet, Take 1,000 Units by mouth daily at bedtime, Disp: , Rfl:     losartan-hydrochlorothiazide (HYZAAR) 100-25 MG per tablet, Take 1 tablet by mouth daily, Disp: 90 tablet, Rfl: 3    multivitamin (THERAGRAN) TABS, Take 1 tablet by mouth daily, Disp: , Rfl:     prednisoLONE acetate (PRED FORTE) 1 % ophthalmic suspension, INSTILL ONE DROP IN LEFT EYE TWICE A DAY, Disp: , Rfl: 1    simvastatin (ZOCOR) 40 mg tablet, Take 1 tablet (40 mg total) by mouth daily at bedtime, Disp: 90 tablet, Rfl: 3    ACTIVE PROBLEM LIST:  Patient Active Problem List   Diagnosis    Arthritis    Benign essential hypertension    Detached retina, left    Mixed hyperlipidemia    Impaired fasting glucose    Varicose veins of lower extremity    Parotid mass    Thyroid nodule    Parotid neoplasm       PAST MEDICAL HISTORY:  Past Medical History:   Diagnosis Date    Cataract     07/16/2017    Hyperlipidemia     Hypertension        PAST SURGICAL HISTORY:  Past Surgical History:   Procedure Laterality Date    CARPAL TUNNEL RELEASE Left     CATARACT EXTRACTION Left     COLONOSCOPY      EYE SURGERY      OR COLONOSCOPY FLX DX W/COLLJ SPEC WHEN PFRMD N/A 8/29/2017    Procedure: COLONOSCOPY;  Surgeon: Hany Romeo MD;  Location: [facility];   Service: Gastroenterology    RETINAL DETACHMENT SURGERY Left        FAMILY HISTORY:  Family History   Problem Relation Age of Onset    Coronary artery disease Mother     Diabetes Mother     COPD Father     Coronary artery disease Brother     Valvular heart disease Brother     Cancer Daughter        SOCIAL HISTORY:  Social History     Socioeconomic History    Marital status:      Spouse name: Not on file    Number of children: 2    Years of education: Not on file    Highest education level: Not on file   Occupational History    Occupation:       Comment: retired    Social Needs    Financial resource strain: Not on file    Food insecurity     Worry: Not on file     Inability: Not on file   RooT needs     Medical: Not on file     Non-medical: Not on file   Tobacco Use    Smoking status: Never Smoker    Smokeless tobacco: Never Used   Substance and Sexual Activity    Alcohol use: Yes     Frequency: Monthly or less     Drinks per session: 1 or 2     Comment: RARELY,    Drug use: No    Sexual activity: Yes     Partners: Male   Lifestyle    Physical activity     Days per week: Not on file     Minutes per session: Not on file    Stress: Not on file   Relationships    Social connections     Talks on phone: Not on file     Gets together: Not on file     Attends Mu-ism service: Not on file     Active member of club or organization: Not on file     Attends meetings of clubs or organizations: Not on file     Relationship status: Not on file    Intimate partner violence     Fear of current or ex partner: Not on file     Emotionally abused: Not on file     Physically abused: Not on file     Forced sexual activity: Not on file   Other Topics Concern    Not on file   Social History Narrative    Brushes teeth twice a day    Dental care    Engages in instrumental music     Exercise: Yard work     Exercises reg,        Review of Systems   Respiratory: Negative for shortness of breath  Cardiovascular: Negative for chest pain  Gastrointestinal: Negative for abdominal pain  Objective:  Vitals:    02/23/21 1526   BP: 136/78   BP Location: Left arm   Patient Position: Sitting   Cuff Size: Large   Pulse: 75   Temp: 98 °F (36 7 °C)   TempSrc: Temporal   SpO2: 94%   Weight: 125 kg (275 lb)   Height: 5' 9" (1 753 m)     Body mass index is 40 61 kg/m²  Physical Exam  Vitals signs and nursing note reviewed  Constitutional:       Appearance: Clarice Sanchez is well-developed  Cardiovascular:      Rate and Rhythm: Normal rate and regular rhythm  Heart sounds: Normal heart sounds  Pulmonary:      Effort: Pulmonary effort is normal       Breath sounds: Normal breath sounds  Abdominal:      Palpations: Abdomen is soft  Tenderness: There is no abdominal tenderness  Neurological:      Mental Status: Clarice Sanchez is alert and oriented to person, place, and time             RESULTS:    Recent Results (from the past 1008 hour(s))   CBC and differential    Collection Time: 02/03/21  9:07 AM   Result Value Ref Range    WBC 6 34 4 31 - 10 16 Thousand/uL    RBC 5 04 3 81 - 5 12 Million/uL    Hemoglobin 14 8 11 5 - 15 4 g/dL    Hematocrit 44 0 34 8 - 46 1 %    MCV 87 82 - 98 fL    MCH 29 4 26 8 - 34 3 pg    MCHC 33 6 31 4 - 37 4 g/dL    RDW 13 2 11 6 - 15 1 %    MPV 11 3 8 9 - 12 7 fL    Platelets 494 433 - 135 Thousands/uL    nRBC 0 /100 WBCs    Neutrophils Relative 51 43 - 75 %    Immat GRANS % 0 0 - 2 %    Lymphocytes Relative 39 14 - 44 %    Monocytes Relative 7 4 - 12 %    Eosinophils Relative 2 0 - 6 %    Basophils Relative 1 0 - 1 %    Neutrophils Absolute 3 30 1 85 - 7 62 Thousands/µL    Immature Grans Absolute 0 01 0 00 - 0 20 Thousand/uL    Lymphocytes Absolute 2 47 0 60 - 4 47 Thousands/µL    Monocytes Absolute 0 41 0 17 - 1 22 Thousand/µL    Eosinophils Absolute 0 11 0 00 - 0 61 Thousand/µL    Basophils Absolute 0 04 0 00 - 0 10 Thousands/µL   Comprehensive metabolic panel    Collection Time: 02/03/21  9:07 AM   Result Value Ref Range    Sodium 138 136 - 145 mmol/L    Potassium 4 0 3 5 - 5 3 mmol/L    Chloride 104 100 - 108 mmol/L    CO2 28 21 - 32 mmol/L    ANION GAP 6 4 - 13 mmol/L    BUN 16 5 - 25 mg/dL    Creatinine 0 66 0 60 - 1 30 mg/dL    Glucose, Fasting 161 (H) 65 - 99 mg/dL    Calcium 9 2 8 3 - 10 1 mg/dL    AST 23 5 - 45 U/L    ALT 38 12 - 78 U/L    Alkaline Phosphatase 76 46 - 116 U/L    Total Protein 7 1 6 4 - 8 2 g/dL    Albumin 4 2 3 5 - 5 0 g/dL    Total Bilirubin 1 76 (H) 0 20 - 1 00 mg/dL    eGFR 92 ml/min/1 73sq m   Lipid panel    Collection Time: 02/03/21  9:07 AM   Result Value Ref Range    Cholesterol 147 50 - 200 mg/dL    Triglycerides 236 (H) <=150 mg/dL    HDL, Direct 35 (L) >=40 mg/dL    LDL Calculated 65 0 - 100 mg/dL    Non-HDL-Chol (CHOL-HDL) 112 mg/dl   Hemoglobin A1C    Collection Time: 02/03/21  9:07 AM   Result Value Ref Range    Hemoglobin A1C 7 0 (H) Normal 3 8-5 6%; PreDiabetic 5 7-6 4%; Diabetic >=6 5%; Glycemic control for adults with diabetes <7 0% %     mg/dl       This note was created with voice recognition software  Phonic, grammatical and spelling errors may be present within the note as a result

## 2021-02-23 NOTE — PATIENT INSTRUCTIONS
Medicare Preventive Visit Patient Instructions  Thank you for completing your Welcome to Medicare Visit or Medicare Annual Wellness Visit today  Your next wellness visit will be due in one year (2/23/2022)  The screening/preventive services that you may require over the next 5-10 years are detailed below  Some tests may not apply to you based off risk factors and/or age  Screening tests ordered at today's visit but not completed yet may show as past due  Also, please note that scanned in results may not display below  Preventive Screenings:  Service Recommendations Previous Testing/Comments   Colorectal Cancer Screening  * Colonoscopy    * Fecal Occult Blood Test (FOBT)/Fecal Immunochemical Test (FIT)  * Fecal DNA/Cologuard Test  * Flexible Sigmoidoscopy Age: 54-65 years old   Colonoscopy: every 10 years (may be performed more frequently if at higher risk)  OR  FOBT/FIT: every 1 year  OR  Cologuard: every 3 years  OR  Sigmoidoscopy: every 5 years  Screening may be recommended earlier than age 48 if at higher risk for colorectal cancer  Also, an individualized decision between you and your healthcare provider will decide whether screening between the ages of 74-80 would be appropriate  Colonoscopy: 08/29/2017  FOBT/FIT: Not on file  Cologuard: Not on file  Sigmoidoscopy: Not on file    Screening Current     Breast Cancer Screening Age: 36 years old  Frequency: every 1-2 years  Not required if history of left and right mastectomy Mammogram: 09/04/2020    Screening Current   Cervical Cancer Screening Between the ages of 21-29, pap smear recommended once every 3 years  Between the ages of 33-67, can perform pap smear with HPV co-testing every 5 years     Recommendations may differ for women with a history of total hysterectomy, cervical cancer, or abnormal pap smears in past  Pap Smear: 08/29/2016    Screening Not Indicated   Hepatitis C Screening Once for adults born between 1945 and 1965  More frequently in patients at high risk for Hepatitis C Hep C Antibody: 05/02/2019    Screening Current   Diabetes Screening 1-2 times per year if you're at risk for diabetes or have pre-diabetes Fasting glucose: 161 mg/dL   A1C: 7 0 %    Screening Current   Cholesterol Screening Once every 5 years if you don't have a lipid disorder  May order more often based on risk factors  Lipid panel: 02/03/2021    Screening Not Indicated  History Lipid Disorder     Other Preventive Screenings Covered by Medicare:  1  Abdominal Aortic Aneurysm (AAA) Screening: covered once if your at risk  You're considered to be at risk if you have a family history of AAA  2  Lung Cancer Screening: covers low dose CT scan once per year if you meet all of the following conditions: (1) Age 50-69; (2) No signs or symptoms of lung cancer; (3) Current smoker or have quit smoking within the last 15 years; (4) You have a tobacco smoking history of at least 30 pack years (packs per day multiplied by number of years you smoked); (5) You get a written order from a healthcare provider  3  Glaucoma Screening: covered annually if you're considered high risk: (1) You have diabetes OR (2) Family history of glaucoma OR (3)  aged 48 and older OR (3)  American aged 72 and older  3  Osteoporosis Screening: covered every 2 years if you meet one of the following conditions: (1) You're estrogen deficient and at risk for osteoporosis based off medical history and other findings; (2) Have a vertebral abnormality; (3) On glucocorticoid therapy for more than 3 months; (4) Have primary hyperparathyroidism; (5) On osteoporosis medications and need to assess response to drug therapy  · Last bone density test (DXA Scan): 07/25/2019   5  HIV Screening: covered annually if you're between the age of 15-65  Also covered annually if you are younger than 13 and older than 72 with risk factors for HIV infection   For pregnant patients, it is covered up to 3 times per pregnancy  Immunizations:  Immunization Recommendations   Influenza Vaccine Annual influenza vaccination during flu season is recommended for all persons aged >= 6 months who do not have contraindications   Pneumococcal Vaccine (Prevnar and Pneumovax)  * Prevnar = PCV13  * Pneumovax = PPSV23   Adults 25-60 years old: 1-3 doses may be recommended based on certain risk factors  Adults 72 years old: Prevnar (PCV13) vaccine recommended followed by Pneumovax (PPSV23) vaccine  If already received PPSV23 since turning 65, then PCV13 recommended at least one year after PPSV23 dose  Hepatitis B Vaccine 3 dose series if at intermediate or high risk (ex: diabetes, end stage renal disease, liver disease)   Tetanus (Td) Vaccine - COST NOT COVERED BY MEDICARE PART B Following completion of primary series, a booster dose should be given every 10 years to maintain immunity against tetanus  Td may also be given as tetanus wound prophylaxis  Tdap Vaccine - COST NOT COVERED BY MEDICARE PART B Recommended at least once for all adults  For pregnant patients, recommended with each pregnancy  Shingles Vaccine (Shingrix) - COST NOT COVERED BY MEDICARE PART B  2 shot series recommended in those aged 48 and above     Health Maintenance Due:      Topic Date Due    Cervical Cancer Screening  08/29/2019    MAMMOGRAM  09/04/2021    Colonoscopy Surveillance  08/29/2022    Colorectal Cancer Screening  08/29/2027    Hepatitis C Screening  Completed     Immunizations Due:  There are no preventive care reminders to display for this patient  Advance Directives   What are advance directives? Advance directives are legal documents that state your wishes and plans for medical care  These plans are made ahead of time in case you lose your ability to make decisions for yourself  Advance directives can apply to any medical decision, such as the treatments you want, and if you want to donate organs     What are the types of advance directives? There are many types of advance directives, and each state has rules about how to use them  You may choose a combination of any of the following:  · Living will: This is a written record of the treatment you want  You can also choose which treatments you do not want, which to limit, and which to stop at a certain time  This includes surgery, medicine, IV fluid, and tube feedings  · Durable power of  for healthcare [facility]): This is a written record that states who you want to make healthcare choices for you when you are unable to make them for yourself  This person, called a proxy, is usually a family member or a friend  You may choose more than 1 proxy  · Do not resuscitate (DNR) order:  A DNR order is used in case your heart stops beating or you stop breathing  It is a request not to have certain forms of treatment, such as CPR  A DNR order may be included in other types of advance directives  · Medical directive: This covers the care that you want if you are in a coma, near death, or unable to make decisions for yourself  You can list the treatments you want for each condition  Treatment may include pain medicine, surgery, blood transfusions, dialysis, IV or tube feedings, and a ventilator (breathing machine)  · Values history: This document has questions about your views, beliefs, and how you feel and think about life  This information can help others choose the care that you would choose  Why are advance directives important? An advance directive helps you control your care  Although spoken wishes may be used, it is better to have your wishes written down  Spoken wishes can be misunderstood, or not followed  Treatments may be given even if you do not want them  An advance directive may make it easier for your family to make difficult choices about your care     Weight Management   Why it is important to manage your weight:  Being overweight increases your risk of health conditions such as heart disease, high blood pressure, type 2 diabetes, and certain types of cancer  It can also increase your risk for osteoarthritis, sleep apnea, and other respiratory problems  Aim for a slow, steady weight loss  Even a small amount of weight loss can lower your risk of health problems  How to lose weight safely:  A safe and healthy way to lose weight is to eat fewer calories and get regular exercise  You can lose up about 1 pound a week by decreasing the number of calories you eat by 500 calories each day  Healthy meal plan for weight management:  A healthy meal plan includes a variety of foods, contains fewer calories, and helps you stay healthy  A healthy meal plan includes the following:  · Eat whole-grain foods more often  A healthy meal plan should contain fiber  Fiber is the part of grains, fruits, and vegetables that is not broken down by your body  Whole-grain foods are healthy and provide extra fiber in your diet  Some examples of whole-grain foods are whole-wheat breads and pastas, oatmeal, brown rice, and bulgur  · Eat a variety of vegetables every day  Include dark, leafy greens such as spinach, kale, piero greens, and mustard greens  Eat yellow and orange vegetables such as carrots, sweet potatoes, and winter squash  · Eat a variety of fruits every day  Choose fresh or canned fruit (canned in its own juice or light syrup) instead of juice  Fruit juice has very little or no fiber  · Eat low-fat dairy foods  Drink fat-free (skim) milk or 1% milk  Eat fat-free yogurt and low-fat cottage cheese  Try low-fat cheeses such as mozzarella and other reduced-fat cheeses  · Choose meat and other protein foods that are low in fat  Choose beans or other legumes such as split peas or lentils  Choose fish, skinless poultry (chicken or turkey), or lean cuts of red meat (beef or pork)  Before you cook meat or poultry, cut off any visible fat  · Use less fat and oil    Try baking foods instead of frying them  Add less fat, such as margarine, sour cream, regular salad dressing and mayonnaise to foods  Eat fewer high-fat foods  Some examples of high-fat foods include french fries, doughnuts, ice cream, and cakes  · Eat fewer sweets  Limit foods and drinks that are high in sugar  This includes candy, cookies, regular soda, and sweetened drinks  Exercise:  Exercise at least 30 minutes per day on most days of the week  Some examples of exercise include walking, biking, dancing, and swimming  You can also fit in more physical activity by taking the stairs instead of the elevator or parking farther away from stores  Ask your healthcare provider about the best exercise plan for you  © Copyright [employer] 2018 Information is for End User's use only and may not be sold, redistributed or otherwise used for commercial purposes  All illustrations and images included in CareNotes® are the copyrighted property of [employer]  or [facility] Preventive Visit Patient Instructions  Thank you for completing your Welcome to Medicare Visit or Medicare Annual Wellness Visit today  Your next wellness visit will be due in one year (2/23/2022)  The screening/preventive services that you may require over the next 5-10 years are detailed below  Some tests may not apply to you based off risk factors and/or age  Screening tests ordered at today's visit but not completed yet may show as past due  Also, please note that scanned in results may not display below    Preventive Screenings:  Service Recommendations Previous Testing/Comments   Colorectal Cancer Screening  * Colonoscopy    * Fecal Occult Blood Test (FOBT)/Fecal Immunochemical Test (FIT)  * Fecal DNA/Cologuard Test  * Flexible Sigmoidoscopy Age: 54-65 years old   Colonoscopy: every 10 years (may be performed more frequently if at higher risk)  OR  FOBT/FIT: every 1 year  OR  Cologuard: every 3 years  OR  Sigmoidoscopy: every 5 years  Screening may be recommended earlier than age 48 if at higher risk for colorectal cancer  Also, an individualized decision between you and your healthcare provider will decide whether screening between the ages of 74-80 would be appropriate  Colonoscopy: 08/29/2017  FOBT/FIT: Not on file  Cologuard: Not on file  Sigmoidoscopy: Not on file    Screening Current     Breast Cancer Screening Age: 36 years old  Frequency: every 1-2 years  Not required if history of left and right mastectomy Mammogram: 09/04/2020    Screening Current   Cervical Cancer Screening Between the ages of 21-29, pap smear recommended once every 3 years  Between the ages of 33-67, can perform pap smear with HPV co-testing every 5 years  Recommendations may differ for women with a history of total hysterectomy, cervical cancer, or abnormal pap smears in past  Pap Smear: 08/29/2016    Screening Not Indicated   Hepatitis C Screening Once for adults born between 1945 and 1965  More frequently in patients at high risk for Hepatitis C Hep C Antibody: 05/02/2019    Screening Current   Diabetes Screening 1-2 times per year if you're at risk for diabetes or have pre-diabetes Fasting glucose: 161 mg/dL   A1C: 7 0 %    Screening Current   Cholesterol Screening Once every 5 years if you don't have a lipid disorder  May order more often based on risk factors  Lipid panel: 02/03/2021    Screening Not Indicated  History Lipid Disorder     Other Preventive Screenings Covered by Medicare:  6  Abdominal Aortic Aneurysm (AAA) Screening: covered once if your at risk  You're considered to be at risk if you have a family history of AAA    7  Lung Cancer Screening: covers low dose CT scan once per year if you meet all of the following conditions: (1) Age 50-69; (2) No signs or symptoms of lung cancer; (3) Current smoker or have quit smoking within the last 15 years; (4) You have a tobacco smoking history of at least 30 pack years (packs per day multiplied by number of years you smoked); (5) You get a written order from a healthcare provider  8  Glaucoma Screening: covered annually if you're considered high risk: (1) You have diabetes OR (2) Family history of glaucoma OR (3)  aged 48 and older OR (3)  American aged 72 and older  5  Osteoporosis Screening: covered every 2 years if you meet one of the following conditions: (1) You're estrogen deficient and at risk for osteoporosis based off medical history and other findings; (2) Have a vertebral abnormality; (3) On glucocorticoid therapy for more than 3 months; (4) Have primary hyperparathyroidism; (5) On osteoporosis medications and need to assess response to drug therapy  · Last bone density test (DXA Scan): 07/25/2019   10  HIV Screening: covered annually if you're between the age of 15-65  Also covered annually if you are younger than 13 and older than 72 with risk factors for HIV infection  For pregnant patients, it is covered up to 3 times per pregnancy  Immunizations:  Immunization Recommendations   Influenza Vaccine Annual influenza vaccination during flu season is recommended for all persons aged >= 6 months who do not have contraindications   Pneumococcal Vaccine (Prevnar and Pneumovax)  * Prevnar = PCV13  * Pneumovax = PPSV23   Adults 25-60 years old: 1-3 doses may be recommended based on certain risk factors  Adults 72 years old: Prevnar (PCV13) vaccine recommended followed by Pneumovax (PPSV23) vaccine  If already received PPSV23 since turning 65, then PCV13 recommended at least one year after PPSV23 dose  Hepatitis B Vaccine 3 dose series if at intermediate or high risk (ex: diabetes, end stage renal disease, liver disease)   Tetanus (Td) Vaccine - COST NOT COVERED BY MEDICARE PART B Following completion of primary series, a booster dose should be given every 10 years to maintain immunity against tetanus  Td may also be given as tetanus wound prophylaxis     Tdap Vaccine - COST NOT COVERED BY MEDICARE PART B Recommended at least once for all adults  For pregnant patients, recommended with each pregnancy  Shingles Vaccine (Shingrix) - COST NOT COVERED BY MEDICARE PART B  2 shot series recommended in those aged 48 and above     Health Maintenance Due:      Topic Date Due    Cervical Cancer Screening  08/29/2019    MAMMOGRAM  09/04/2021    Colonoscopy Surveillance  08/29/2022    Colorectal Cancer Screening  08/29/2027    Hepatitis C Screening  Completed     Immunizations Due:  There are no preventive care reminders to display for this patient  Advance Directives   What are advance directives? Advance directives are legal documents that state your wishes and plans for medical care  These plans are made ahead of time in case you lose your ability to make decisions for yourself  Advance directives can apply to any medical decision, such as the treatments you want, and if you want to donate organs  What are the types of advance directives? There are many types of advance directives, and each state has rules about how to use them  You may choose a combination of any of the following:  · Living will: This is a written record of the treatment you want  You can also choose which treatments you do not want, which to limit, and which to stop at a certain time  This includes surgery, medicine, IV fluid, and tube feedings  · Durable power of  for healthcare [facility]): This is a written record that states who you want to make healthcare choices for you when you are unable to make them for yourself  This person, called a proxy, is usually a family member or a friend  You may choose more than 1 proxy  · Do not resuscitate (DNR) order:  A DNR order is used in case your heart stops beating or you stop breathing  It is a request not to have certain forms of treatment, such as CPR  A DNR order may be included in other types of advance directives  · Medical directive:   This covers the care that you want if you are in a coma, near death, or unable to make decisions for yourself  You can list the treatments you want for each condition  Treatment may include pain medicine, surgery, blood transfusions, dialysis, IV or tube feedings, and a ventilator (breathing machine)  · Values history: This document has questions about your views, beliefs, and how you feel and think about life  This information can help others choose the care that you would choose  Why are advance directives important? An advance directive helps you control your care  Although spoken wishes may be used, it is better to have your wishes written down  Spoken wishes can be misunderstood, or not followed  Treatments may be given even if you do not want them  An advance directive may make it easier for your family to make difficult choices about your care  Weight Management   Why it is important to manage your weight:  Being overweight increases your risk of health conditions such as heart disease, high blood pressure, type 2 diabetes, and certain types of cancer  It can also increase your risk for osteoarthritis, sleep apnea, and other respiratory problems  Aim for a slow, steady weight loss  Even a small amount of weight loss can lower your risk of health problems  How to lose weight safely:  A safe and healthy way to lose weight is to eat fewer calories and get regular exercise  You can lose up about 1 pound a week by decreasing the number of calories you eat by 500 calories each day  Healthy meal plan for weight management:  A healthy meal plan includes a variety of foods, contains fewer calories, and helps you stay healthy  A healthy meal plan includes the following:  · Eat whole-grain foods more often  A healthy meal plan should contain fiber  Fiber is the part of grains, fruits, and vegetables that is not broken down by your body  Whole-grain foods are healthy and provide extra fiber in your diet   Some examples of whole-grain foods are whole-wheat breads and pastas, oatmeal, brown rice, and bulgur  · Eat a variety of vegetables every day  Include dark, leafy greens such as spinach, kale, piero greens, and mustard greens  Eat yellow and orange vegetables such as carrots, sweet potatoes, and winter squash  · Eat a variety of fruits every day  Choose fresh or canned fruit (canned in its own juice or light syrup) instead of juice  Fruit juice has very little or no fiber  · Eat low-fat dairy foods  Drink fat-free (skim) milk or 1% milk  Eat fat-free yogurt and low-fat cottage cheese  Try low-fat cheeses such as mozzarella and other reduced-fat cheeses  · Choose meat and other protein foods that are low in fat  Choose beans or other legumes such as split peas or lentils  Choose fish, skinless poultry (chicken or turkey), or lean cuts of red meat (beef or pork)  Before you cook meat or poultry, cut off any visible fat  · Use less fat and oil  Try baking foods instead of frying them  Add less fat, such as margarine, sour cream, regular salad dressing and mayonnaise to foods  Eat fewer high-fat foods  Some examples of high-fat foods include french fries, doughnuts, ice cream, and cakes  · Eat fewer sweets  Limit foods and drinks that are high in sugar  This includes candy, cookies, regular soda, and sweetened drinks  Exercise:  Exercise at least 30 minutes per day on most days of the week  Some examples of exercise include walking, biking, dancing, and swimming  You can also fit in more physical activity by taking the stairs instead of the elevator or parking farther away from stores  Ask your healthcare provider about the best exercise plan for you  © Copyright [employer] 2018 Information is for End User's use only and may not be sold, redistributed or otherwise used for commercial purposes   All illustrations and images included in CareNotes® are the copyrighted property of [employer]  or [facility] Preventive Visit Patient Instructions  Thank you for completing your Welcome to Medicare Visit or Medicare Annual Wellness Visit today  Your next wellness visit will be due in one year (2/23/2022)  The screening/preventive services that you may require over the next 5-10 years are detailed below  Some tests may not apply to you based off risk factors and/or age  Screening tests ordered at today's visit but not completed yet may show as past due  Also, please note that scanned in results may not display below  Preventive Screenings:  Service Recommendations Previous Testing/Comments   Colorectal Cancer Screening  * Colonoscopy    * Fecal Occult Blood Test (FOBT)/Fecal Immunochemical Test (FIT)  * Fecal DNA/Cologuard Test  * Flexible Sigmoidoscopy Age: 54-65 years old   Colonoscopy: every 10 years (may be performed more frequently if at higher risk)  OR  FOBT/FIT: every 1 year  OR  Cologuard: every 3 years  OR  Sigmoidoscopy: every 5 years  Screening may be recommended earlier than age 48 if at higher risk for colorectal cancer  Also, an individualized decision between you and your healthcare provider will decide whether screening between the ages of 74-80 would be appropriate  Colonoscopy: 08/29/2017  FOBT/FIT: Not on file  Cologuard: Not on file  Sigmoidoscopy: Not on file    Screening Current     Breast Cancer Screening Age: 36 years old  Frequency: every 1-2 years  Not required if history of left and right mastectomy Mammogram: 09/04/2020    Screening Current   Cervical Cancer Screening Between the ages of 21-29, pap smear recommended once every 3 years  Between the ages of 33-67, can perform pap smear with HPV co-testing every 5 years     Recommendations may differ for women with a history of total hysterectomy, cervical cancer, or abnormal pap smears in past  Pap Smear: 08/29/2016    Screening Not Indicated   Hepatitis C Screening Once for adults born between 1945 and 1965  More frequently in patients at high risk for Hepatitis C Hep C Antibody: 05/02/2019    Screening Current   Diabetes Screening 1-2 times per year if you're at risk for diabetes or have pre-diabetes Fasting glucose: 161 mg/dL   A1C: 7 0 %    Screening Current   Cholesterol Screening Once every 5 years if you don't have a lipid disorder  May order more often based on risk factors  Lipid panel: 02/03/2021    Screening Not Indicated  History Lipid Disorder     Other Preventive Screenings Covered by Medicare:  11  Abdominal Aortic Aneurysm (AAA) Screening: covered once if your at risk  You're considered to be at risk if you have a family history of AAA  12  Lung Cancer Screening: covers low dose CT scan once per year if you meet all of the following conditions: (1) Age 50-69; (2) No signs or symptoms of lung cancer; (3) Current smoker or have quit smoking within the last 15 years; (4) You have a tobacco smoking history of at least 30 pack years (packs per day multiplied by number of years you smoked); (5) You get a written order from a healthcare provider  15  Glaucoma Screening: covered annually if you're considered high risk: (1) You have diabetes OR (2) Family history of glaucoma OR (3)  aged 48 and older OR (3)  American aged 72 and older  15  Osteoporosis Screening: covered every 2 years if you meet one of the following conditions: (1) You're estrogen deficient and at risk for osteoporosis based off medical history and other findings; (2) Have a vertebral abnormality; (3) On glucocorticoid therapy for more than 3 months; (4) Have primary hyperparathyroidism; (5) On osteoporosis medications and need to assess response to drug therapy  · Last bone density test (DXA Scan): 07/25/2019  15  HIV Screening: covered annually if you're between the age of 12-76  Also covered annually if you are younger than 13 and older than 72 with risk factors for HIV infection   For pregnant patients, it is covered up to 3 times per directives? There are many types of advance directives, and each state has rules about how to use them  You may choose a combination of any of the following:  · Living will: This is a written record of the treatment you want  You can also choose which treatments you do not want, which to limit, and which to stop at a certain time  This includes surgery, medicine, IV fluid, and tube feedings  · Durable power of  for healthcare [facility]): This is a written record that states who you want to make healthcare choices for you when you are unable to make them for yourself  This person, called a proxy, is usually a family member or a friend  You may choose more than 1 proxy  · Do not resuscitate (DNR) order:  A DNR order is used in case your heart stops beating or you stop breathing  It is a request not to have certain forms of treatment, such as CPR  A DNR order may be included in other types of advance directives  · Medical directive: This covers the care that you want if you are in a coma, near death, or unable to make decisions for yourself  You can list the treatments you want for each condition  Treatment may include pain medicine, surgery, blood transfusions, dialysis, IV or tube feedings, and a ventilator (breathing machine)  · Values history: This document has questions about your views, beliefs, and how you feel and think about life  This information can help others choose the care that you would choose  Why are advance directives important? An advance directive helps you control your care  Although spoken wishes may be used, it is better to have your wishes written down  Spoken wishes can be misunderstood, or not followed  Treatments may be given even if you do not want them  An advance directive may make it easier for your family to make difficult choices about your care     Weight Management   Why it is important to manage your weight:  Being overweight increases your risk of health conditions frying them  Add less fat, such as margarine, sour cream, regular salad dressing and mayonnaise to foods  Eat fewer high-fat foods  Some examples of high-fat foods include french fries, doughnuts, ice cream, and cakes  · Eat fewer sweets  Limit foods and drinks that are high in sugar  This includes candy, cookies, regular soda, and sweetened drinks  Exercise:  Exercise at least 30 minutes per day on most days of the week  Some examples of exercise include walking, biking, dancing, and swimming  You can also fit in more physical activity by taking the stairs instead of the elevator or parking farther away from stores  Ask your healthcare provider about the best exercise plan for you  © Copyright [employer] 2018 Information is for End User's use only and may not be sold, redistributed or otherwise used for commercial purposes  All illustrations and images included in CareNotes® are the copyrighted property of [employer]  or [facility] Preventive Visit Patient Instructions  Thank you for completing your Welcome to Medicare Visit or Medicare Annual Wellness Visit today  Your next wellness visit will be due in one year (2/23/2022)  The screening/preventive services that you may require over the next 5-10 years are detailed below  Some tests may not apply to you based off risk factors and/or age  Screening tests ordered at today's visit but not completed yet may show as past due  Also, please note that scanned in results may not display below    Preventive Screenings:  Service Recommendations Previous Testing/Comments   Colorectal Cancer Screening  * Colonoscopy    * Fecal Occult Blood Test (FOBT)/Fecal Immunochemical Test (FIT)  * Fecal DNA/Cologuard Test  * Flexible Sigmoidoscopy Age: 54-65 years old   Colonoscopy: every 10 years (may be performed more frequently if at higher risk)  OR  FOBT/FIT: every 1 year  OR  Cologuard: every 3 years  OR  Sigmoidoscopy: every 5 years  Screening may be recommended earlier than age 48 if at higher risk for colorectal cancer  Also, an individualized decision between you and your healthcare provider will decide whether screening between the ages of 74-80 would be appropriate  Colonoscopy: 08/29/2017  FOBT/FIT: Not on file  Cologuard: Not on file  Sigmoidoscopy: Not on file    Screening Current     Breast Cancer Screening Age: 36 years old  Frequency: every 1-2 years  Not required if history of left and right mastectomy Mammogram: 09/04/2020    Screening Current   Cervical Cancer Screening Between the ages of 21-29, pap smear recommended once every 3 years  Between the ages of 33-67, can perform pap smear with HPV co-testing every 5 years  Recommendations may differ for women with a history of total hysterectomy, cervical cancer, or abnormal pap smears in past  Pap Smear: 08/29/2016    Screening Not Indicated   Hepatitis C Screening Once for adults born between 1945 and 1965  More frequently in patients at high risk for Hepatitis C Hep C Antibody: 05/02/2019    Screening Current   Diabetes Screening 1-2 times per year if you're at risk for diabetes or have pre-diabetes Fasting glucose: 161 mg/dL   A1C: 7 0 %    Screening Current   Cholesterol Screening Once every 5 years if you don't have a lipid disorder  May order more often based on risk factors  Lipid panel: 02/03/2021    Screening Not Indicated  History Lipid Disorder     Other Preventive Screenings Covered by Medicare:  16  Abdominal Aortic Aneurysm (AAA) Screening: covered once if your at risk  You're considered to be at risk if you have a family history of AAA    17  Lung Cancer Screening: covers low dose CT scan once per year if you meet all of the following conditions: (1) Age 50-69; (2) No signs or symptoms of lung cancer; (3) Current smoker or have quit smoking within the last 15 years; (4) You have a tobacco smoking history of at least 30 pack years (packs per day multiplied by number of years you smoked); (5) You get a written order from a healthcare provider  25  Glaucoma Screening: covered annually if you're considered high risk: (1) You have diabetes OR (2) Family history of glaucoma OR (3)  aged 48 and older OR (3)  American aged 72 and older  23  Osteoporosis Screening: covered every 2 years if you meet one of the following conditions: (1) You're estrogen deficient and at risk for osteoporosis based off medical history and other findings; (2) Have a vertebral abnormality; (3) On glucocorticoid therapy for more than 3 months; (4) Have primary hyperparathyroidism; (5) On osteoporosis medications and need to assess response to drug therapy  · Last bone density test (DXA Scan): 07/25/2019   20  HIV Screening: covered annually if you're between the age of 15-65  Also covered annually if you are younger than 13 and older than 72 with risk factors for HIV infection  For pregnant patients, it is covered up to 3 times per pregnancy  Immunizations:  Immunization Recommendations   Influenza Vaccine Annual influenza vaccination during flu season is recommended for all persons aged >= 6 months who do not have contraindications   Pneumococcal Vaccine (Prevnar and Pneumovax)  * Prevnar = PCV13  * Pneumovax = PPSV23   Adults 25-60 years old: 1-3 doses may be recommended based on certain risk factors  Adults 72 years old: Prevnar (PCV13) vaccine recommended followed by Pneumovax (PPSV23) vaccine  If already received PPSV23 since turning 65, then PCV13 recommended at least one year after PPSV23 dose  Hepatitis B Vaccine 3 dose series if at intermediate or high risk (ex: diabetes, end stage renal disease, liver disease)   Tetanus (Td) Vaccine - COST NOT COVERED BY MEDICARE PART B Following completion of primary series, a booster dose should be given every 10 years to maintain immunity against tetanus  Td may also be given as tetanus wound prophylaxis     Tdap Vaccine - COST NOT COVERED BY MEDICARE PART B Recommended at least once for all adults  For pregnant patients, recommended with each pregnancy  Shingles Vaccine (Shingrix) - COST NOT COVERED BY MEDICARE PART B  2 shot series recommended in those aged 48 and above     Health Maintenance Due:      Topic Date Due    Cervical Cancer Screening  08/29/2019    MAMMOGRAM  09/04/2021    Colonoscopy Surveillance  08/29/2022    Colorectal Cancer Screening  08/29/2027    Hepatitis C Screening  Completed     Immunizations Due:  There are no preventive care reminders to display for this patient  Advance Directives   What are advance directives? Advance directives are legal documents that state your wishes and plans for medical care  These plans are made ahead of time in case you lose your ability to make decisions for yourself  Advance directives can apply to any medical decision, such as the treatments you want, and if you want to donate organs  What are the types of advance directives? There are many types of advance directives, and each state has rules about how to use them  You may choose a combination of any of the following:  · Living will: This is a written record of the treatment you want  You can also choose which treatments you do not want, which to limit, and which to stop at a certain time  This includes surgery, medicine, IV fluid, and tube feedings  · Durable power of  for healthcare [facility]): This is a written record that states who you want to make healthcare choices for you when you are unable to make them for yourself  This person, called a proxy, is usually a family member or a friend  You may choose more than 1 proxy  · Do not resuscitate (DNR) order:  A DNR order is used in case your heart stops beating or you stop breathing  It is a request not to have certain forms of treatment, such as CPR  A DNR order may be included in other types of advance directives  · Medical directive:   This covers the care that you want if you are in a coma, near death, or unable to make decisions for yourself  You can list the treatments you want for each condition  Treatment may include pain medicine, surgery, blood transfusions, dialysis, IV or tube feedings, and a ventilator (breathing machine)  · Values history: This document has questions about your views, beliefs, and how you feel and think about life  This information can help others choose the care that you would choose  Why are advance directives important? An advance directive helps you control your care  Although spoken wishes may be used, it is better to have your wishes written down  Spoken wishes can be misunderstood, or not followed  Treatments may be given even if you do not want them  An advance directive may make it easier for your family to make difficult choices about your care  Weight Management   Why it is important to manage your weight:  Being overweight increases your risk of health conditions such as heart disease, high blood pressure, type 2 diabetes, and certain types of cancer  It can also increase your risk for osteoarthritis, sleep apnea, and other respiratory problems  Aim for a slow, steady weight loss  Even a small amount of weight loss can lower your risk of health problems  How to lose weight safely:  A safe and healthy way to lose weight is to eat fewer calories and get regular exercise  You can lose up about 1 pound a week by decreasing the number of calories you eat by 500 calories each day  Healthy meal plan for weight management:  A healthy meal plan includes a variety of foods, contains fewer calories, and helps you stay healthy  A healthy meal plan includes the following:  · Eat whole-grain foods more often  A healthy meal plan should contain fiber  Fiber is the part of grains, fruits, and vegetables that is not broken down by your body  Whole-grain foods are healthy and provide extra fiber in your diet   Some examples of whole-grain foods are whole-wheat breads and pastas, oatmeal, brown rice, and bulgur  · Eat a variety of vegetables every day  Include dark, leafy greens such as spinach, kale, piero greens, and mustard greens  Eat yellow and orange vegetables such as carrots, sweet potatoes, and winter squash  · Eat a variety of fruits every day  Choose fresh or canned fruit (canned in its own juice or light syrup) instead of juice  Fruit juice has very little or no fiber  · Eat low-fat dairy foods  Drink fat-free (skim) milk or 1% milk  Eat fat-free yogurt and low-fat cottage cheese  Try low-fat cheeses such as mozzarella and other reduced-fat cheeses  · Choose meat and other protein foods that are low in fat  Choose beans or other legumes such as split peas or lentils  Choose fish, skinless poultry (chicken or turkey), or lean cuts of red meat (beef or pork)  Before you cook meat or poultry, cut off any visible fat  · Use less fat and oil  Try baking foods instead of frying them  Add less fat, such as margarine, sour cream, regular salad dressing and mayonnaise to foods  Eat fewer high-fat foods  Some examples of high-fat foods include french fries, doughnuts, ice cream, and cakes  · Eat fewer sweets  Limit foods and drinks that are high in sugar  This includes candy, cookies, regular soda, and sweetened drinks  Exercise:  Exercise at least 30 minutes per day on most days of the week  Some examples of exercise include walking, biking, dancing, and swimming  You can also fit in more physical activity by taking the stairs instead of the elevator or parking farther away from stores  Ask your healthcare provider about the best exercise plan for you  © Copyright [employer] 2018 Information is for End User's use only and may not be sold, redistributed or otherwise used for commercial purposes   All illustrations and images included in CareNotes® are the copyrighted property of [employer]  or [address] [place] [address] [employer]

## 2021-02-23 NOTE — PROGRESS NOTES
Assessment and Plan:     Problem List Items Addressed This Visit     None        BMI Counseling: Body mass index is 40 61 kg/m²  The BMI is above normal  Nutrition recommendations include encouraging healthy choices of fruits and vegetables  Preventive health issues were discussed with patient, and age appropriate screening tests were ordered as noted in patient's After Visit Summary  Personalized health advice and appropriate referrals for health education or preventive services given if needed, as noted in patient's After Visit Summary  History of Present Illness:     Patient presents for [facility] to Medicare visit  Patient Care Team:  Michele Asencio MD as PCP - Jerzy Sutton MD as Endoscopist     Review of Systems:     Review of Systems   Respiratory: Negative for shortness of breath  Cardiovascular: Negative for chest pain  Gastrointestinal: Negative for abdominal pain  Problem List:     Patient Active Problem List   Diagnosis    Arthritis    Benign essential hypertension    Detached retina, left    Mixed hyperlipidemia    Impaired fasting glucose    Varicose veins of lower extremity    Parotid mass    Thyroid nodule    Parotid neoplasm      Past Medical and Surgical History:     Past Medical History:   Diagnosis Date    Cataract     07/16/2017    Hyperlipidemia     Hypertension      Past Surgical History:   Procedure Laterality Date    CARPAL TUNNEL RELEASE Left     CATARACT EXTRACTION Left     COLONOSCOPY      EYE SURGERY      [place] COLONOSCOPY FLX DX W/COLLJ SPEC WHEN PFRMD N/A 8/29/2017    Procedure: COLONOSCOPY;  Surgeon: Paola Winston MD;  [facility];   Service: Gastroenterology    RETINAL DETACHMENT SURGERY Left       Family History:     Family History   Problem Relation Age of Onset    Coronary artery disease Mother     Diabetes Mother     COPD Father     Coronary artery disease Brother     Valvular heart disease Brother     Cancer Daughter       Social History:        Social History     Socioeconomic History    Marital status:      Spouse name: None    Number of children: 2    Years of education: None    Highest education level: None   Occupational History    Occupation:       Comment: retired    Social Needs    Financial resource strain: None    Food insecurity     Worry: None     Inability: None    Transportation needs     Medical: None     Non-medical: None   Tobacco Use    Smoking status: Never Smoker    Smokeless tobacco: Never Used   Substance and Sexual Activity    Alcohol use: Yes     Frequency: Monthly or less     Drinks per session: 1 or 2     Comment: RARELY,    Drug use: No    Sexual activity: Yes     Partners: Male   Lifestyle    Physical activity     Days per week: None     Minutes per session: None    Stress: None   Relationships    Social connections     Talks on phone: None     Gets together: None     Attends Zoroastrian service: None     Active member of club or organization: None     Attends meetings of clubs or organizations: None     Relationship status: None    Intimate partner violence     Fear of current or ex partner: None     Emotionally abused: None     Physically abused: None     Forced sexual activity: None   Other Topics Concern    None   Social History Narrative    Brushes teeth twice a day    Dental care    Engages in instrumental music     Exercise:   Yard work     Exercises reg,       Medications and Allergies:     Current Outpatient Medications   Medication Sig Dispense Refill    atenolol (TENORMIN) 50 mg tablet Take 2 tablets (100 mg total) by mouth daily at bedtime 180 tablet 3    cholecalciferol (VITAMIN D3) 1,000 units tablet Take 1,000 Units by mouth daily at bedtime      losartan-hydrochlorothiazide (HYZAAR) 100-25 MG per tablet Take 1 tablet by mouth daily 90 tablet 3    multivitamin (THERAGRAN) TABS Take 1 tablet by mouth daily      prednisoLONE acetate (PRED FORTE) 1 % ophthalmic suspension INSTILL ONE DROP IN LEFT EYE TWICE A DAY  1    simvastatin (ZOCOR) 40 mg tablet Take 1 tablet (40 mg total) by mouth daily at bedtime 90 tablet 3     No current facility-administered medications for this visit  No Known Allergies   Immunizations:     Immunization History   Administered Date(s) Administered    INFLUENZA 11/11/2010, 10/31/2011, 01/16/2014    Influenza Quadrivalent, 6-35 Months IM 10/30/2017    Influenza Split High Dose Preservative Free IM 12/06/2016    Influenza, high dose seasonal 0 7 mL 09/23/2019, 10/08/2020    Influenza, recombinant, quadrivalent,injectable, preservative free 09/20/2018    MMR 05/09/2019    Pneumococcal Conjugate 13-Valent 09/23/2019    Pneumococcal Polysaccharide PPV23 11/11/2010, 10/08/2020    Tdap 12/06/2016      Health Maintenance:         Topic Date Due    Cervical Cancer Screening  08/29/2019    MAMMOGRAM  09/04/2021    Colonoscopy Surveillance  08/29/2022    Colorectal Cancer Screening  08/29/2027    Hepatitis C Screening  Completed     There are no preventive care reminders to display for this patient  Medicare Screening Tests and Risk Assessments:     Enrique Christophern is here for [facility]'s Subsequent Wellness visit  Health Risk Assessment:   Patient rates overall health as good  Patient feels that their physical health rating is slightly worse  Eyesight was rated as same  Hearing was rated as same  Patient feels that their emotional and mental health rating is same  Pain experienced in the last 7 days has been none  Patient states that Taylor Osorio has experienced no weight loss or gain in last 6 months  Depression Screening:   PHQ-2 Score: 0      Fall Risk Screening: In the past year, patient has experienced: no history of falling in past year      Urinary Incontinence Screening:   Patient has not leaked urine accidently in the last six months       Home Safety:  Patient does not have trouble with stairs inside or outside of their home  Patient has working smoke alarms and has working carbon monoxide detector  Home safety hazards include: none  Nutrition:   Current diet is Regular and No Added Salt  Medications:   Patient is currently taking over-the-counter supplements  OTC medications include: see medication list  Patient is able to manage medications  Activities of Daily Living (ADLs)/Instrumental Activities of Daily Living (IADLs):   Walk and transfer into and out of bed and chair?: Yes  Dress and groom yourself?: Yes    Bathe or shower yourself?: Yes    Feed yourself? Yes  Do your laundry/housekeeping?: Yes  Manage your money, pay your bills and track your expenses?: Yes  Make your own meals?: Yes    Do your own shopping?: Yes    Previous Hospitalizations:   Any hospitalizations or ED visits within the last 12 months?: No      Advance Care Planning:   Living will: Yes    Durable POA for healthcare:  Yes    Advanced directive: Yes    Advanced directive counseling given: Yes      Cognitive Screening:   Provider or family/friend/caregiver concerned regarding cognition?: No    PREVENTIVE SCREENINGS      Cardiovascular Screening:    General: Screening Not Indicated and History Lipid Disorder      Diabetes Screening:     General: Screening Current      Colorectal Cancer Screening:     General: Screening Current      Breast Cancer Screening:     General: Screening Current      Cervical Cancer Screening:    General: Screening Not Indicated      Osteoporosis Screening:    General: Risks and Benefits Discussed      Abdominal Aortic Aneurysm (AAA) Screening:        General: Screening Not Indicated      Lung Cancer Screening:     General: Screening Not Indicated      Hepatitis C Screening:    General: Screening Current    No exam data present     Physical Exam:     Pulse 75   Temp 98 °F (36 7 °C) (Temporal)   Ht 5' 9" (1 753 m)   Wt 125 kg (275 lb)   SpO2 94%   BMI 40 61 kg/m²     Physical Exam  Vitals signs and nursing note reviewed  Neurological:      Mental Status: Trupti Counter is alert and oriented to person, place, and time  Psychiatric:         Behavior: Behavior normal          Thought Content:  Thought content normal          Judgment: Judgment normal           Yonatan Galan MD

## 2021-03-10 DIAGNOSIS — Z23 ENCOUNTER FOR IMMUNIZATION: ICD-10-CM

## 2021-05-16 DIAGNOSIS — E78.2 MIXED HYPERLIPIDEMIA: ICD-10-CM

## 2021-05-17 RX ORDER — SIMVASTATIN 40 MG
TABLET ORAL
Qty: 90 TABLET | Refills: 3 | Status: SHIPPED | OUTPATIENT
Start: 2021-05-17 | End: 2022-05-02

## 2021-05-18 ENCOUNTER — APPOINTMENT (OUTPATIENT)
Dept: LAB | Facility: CLINIC | Age: 66
End: 2021-05-18
Payer: MEDICARE

## 2021-05-18 DIAGNOSIS — R73.01 IMPAIRED FASTING GLUCOSE: ICD-10-CM

## 2021-05-18 DIAGNOSIS — E78.2 MIXED HYPERLIPIDEMIA: ICD-10-CM

## 2021-05-18 LAB
ALBUMIN SERPL BCP-MCNC: 4.1 G/DL (ref 3.5–5)
ALP SERPL-CCNC: 72 U/L (ref 46–116)
ALT SERPL W P-5'-P-CCNC: 32 U/L (ref 12–78)
ANION GAP SERPL CALCULATED.3IONS-SCNC: 5 MMOL/L (ref 4–13)
AST SERPL W P-5'-P-CCNC: 22 U/L (ref 5–45)
BASOPHILS # BLD AUTO: 0.07 THOUSANDS/ΜL (ref 0–0.1)
BASOPHILS NFR BLD AUTO: 1 % (ref 0–1)
BILIRUB SERPL-MCNC: 1.74 MG/DL (ref 0.2–1)
BUN SERPL-MCNC: 14 MG/DL (ref 5–25)
CALCIUM SERPL-MCNC: 9.5 MG/DL (ref 8.3–10.1)
CHLORIDE SERPL-SCNC: 100 MMOL/L (ref 100–108)
CHOLEST SERPL-MCNC: 154 MG/DL (ref 50–200)
CO2 SERPL-SCNC: 30 MMOL/L (ref 21–32)
CREAT SERPL-MCNC: 0.66 MG/DL (ref 0.6–1.3)
EOSINOPHIL # BLD AUTO: 0.2 THOUSAND/ΜL (ref 0–0.61)
EOSINOPHIL NFR BLD AUTO: 3 % (ref 0–6)
ERYTHROCYTE [DISTWIDTH] IN BLOOD BY AUTOMATED COUNT: 13.6 % (ref 11.6–15.1)
EST. AVERAGE GLUCOSE BLD GHB EST-MCNC: 157 MG/DL
GLUCOSE P FAST SERPL-MCNC: 194 MG/DL (ref 65–99)
HBA1C MFR BLD: 7.1 %
HCT VFR BLD AUTO: 43.4 % (ref 36.5–46.1)
HDLC SERPL-MCNC: 34 MG/DL
HGB BLD-MCNC: 14.8 G/DL (ref 12–15.4)
IMM GRANULOCYTES # BLD AUTO: 0.03 THOUSAND/UL (ref 0–0.2)
IMM GRANULOCYTES NFR BLD AUTO: 0 % (ref 0–2)
LDLC SERPL CALC-MCNC: 50 MG/DL (ref 0–100)
LYMPHOCYTES # BLD AUTO: 2.68 THOUSANDS/ΜL (ref 0.6–4.47)
LYMPHOCYTES NFR BLD AUTO: 35 % (ref 14–44)
MCH RBC QN AUTO: 30.4 PG (ref 26.8–34.3)
MCHC RBC AUTO-ENTMCNC: 34.1 G/DL (ref 31.4–37.4)
MCV RBC AUTO: 89 FL (ref 82–98)
MONOCYTES # BLD AUTO: 0.52 THOUSAND/ΜL (ref 0.17–1.22)
MONOCYTES NFR BLD AUTO: 7 % (ref 4–12)
NEUTROPHILS # BLD AUTO: 4.09 THOUSANDS/ΜL (ref 1.85–7.62)
NEUTS SEG NFR BLD AUTO: 54 % (ref 43–75)
NONHDLC SERPL-MCNC: 120 MG/DL
NRBC BLD AUTO-RTO: 0 /100 WBCS
PLATELET # BLD AUTO: 228 THOUSANDS/UL (ref 149–390)
PMV BLD AUTO: 11.8 FL (ref 8.9–12.7)
POTASSIUM SERPL-SCNC: 3.9 MMOL/L (ref 3.5–5.3)
PROT SERPL-MCNC: 7.2 G/DL (ref 6.4–8.2)
RBC # BLD AUTO: 4.87 MILLION/UL (ref 3.88–5.12)
SODIUM SERPL-SCNC: 135 MMOL/L (ref 136–145)
TRIGL SERPL-MCNC: 351 MG/DL
WBC # BLD AUTO: 7.59 THOUSAND/UL (ref 4.31–10.16)

## 2021-05-18 PROCEDURE — 36415 COLL VENOUS BLD VENIPUNCTURE: CPT

## 2021-05-18 PROCEDURE — 80061 LIPID PANEL: CPT

## 2021-05-18 PROCEDURE — 85025 COMPLETE CBC W/AUTO DIFF WBC: CPT

## 2021-05-18 PROCEDURE — 83036 HEMOGLOBIN GLYCOSYLATED A1C: CPT

## 2021-05-18 PROCEDURE — 80053 COMPREHEN METABOLIC PANEL: CPT

## 2021-05-25 ENCOUNTER — OFFICE VISIT (OUTPATIENT)
Dept: INTERNAL MEDICINE CLINIC | Facility: CLINIC | Age: 66
End: 2021-05-25
Payer: MEDICARE

## 2021-05-25 ENCOUNTER — TELEPHONE (OUTPATIENT)
Dept: ADMINISTRATIVE | Facility: OTHER | Age: 66
End: 2021-05-25

## 2021-05-25 VITALS
RESPIRATION RATE: 16 BRPM | SYSTOLIC BLOOD PRESSURE: 130 MMHG | OXYGEN SATURATION: 95 % | HEART RATE: 71 BPM | TEMPERATURE: 98.6 F | HEIGHT: 69 IN | DIASTOLIC BLOOD PRESSURE: 80 MMHG | WEIGHT: 270 LBS | BODY MASS INDEX: 39.99 KG/M2

## 2021-05-25 DIAGNOSIS — I10 BENIGN ESSENTIAL HYPERTENSION: Primary | ICD-10-CM

## 2021-05-25 DIAGNOSIS — D22.9 ATYPICAL MOLE: ICD-10-CM

## 2021-05-25 DIAGNOSIS — E78.2 MIXED HYPERLIPIDEMIA: ICD-10-CM

## 2021-05-25 DIAGNOSIS — M79.89 MASS OF SOFT TISSUE OF UPPER ARM: ICD-10-CM

## 2021-05-25 DIAGNOSIS — R73.01 IMPAIRED FASTING GLUCOSE: ICD-10-CM

## 2021-05-25 PROCEDURE — 99214 OFFICE O/P EST MOD 30 MIN: CPT | Performed by: INTERNAL MEDICINE

## 2021-05-25 NOTE — PROGRESS NOTES
Assessment/Plan:      long discussion regarding diet, plant based  She is going to make some further modifications  Added exercise so she can lower the sugar some more until the insulin resistance starts to decrease  Continue current medications  Will not make any adjustments yet  Repeat labs in 3 months  Quality Measures:       Return in about 3 months (around 8/25/2021)  No problem-specific Assessment & Plan notes found for this encounter  Diagnoses and all orders for this visit:    Benign essential hypertension  -     Comprehensive metabolic panel; Future  -     CBC and differential; Future    Mixed hyperlipidemia  -     Lipid panel; Future    Impaired fasting glucose  -     Hemoglobin A1C; Future    Mass of soft tissue of upper arm  -     Ambulatory referral to General Surgery; Future    Atypical mole  -     Ambulatory referral to Dermatology; Future          Subjective:      Patient ID: Lorena Gallardo is a 78 y o  adult  Patient comes in today for routine follow-up  She was a little disappointed in her labs  She has been working on a plant based diet but with the pandemic, as everyone else has, she got a little burned out  She has lost some weight  She has been unable to exercise until recently  Taking her medicines as directed  Sugars went up ever so slightly  Triglycerides were up  Not sure if she eats something the night before  Blood pressure is controlled  She would like to see a dermatologist and would like to have the probable lipoma removed from her arm  No other complaints today  No further additions to her history        ALLERGIES:  No Known Allergies    CURRENT MEDICATIONS:    Current Outpatient Medications:     atenolol (TENORMIN) 50 mg tablet, Take 2 tablets (100 mg total) by mouth daily at bedtime, Disp: 180 tablet, Rfl: 3    cholecalciferol (VITAMIN D3) 1,000 units tablet, Take 1,000 Units by mouth daily at bedtime, Disp: , Rfl:    losartan-hydrochlorothiazide (HYZAAR) 100-25 MG per tablet, Take 1 tablet by mouth daily, Disp: 90 tablet, Rfl: 3    multivitamin (THERAGRAN) TABS, Take 1 tablet by mouth daily, Disp: , Rfl:     prednisoLONE acetate (PRED FORTE) 1 % ophthalmic suspension, INSTILL ONE DROP IN LEFT EYE TWICE A DAY, Disp: , Rfl: 1    simvastatin (ZOCOR) 40 mg tablet, TAKE 1 TABLET BY MOUTH  DAILY AT BEDTIME, Disp: 90 tablet, Rfl: 3    ACTIVE PROBLEM LIST:  Patient Active Problem List   Diagnosis    Arthritis    Benign essential hypertension    Detached retina, left    Mixed hyperlipidemia    Impaired fasting glucose    Varicose veins of lower extremity    Parotid mass    Thyroid nodule    Parotid neoplasm       PAST MEDICAL HISTORY:  Past Medical History:   Diagnosis Date    Cataract     07/16/2017    Hyperlipidemia     Hypertension        PAST SURGICAL HISTORY:  Past Surgical History:   Procedure Laterality Date    CARPAL TUNNEL RELEASE Left     CATARACT EXTRACTION Left     COLONOSCOPY      EYE SURGERY      [place] COLONOSCOPY FLX DX W/COLLJ SPEC WHEN PFRMD N/A 8/29/2017    Procedure: COLONOSCOPY;  Surgeon: Samuel Angel MD;  Location: [facility];   Service: Gastroenterology    RETINAL DETACHMENT SURGERY Left        FAMILY HISTORY:  Family History   Problem Relation Age of Onset    Coronary artery disease Mother     Diabetes Mother     COPD Father     Coronary artery disease Brother     Valvular heart disease Brother     Cancer Daughter        SOCIAL HISTORY:  Social History     Socioeconomic History    Marital status:      Spouse name: Not on file    Number of children: 2    Years of education: Not on file    Highest education level: Not on file   Occupational History    Occupation:       Comment: retired    Social Needs    Financial resource strain: Not on file    Food insecurity     Worry: Not on file     Inability: Not on file   [employer] needs     Medical: Not on file Non-medical: Not on file   Tobacco Use    Smoking status: Never Smoker    Smokeless tobacco: Never Used   Substance and Sexual Activity    Alcohol use: Yes     Frequency: Monthly or less     Drinks per session: 1 or 2     Comment: RARELY,    Drug use: No    Sexual activity: Yes     Partners: Male   Lifestyle    Physical activity     Days per week: Not on file     Minutes per session: Not on file    Stress: Not on file   Relationships    Social connections     Talks on phone: Not on file     Gets together: Not on file     Attends Hoahaoism service: Not on file     Active member of club or organization: Not on file     Attends meetings of clubs or organizations: Not on file     Relationship status: Not on file    Intimate partner violence     Fear of current or ex partner: Not on file     Emotionally abused: Not on file     Physically abused: Not on file     Forced sexual activity: Not on file   Other Topics Concern    Not on file   Social History Narrative    Brushes teeth twice a day    Dental care    Engages in instrumental music     Exercise: Yard work     Exercises reg,        Review of Systems   Respiratory: Negative for shortness of breath  Cardiovascular: Negative for chest pain  Gastrointestinal: Negative for abdominal pain  Objective:  Vitals:    05/25/21 0910   BP: 130/80   Cuff Size: Large   Pulse: 71   Resp: 16   Temp: 98 6 °F (37 °C)   SpO2: 95%   Weight: 122 kg (270 lb)   Height: 5' 9" (1 753 m)     Body mass index is 39 87 kg/m²  Physical Exam  Vitals signs and nursing note reviewed  Constitutional:       Appearance: Yumiko Aquino is well-developed  Cardiovascular:      Rate and Rhythm: Normal rate and regular rhythm  Heart sounds: Normal heart sounds  Pulmonary:      Effort: Pulmonary effort is normal       Breath sounds: Normal breath sounds  Abdominal:      Palpations: Abdomen is soft  Tenderness: There is no abdominal tenderness     Neurological: Mental Status: Lula Hand is alert and oriented to person, place, and time  RESULTS:    Recent Results (from the past 1008 hour(s))   Comprehensive metabolic panel    Collection Time: 05/18/21  7:19 AM   Result Value Ref Range    Sodium 135 (L) 136 - 145 mmol/L    Potassium 3 9 3 5 - 5 3 mmol/L    Chloride 100 100 - 108 mmol/L    CO2 30 21 - 32 mmol/L    ANION GAP 5 4 - 13 mmol/L    BUN 14 5 - 25 mg/dL    Creatinine 0 66 0 60 - 1 30 mg/dL    Glucose, Fasting 194 (H) 65 - 99 mg/dL    Calcium 9 5 8 3 - 10 1 mg/dL    AST 22 5 - 45 U/L    ALT 32 12 - 78 U/L    Alkaline Phosphatase 72 46 - 116 U/L    Total Protein 7 2 6 4 - 8 2 g/dL    Albumin 4 1 3 5 - 5 0 g/dL    Total Bilirubin 1 74 (H) 0 20 - 1 00 mg/dL    eGFR     CBC and differential    Collection Time: 05/18/21  7:19 AM   Result Value Ref Range    WBC 7 59 4 31 - 10 16 Thousand/uL    RBC 4 87 3 88 - 5 12 Million/uL    Hemoglobin 14 8 12 0 - 15 4 g/dL    Hematocrit 43 4 36 5 - 46 1 %    MCV 89 82 - 98 fL    MCH 30 4 26 8 - 34 3 pg    MCHC 34 1 31 4 - 37 4 g/dL    RDW 13 6 11 6 - 15 1 %    MPV 11 8 8 9 - 12 7 fL    Platelets 542 819 - 988 Thousands/uL    nRBC 0 /100 WBCs    Neutrophils Relative 54 43 - 75 %    Immat GRANS % 0 0 - 2 %    Lymphocytes Relative 35 14 - 44 %    Monocytes Relative 7 4 - 12 %    Eosinophils Relative 3 0 - 6 %    Basophils Relative 1 0 - 1 %    Neutrophils Absolute 4 09 1 85 - 7 62 Thousands/µL    Immature Grans Absolute 0 03 0 00 - 0 20 Thousand/uL    Lymphocytes Absolute 2 68 0 60 - 4 47 Thousands/µL    Monocytes Absolute 0 52 0 17 - 1 22 Thousand/µL    Eosinophils Absolute 0 20 0 00 - 0 61 Thousand/µL    Basophils Absolute 0 07 0 00 - 0 10 Thousands/µL   Hemoglobin A1C    Collection Time: 05/18/21  7:19 AM   Result Value Ref Range    Hemoglobin A1C 7 1 (H) Normal 3 8-5 6%; PreDiabetic 5 7-6 4%;  Diabetic >=6 5%; Glycemic control for adults with diabetes <7 0% %     mg/dl   Lipid panel    Collection Time: 05/18/21  7:19 AM   Result Value Ref Range    Cholesterol 154 50 - 200 mg/dL    Triglycerides 351 (H) <=150 mg/dL    HDL, Direct 34 (L) >=40 mg/dL    LDL Calculated 50 0 - 100 mg/dL    Non-HDL-Chol (CHOL-HDL) 120 mg/dl       This note was created with voice recognition software  Phonic, grammatical and spelling errors may be present within the note as a result

## 2021-05-25 NOTE — TELEPHONE ENCOUNTER
----- Message from Alonso Villanueva sent at 5/25/2021  9:13 AM EDT -----  Regarding: Pap smear  05/25/21 9:14 AM    Hello, our patient Ruby Griggs has had Pap Smear (HPV) aka Cervical Cancer Screening completed/performed  Please assist in updating the patient chart by pulling the Care Everywhere (CE) document  The date of service is 7/2019       Thank you,  Alonso Villanueva  [facility]

## 2021-05-26 NOTE — TELEPHONE ENCOUNTER
Upon review of the In Basket request we were able to locate, review, and update the patient chart as requested for Pap Smear (HPV) aka Cervical Cancer Screening  Any additional questions or concerns should be emailed to the Practice Liaisons via [email or web address] email, please do not reply via In Basket      Thank you  Lisandra Colón MA

## 2021-06-06 DIAGNOSIS — I10 BENIGN ESSENTIAL HYPERTENSION: ICD-10-CM

## 2021-06-07 RX ORDER — LOSARTAN POTASSIUM AND HYDROCHLOROTHIAZIDE 25; 100 MG/1; MG/1
1 TABLET ORAL DAILY
Qty: 90 TABLET | Refills: 3 | Status: SHIPPED | OUTPATIENT
Start: 2021-06-07 | End: 2022-05-02

## 2021-06-07 RX ORDER — ATENOLOL 50 MG/1
TABLET ORAL
Qty: 180 TABLET | Refills: 3 | Status: SHIPPED | OUTPATIENT
Start: 2021-06-07 | End: 2022-05-02

## 2021-07-14 ENCOUNTER — TELEPHONE (OUTPATIENT)
Dept: INTERNAL MEDICINE CLINIC | Facility: CLINIC | Age: 66
End: 2021-07-14

## 2021-07-14 ENCOUNTER — OFFICE VISIT (OUTPATIENT)
Dept: INTERNAL MEDICINE CLINIC | Facility: CLINIC | Age: 66
End: 2021-07-14
Payer: MEDICARE

## 2021-07-14 VITALS
TEMPERATURE: 98 F | HEART RATE: 77 BPM | WEIGHT: 265 LBS | HEIGHT: 69 IN | SYSTOLIC BLOOD PRESSURE: 122 MMHG | DIASTOLIC BLOOD PRESSURE: 82 MMHG | OXYGEN SATURATION: 92 % | BODY MASS INDEX: 39.25 KG/M2

## 2021-07-14 DIAGNOSIS — M54.50 ACUTE LEFT-SIDED LOW BACK PAIN WITHOUT SCIATICA: Primary | ICD-10-CM

## 2021-07-14 LAB
SL AMB  POCT GLUCOSE, UA: NEGATIVE
SL AMB LEUKOCYTE ESTERASE,UA: 70
SL AMB POCT BILIRUBIN,UA: NEGATIVE
SL AMB POCT BLOOD,UA: NEGATIVE
SL AMB POCT CLARITY,UA: ABNORMAL
SL AMB POCT COLOR,UA: YELLOW
SL AMB POCT KETONES,UA: NEGATIVE
SL AMB POCT NITRITE,UA: NEGATIVE
SL AMB POCT PH,UA: 7
SL AMB POCT SPECIFIC GRAVITY,UA: 1.02
SL AMB POCT URINE PROTEIN: NEGATIVE
SL AMB POCT UROBILINOGEN: 16

## 2021-07-14 PROCEDURE — 81002 URINALYSIS NONAUTO W/O SCOPE: CPT | Performed by: INTERNAL MEDICINE

## 2021-07-14 PROCEDURE — 99213 OFFICE O/P EST LOW 20 MIN: CPT | Performed by: INTERNAL MEDICINE

## 2021-07-14 NOTE — TELEPHONE ENCOUNTER
Pt called about what medicine will she be prescribed? She had an appt earlier     Also      Pt says was Robles Turcios checking on past bill    ?

## 2021-07-14 NOTE — PROGRESS NOTES
Assessment/Plan:      Patient is here with c/o left lower back pain  Started about 2 weeks ago  Feels a twinge of pain that comes and goes  Thought it was a muscle strain as she has been straining herself as of recent  Looked online and worried about kidney stones  No associated s/s like n/v/d/c, dysuria, fever, chills  PE unremarkable  ABD is benign  No CV tenderness  Will hold off on any imaging at this time  Will [address] conservatively  Continue to monitor and if s/s worsen or do not improve she will call us  She cannot urinate  She will drink some water and try and give us a sample before leaving the office  No problem-specific Assessment & Plan notes found for this encounter  Diagnoses and all orders for this visit:    Acute left-sided low back pain without sciatica          Subjective:      Patient ID: Lisa Johnson is a 78 y o  adult  Here with concern for kidney stones  The following portions of the patient's history were reviewed and updated as appropriate:   Claus Angelo  has a past medical history of Cataract, Hyperlipidemia, and Hypertension  Evorn Pont   Patient Active Problem List    Diagnosis Date Noted    Parotid neoplasm 10/23/2019    Parotid mass 10/21/2019    Thyroid nodule 10/21/2019    Arthritis 06/16/2017    Benign essential hypertension 06/16/2017    Detached retina, left 06/16/2017    Mixed hyperlipidemia 06/16/2017    Impaired fasting glucose 06/16/2017    Varicose veins of lower extremity 06/16/2017     Claus Angelo  has a past surgical history that includes Retinal detachment surgery (Left); Eye surgery; pr colonoscopy flx dx w/collj spec when pfrmd (N/A, 8/29/2017); Colonoscopy; Carpal tunnel release (Left); and Cataract extraction (Left)    Shelley Odonnell's family history includes COPD in Shelley Odonnell's father; Cancer in Shelley Odonnell's daughter; Coronary artery disease in Shelley Odonnell's brother and mother; Diabetes in Shelley Odonnell's mother; Valvular heart disease in Mountainair Blas's brother  Kaiden Mendoza  reports that Kaiden Mendoza has never smoked  Kaiden Mendoza has never used smokeless tobacco  Kaiden Mendoza reports current alcohol use  Kaiden Mendoza reports that Kaiden Mendoza does not use drugs  Current Outpatient Medications   Medication Sig Dispense Refill    atenolol (TENORMIN) 50 mg tablet TAKE 2 TABLETS BY MOUTH AT  BEDTIME 180 tablet 3    cholecalciferol (VITAMIN D3) 1,000 units tablet Take 1,000 Units by mouth daily at bedtime      losartan-hydrochlorothiazide (HYZAAR) 100-25 MG per tablet TAKE 1 TABLET BY MOUTH  DAILY 90 tablet 3    multivitamin (THERAGRAN) TABS Take 1 tablet by mouth daily      prednisoLONE acetate (PRED FORTE) 1 % ophthalmic suspension INSTILL ONE DROP IN LEFT EYE TWICE A DAY  1    simvastatin (ZOCOR) 40 mg tablet TAKE 1 TABLET BY MOUTH  DAILY AT BEDTIME 90 tablet 3     No current facility-administered medications for this visit  Current Outpatient Medications on File Prior to Visit   Medication Sig    atenolol (TENORMIN) 50 mg tablet TAKE 2 TABLETS BY MOUTH AT  BEDTIME    cholecalciferol (VITAMIN D3) 1,000 units tablet Take 1,000 Units by mouth daily at bedtime    losartan-hydrochlorothiazide (HYZAAR) 100-25 MG per tablet TAKE 1 TABLET BY MOUTH  DAILY    multivitamin (THERAGRAN) TABS Take 1 tablet by mouth daily    prednisoLONE acetate (PRED FORTE) 1 % ophthalmic suspension INSTILL ONE DROP IN LEFT EYE TWICE A DAY    simvastatin (ZOCOR) 40 mg tablet TAKE 1 TABLET BY MOUTH  DAILY AT BEDTIME     No current facility-administered medications on file prior to visit  Kaiden Mendoza has No Known Allergies       Review of Systems   Constitutional: Negative for chills and fever  Genitourinary: Positive for flank pain  Negative for decreased urine volume, difficulty urinating, dysuria, frequency, hematuria and urgency  All other systems reviewed and are negative          Objective:      /82 (BP Location: Left arm, Patient Position: Sitting, Cuff Size: Adult)   Pulse 77   Temp 98 °F (36 7 °C) (Tympanic)   Ht 5' 9" (1 753 m)   Wt 120 kg (265 lb)   SpO2 92%   BMI 39 13 kg/m²          Physical Exam  Vitals and nursing note reviewed  Constitutional:       General: Perla Hwang is not in acute distress  Appearance: Normal appearance  Perla Hwang is not ill-appearing  Cardiovascular:      Rate and Rhythm: Normal rate and regular rhythm  Pulmonary:      Effort: Pulmonary effort is normal       Breath sounds: Normal breath sounds  Abdominal:      General: There is no distension  Palpations: Abdomen is soft  Tenderness: There is no abdominal tenderness  There is no right CVA tenderness, left CVA tenderness, guarding or rebound  Musculoskeletal:         General: Normal range of motion  Cervical back: Normal range of motion  Skin:     General: Skin is warm and dry  Neurological:      General: No focal deficit present  Mental Status: Perla Hwang is alert and oriented to person, place, and time  Psychiatric:         Mood and Affect: Mood normal          Behavior: Behavior normal          Thought Content:  Thought content normal          Judgment: Judgment normal

## 2021-07-15 NOTE — TELEPHONE ENCOUNTER
Called and informed pt that based on her symptoms she doesn't need an antibiotic  She was OK with this  Also informed her that once more information is provided in regards to her bill, we would be in touch with her  She thanked you for helping her with this

## 2021-07-15 NOTE — TELEPHONE ENCOUNTER
I spoke to patient and advised her that billing is working on a refund for her  She will let me know if she does not receive a refund check in the mail

## 2021-09-07 ENCOUNTER — APPOINTMENT (OUTPATIENT)
Dept: LAB | Facility: CLINIC | Age: 66
End: 2021-09-07
Payer: MEDICARE

## 2021-09-07 DIAGNOSIS — I10 BENIGN ESSENTIAL HYPERTENSION: ICD-10-CM

## 2021-09-07 DIAGNOSIS — R73.01 IMPAIRED FASTING GLUCOSE: ICD-10-CM

## 2021-09-07 DIAGNOSIS — E78.2 MIXED HYPERLIPIDEMIA: ICD-10-CM

## 2021-09-07 LAB
ALBUMIN SERPL BCP-MCNC: 4 G/DL (ref 3.5–5)
ALP SERPL-CCNC: 72 U/L (ref 46–116)
ALT SERPL W P-5'-P-CCNC: 30 U/L (ref 12–78)
ANION GAP SERPL CALCULATED.3IONS-SCNC: 6 MMOL/L (ref 4–13)
AST SERPL W P-5'-P-CCNC: 20 U/L (ref 5–45)
BASOPHILS # BLD AUTO: 0.04 THOUSANDS/ΜL (ref 0–0.1)
BASOPHILS NFR BLD AUTO: 1 % (ref 0–1)
BILIRUB SERPL-MCNC: 2.07 MG/DL (ref 0.2–1)
BUN SERPL-MCNC: 15 MG/DL (ref 5–25)
CALCIUM SERPL-MCNC: 9.6 MG/DL (ref 8.3–10.1)
CHLORIDE SERPL-SCNC: 102 MMOL/L (ref 100–108)
CHOLEST SERPL-MCNC: 136 MG/DL (ref 50–200)
CO2 SERPL-SCNC: 29 MMOL/L (ref 21–32)
CREAT SERPL-MCNC: 0.57 MG/DL (ref 0.6–1.3)
EOSINOPHIL # BLD AUTO: 0.13 THOUSAND/ΜL (ref 0–0.61)
EOSINOPHIL NFR BLD AUTO: 2 % (ref 0–6)
ERYTHROCYTE [DISTWIDTH] IN BLOOD BY AUTOMATED COUNT: 13.3 % (ref 11.6–15.1)
EST. AVERAGE GLUCOSE BLD GHB EST-MCNC: 140 MG/DL
GLUCOSE P FAST SERPL-MCNC: 139 MG/DL (ref 65–99)
HBA1C MFR BLD: 6.5 %
HCT VFR BLD AUTO: 44.3 % (ref 36.5–46.1)
HDLC SERPL-MCNC: 38 MG/DL
HGB BLD-MCNC: 14.9 G/DL (ref 12–15.4)
IMM GRANULOCYTES # BLD AUTO: 0.02 THOUSAND/UL (ref 0–0.2)
IMM GRANULOCYTES NFR BLD AUTO: 0 % (ref 0–2)
LDLC SERPL CALC-MCNC: 57 MG/DL (ref 0–100)
LYMPHOCYTES # BLD AUTO: 2.24 THOUSANDS/ΜL (ref 0.6–4.47)
LYMPHOCYTES NFR BLD AUTO: 36 % (ref 14–44)
MCH RBC QN AUTO: 29.8 PG (ref 26.8–34.3)
MCHC RBC AUTO-ENTMCNC: 33.6 G/DL (ref 31.4–37.4)
MCV RBC AUTO: 89 FL (ref 82–98)
MONOCYTES # BLD AUTO: 0.46 THOUSAND/ΜL (ref 0.17–1.22)
MONOCYTES NFR BLD AUTO: 7 % (ref 4–12)
NEUTROPHILS # BLD AUTO: 3.4 THOUSANDS/ΜL (ref 1.85–7.62)
NEUTS SEG NFR BLD AUTO: 54 % (ref 43–75)
NONHDLC SERPL-MCNC: 98 MG/DL
NRBC BLD AUTO-RTO: 0 /100 WBCS
PLATELET # BLD AUTO: 187 THOUSANDS/UL (ref 149–390)
PMV BLD AUTO: 12.6 FL (ref 8.9–12.7)
POTASSIUM SERPL-SCNC: 4.3 MMOL/L (ref 3.5–5.3)
PROT SERPL-MCNC: 7.4 G/DL (ref 6.4–8.2)
RBC # BLD AUTO: 5 MILLION/UL (ref 3.88–5.12)
SODIUM SERPL-SCNC: 137 MMOL/L (ref 136–145)
TRIGL SERPL-MCNC: 206 MG/DL
WBC # BLD AUTO: 6.29 THOUSAND/UL (ref 4.31–10.16)

## 2021-09-07 PROCEDURE — 83036 HEMOGLOBIN GLYCOSYLATED A1C: CPT

## 2021-09-07 PROCEDURE — 80061 LIPID PANEL: CPT

## 2021-09-07 PROCEDURE — 85025 COMPLETE CBC W/AUTO DIFF WBC: CPT

## 2021-09-07 PROCEDURE — 36415 COLL VENOUS BLD VENIPUNCTURE: CPT

## 2021-09-07 PROCEDURE — 80053 COMPREHEN METABOLIC PANEL: CPT

## 2021-09-10 ENCOUNTER — OFFICE VISIT (OUTPATIENT)
Dept: INTERNAL MEDICINE CLINIC | Facility: CLINIC | Age: 66
End: 2021-09-10
Payer: MEDICARE

## 2021-09-10 VITALS
TEMPERATURE: 97.4 F | WEIGHT: 259.8 LBS | OXYGEN SATURATION: 98 % | HEIGHT: 69 IN | SYSTOLIC BLOOD PRESSURE: 140 MMHG | HEART RATE: 68 BPM | DIASTOLIC BLOOD PRESSURE: 82 MMHG | BODY MASS INDEX: 38.48 KG/M2

## 2021-09-10 DIAGNOSIS — R73.01 IMPAIRED FASTING GLUCOSE: ICD-10-CM

## 2021-09-10 DIAGNOSIS — E78.2 MIXED HYPERLIPIDEMIA: ICD-10-CM

## 2021-09-10 DIAGNOSIS — I10 BENIGN ESSENTIAL HYPERTENSION: Primary | ICD-10-CM

## 2021-09-10 PROBLEM — E66.01 OBESITY, MORBID (HCC): Status: ACTIVE | Noted: 2021-09-10

## 2021-09-10 PROCEDURE — 99214 OFFICE O/P EST MOD 30 MIN: CPT | Performed by: INTERNAL MEDICINE

## 2021-09-10 RX ORDER — LANOLIN ALCOHOL/MO/W.PET/CERES
1000 CREAM (GRAM) TOPICAL DAILY
COMMUNITY

## 2021-09-10 NOTE — PROGRESS NOTES
Assessment/Plan:       Chronic problems are stable  Continue present medications  Continue diet and exercise  Ordered labs for next visit  Quality Measures:       Return in about 3 months (around 12/10/2021) for Recheck  No problem-specific Assessment & Plan notes found for this encounter  Diagnoses and all orders for this visit:    Benign essential hypertension  -     Comprehensive metabolic panel; Future  -     CBC and differential; Future  -     Lipid panel; Future    Impaired fasting glucose  -     Hemoglobin A1C; Future    Mixed hyperlipidemia    Other orders  -     vitamin B-12 (VITAMIN B-12) 1,000 mcg tablet; Take 1,000 mcg by mouth daily          Subjective:      Patient ID: Lorena Watson is a 78 y o  adult  Patient comes in today for routine follow-up  She has been working hard on her diet and has lost weight  Her sugars are down  Cholesterol is down  Blood pressure is controlled  Taking her medicines as directed  Following her diet  She is having some trouble with her right knee but she just wants to keep an eye on it for now  She really does not want to go anywhere because of the COVID cases increasing        ALLERGIES:  No Known Allergies    CURRENT MEDICATIONS:    Current Outpatient Medications:     atenolol (TENORMIN) 50 mg tablet, TAKE 2 TABLETS BY MOUTH AT  BEDTIME, Disp: 180 tablet, Rfl: 3    cholecalciferol (VITAMIN D3) 1,000 units tablet, Take 1,000 Units by mouth daily at bedtime, Disp: , Rfl:     losartan-hydrochlorothiazide (HYZAAR) 100-25 MG per tablet, TAKE 1 TABLET BY MOUTH  DAILY, Disp: 90 tablet, Rfl: 3    multivitamin (THERAGRAN) TABS, Take 1 tablet by mouth daily, Disp: , Rfl:     prednisoLONE acetate (PRED FORTE) 1 % ophthalmic suspension, INSTILL ONE DROP IN LEFT EYE TWICE A DAY, Disp: , Rfl: 1    simvastatin (ZOCOR) 40 mg tablet, TAKE 1 TABLET BY MOUTH  DAILY AT BEDTIME, Disp: 90 tablet, Rfl: 3    vitamin B-12 (VITAMIN B-12) 1,000 mcg tablet, Take 1,000 mcg by mouth daily, Disp: , Rfl:     ACTIVE PROBLEM LIST:  Patient Active Problem List   Diagnosis    Arthritis    Benign essential hypertension    Detached retina, left    Mixed hyperlipidemia    Impaired fasting glucose    Varicose veins of lower extremity    Parotid mass    Thyroid nodule    Parotid neoplasm    Obesity, morbid (HCC)       PAST MEDICAL HISTORY:  Past Medical History:   Diagnosis Date    Cataract     07/16/2017    Hyperlipidemia     Hypertension        PAST SURGICAL HISTORY:  Past Surgical History:   Procedure Laterality Date    CARPAL TUNNEL RELEASE Left     CATARACT EXTRACTION Left     COLONOSCOPY      EYE SURGERY      [place] COLONOSCOPY FLX DX W/COLLJ SPEC WHEN PFRMD N/A 8/29/2017    Procedure: COLONOSCOPY;  Surgeon: Bulmaro Sarah MD;  Location: [facility]; Service: Gastroenterology    RETINAL DETACHMENT SURGERY Left        FAMILY HISTORY:  Family History   Problem Relation Age of Onset    Coronary artery disease Mother     Diabetes Mother     COPD Father     Coronary artery disease Brother     Valvular heart disease Brother     Cancer Daughter        SOCIAL HISTORY:  Social History     Socioeconomic History    Marital status:      Spouse name: Not on file    Number of children: 2    Years of education: Not on file    Highest education level: Not on file   Occupational History    Occupation:       Comment: retired    Tobacco Use    Smoking status: Never Smoker    Smokeless tobacco: Never Used   Substance and Sexual Activity    Alcohol use: Yes     Comment: RARELY,    Drug use: No    Sexual activity: Yes     Partners: Male   Other Topics Concern    Not on file   Social History Narrative    Brushes teeth twice a day    Dental care    Engages in instrumental music     Exercise:   Yard work     Exercises reg,      Social Determinants of Health     Financial Resource Strain:     Difficulty of Paying Living Expenses:    Food Insecurity:     Worried About Running Out of Food in the Last Year:     [address] in the Last Year:    Transportation Needs:     Lack of Transportation (Medical):  Lack of Transportation (Non-Medical):    Physical Activity:     Days of Exercise per Week:     Minutes of Exercise per Session:    Stress:     Feeling of Stress :    Social Connections:     Frequency of Communication with Friends and Family:     Frequency of Social Gatherings with Friends and Family:     Attends Alevism Services:     Active Member of Clubs or Organizations:     Attends Club or Organization Meetings:     Marital Status:    Intimate Partner Violence:     Fear of Current or Ex-Partner:     Emotionally Abused:     Physically Abused:     Sexually Abused:        Review of Systems   Respiratory: Negative for shortness of breath  Cardiovascular: Negative for chest pain  Gastrointestinal: Negative for abdominal pain  Objective:  Vitals:    09/10/21 0837   BP: 140/82   BP Location: Left arm   Patient Position: Sitting   Cuff Size: Large   Pulse: 68   Temp: (!) 97 4 °F (36 3 °C)   TempSrc: Tympanic   SpO2: 98%   Weight: 118 kg (259 lb 12 8 oz)   Height: 5' 9" (1 753 m)     Body mass index is 38 37 kg/m²  Physical Exam  Vitals and nursing note reviewed  Constitutional:       Appearance: Bertha Savage is well-developed  Cardiovascular:      Rate and Rhythm: Normal rate and regular rhythm  Heart sounds: Normal heart sounds  Pulmonary:      Effort: Pulmonary effort is normal       Breath sounds: Normal breath sounds  Abdominal:      Palpations: Abdomen is soft  Tenderness: There is no abdominal tenderness  Neurological:      Mental Status: Bertha Savage is alert and oriented to person, place, and time             RESULTS:    Recent Results (from the past 1008 hour(s))   Comprehensive metabolic panel    Collection Time: 09/07/21  8:38 AM   Result Value Ref Range    Sodium 137 136 - 145 mmol/L Potassium 4 3 3 5 - 5 3 mmol/L    Chloride 102 100 - 108 mmol/L    CO2 29 21 - 32 mmol/L    ANION GAP 6 4 - 13 mmol/L    BUN 15 5 - 25 mg/dL    Creatinine 0 57 (L) 0 60 - 1 30 mg/dL    Glucose, Fasting 139 (H) 65 - 99 mg/dL    Calcium 9 6 8 3 - 10 1 mg/dL    AST 20 5 - 45 U/L    ALT 30 12 - 78 U/L    Alkaline Phosphatase 72 46 - 116 U/L    Total Protein 7 4 6 4 - 8 2 g/dL    Albumin 4 0 3 5 - 5 0 g/dL    Total Bilirubin 2 07 (H) 0 20 - 1 00 mg/dL    eGFR     CBC and differential    Collection Time: 09/07/21  8:38 AM   Result Value Ref Range    WBC 6 29 4 31 - 10 16 Thousand/uL    RBC 5 00 3 88 - 5 12 Million/uL    Hemoglobin 14 9 12 0 - 15 4 g/dL    Hematocrit 44 3 36 5 - 46 1 %    MCV 89 82 - 98 fL    MCH 29 8 26 8 - 34 3 pg    MCHC 33 6 31 4 - 37 4 g/dL    RDW 13 3 11 6 - 15 1 %    MPV 12 6 8 9 - 12 7 fL    Platelets 428 087 - 945 Thousands/uL    nRBC 0 /100 WBCs    Neutrophils Relative 54 43 - 75 %    Immat GRANS % 0 0 - 2 %    Lymphocytes Relative 36 14 - 44 %    Monocytes Relative 7 4 - 12 %    Eosinophils Relative 2 0 - 6 %    Basophils Relative 1 0 - 1 %    Neutrophils Absolute 3 40 1 85 - 7 62 Thousands/µL    Immature Grans Absolute 0 02 0 00 - 0 20 Thousand/uL    Lymphocytes Absolute 2 24 0 60 - 4 47 Thousands/µL    Monocytes Absolute 0 46 0 17 - 1 22 Thousand/µL    Eosinophils Absolute 0 13 0 00 - 0 61 Thousand/µL    Basophils Absolute 0 04 0 00 - 0 10 Thousands/µL   Hemoglobin A1C    Collection Time: 09/07/21  8:38 AM   Result Value Ref Range    Hemoglobin A1C 6 5 (H) Normal 3 8-5 6%; PreDiabetic 5 7-6 4%; Diabetic >=6 5%; Glycemic control for adults with diabetes <7 0% %     mg/dl   Lipid panel    Collection Time: 09/07/21  8:38 AM   Result Value Ref Range    Cholesterol 136 50 - 200 mg/dL    Triglycerides 206 (H) <=150 mg/dL    HDL, Direct 38 (L) >=40 mg/dL    LDL Calculated 57 0 - 100 mg/dL    Non-HDL-Chol (CHOL-HDL) 98 mg/dl       This note was created with voice recognition software  Phonic, grammatical and spelling errors may be present within the note as a result

## 2021-09-20 ENCOUNTER — OFFICE VISIT (OUTPATIENT)
Dept: INTERNAL MEDICINE CLINIC | Facility: CLINIC | Age: 66
End: 2021-09-20
Payer: MEDICARE

## 2021-09-20 VITALS
HEART RATE: 82 BPM | SYSTOLIC BLOOD PRESSURE: 130 MMHG | OXYGEN SATURATION: 98 % | TEMPERATURE: 98.3 F | BODY MASS INDEX: 38.8 KG/M2 | DIASTOLIC BLOOD PRESSURE: 78 MMHG | WEIGHT: 262 LBS | HEIGHT: 69 IN | RESPIRATION RATE: 20 BRPM

## 2021-09-20 DIAGNOSIS — R35.0 URINARY FREQUENCY: ICD-10-CM

## 2021-09-20 DIAGNOSIS — N30.00 ACUTE CYSTITIS WITHOUT HEMATURIA: ICD-10-CM

## 2021-09-20 DIAGNOSIS — R35.0 URINARY FREQUENCY: Primary | ICD-10-CM

## 2021-09-20 LAB
SL AMB  POCT GLUCOSE, UA: ABNORMAL
SL AMB LEUKOCYTE ESTERASE,UA: 125
SL AMB POCT BILIRUBIN,UA: ABNORMAL
SL AMB POCT BLOOD,UA: 80
SL AMB POCT CLARITY,UA: CLEAR
SL AMB POCT COLOR,UA: YELLOW
SL AMB POCT KETONES,UA: ABNORMAL
SL AMB POCT NITRITE,UA: ABNORMAL
SL AMB POCT PH,UA: 6
SL AMB POCT SPECIFIC GRAVITY,UA: 1.02
SL AMB POCT URINE PROTEIN: 1
SL AMB POCT UROBILINOGEN: 3.2

## 2021-09-20 PROCEDURE — 81002 URINALYSIS NONAUTO W/O SCOPE: CPT | Performed by: INTERNAL MEDICINE

## 2021-09-20 PROCEDURE — 99214 OFFICE O/P EST MOD 30 MIN: CPT | Performed by: INTERNAL MEDICINE

## 2021-09-20 RX ORDER — NITROFURANTOIN 25; 75 MG/1; MG/1
100 CAPSULE ORAL 2 TIMES DAILY
Qty: 10 CAPSULE | Refills: 0 | Status: SHIPPED | OUTPATIENT
Start: 2021-09-20 | End: 2021-09-20 | Stop reason: SDUPTHER

## 2021-09-20 RX ORDER — NITROFURANTOIN 25; 75 MG/1; MG/1
100 CAPSULE ORAL 2 TIMES DAILY
Qty: 10 CAPSULE | Refills: 0 | Status: SHIPPED | OUTPATIENT
Start: 2021-09-20 | End: 2021-09-25

## 2021-09-20 NOTE — PROGRESS NOTES
BMI Counseling: Body mass index is 38 69 kg/m²  The BMI is above normal  Nutrition recommendations include encouraging healthy choices of fruits and vegetables  Rationale for BMI follow-up plan is due to patient being overweight or obese  Depression Screening and Follow-up Plan:   Clincally patient does not have depression  No treatment is required  Assessment/Plan:    No problem-specific Assessment & Plan notes found for this encounter  Diagnoses and all orders for this visit:    Urinary frequency  -     POCT urine dip  -     nitrofurantoin (MACROBID) 100 mg capsule; Take 1 capsule (100 mg total) by mouth 2 (two) times a day for 5 days    Acute cystitis without hematuria          Subjective:      Patient ID: Craig Mcgregor is a 78 y o  adult  Patient is here with c/o urinary complaints  C/o urinary frequency, dysuria, pelvic pain since last night  No fever no chills  The following portions of the patient's history were reviewed and updated as appropriate:   Marisela Blanton  has a past medical history of Cataract, Hyperlipidemia, and Hypertension    Marisela Blanton   Patient Active Problem List    Diagnosis Date Noted    Obesity, morbid ([address] ) 09/10/2021    Parotid neoplasm 10/23/2019    Parotid mass 10/21/2019    Thyroid nodule 10/21/2019    Arthritis 06/16/2017    Benign essential hypertension 06/16/2017    Detached retina, left 06/16/2017    Mixed hyperlipidemia 06/16/2017    Impaired fasting glucose 06/16/2017    Varicose veins of lower extremity 06/16/2017     Current Outpatient Medications   Medication Sig Dispense Refill    atenolol (TENORMIN) 50 mg tablet TAKE 2 TABLETS BY MOUTH AT  BEDTIME 180 tablet 3    cholecalciferol (VITAMIN D3) 1,000 units tablet Take 1,000 Units by mouth daily at bedtime      losartan-hydrochlorothiazide (HYZAAR) 100-25 MG per tablet TAKE 1 TABLET BY MOUTH  DAILY 90 tablet 3    multivitamin (THERAGRAN) TABS Take 1 tablet by mouth daily      prednisoLONE acetate (PRED FORTE) 1 % ophthalmic suspension INSTILL ONE DROP IN LEFT EYE TWICE A DAY  1    simvastatin (ZOCOR) 40 mg tablet TAKE 1 TABLET BY MOUTH  DAILY AT BEDTIME 90 tablet 3    vitamin B-12 (VITAMIN B-12) 1,000 mcg tablet Take 1,000 mcg by mouth daily      nitrofurantoin (MACROBID) 100 mg capsule Take 1 capsule (100 mg total) by mouth 2 (two) times a day for 5 days 10 capsule 0     No current facility-administered medications for this visit       Review of Systems   Genitourinary: Positive for difficulty urinating, dysuria, frequency and urgency  Objective:      /78 (BP Location: Left arm, Patient Position: Sitting, Cuff Size: Large)   Pulse 82   Temp 98 3 °F (36 8 °C) (Tympanic)   Resp 20   Ht 5' 9" (1 753 m)   Wt 119 kg (262 lb)   SpO2 98%   BMI 38 69 kg/m²          Physical Exam  Vitals and nursing note reviewed  Constitutional:       Appearance: Normal appearance  HENT:      Head: Normocephalic and atraumatic  Cardiovascular:      Rate and Rhythm: Normal rate  Pulmonary:      Effort: Pulmonary effort is normal    Musculoskeletal:         General: Normal range of motion  Cervical back: Normal range of motion  Skin:     General: Skin is warm and dry  Neurological:      General: No focal deficit present  Mental Status: Prabha Carey is alert and oriented to person, place, and time  Mental status is at baseline  Gait: Gait abnormal    Psychiatric:         Mood and Affect: Mood normal          Behavior: Behavior normal          Thought Content:  Thought content normal          Judgment: Judgment normal

## 2021-12-03 ENCOUNTER — APPOINTMENT (OUTPATIENT)
Dept: LAB | Facility: CLINIC | Age: 66
End: 2021-12-03
Payer: MEDICARE

## 2021-12-03 DIAGNOSIS — I10 BENIGN ESSENTIAL HYPERTENSION: ICD-10-CM

## 2021-12-03 DIAGNOSIS — R73.01 IMPAIRED FASTING GLUCOSE: ICD-10-CM

## 2021-12-03 LAB
ALBUMIN SERPL BCP-MCNC: 3.9 G/DL (ref 3.5–5)
ALP SERPL-CCNC: 71 U/L (ref 46–116)
ALT SERPL W P-5'-P-CCNC: 29 U/L (ref 12–78)
ANION GAP SERPL CALCULATED.3IONS-SCNC: 7 MMOL/L (ref 4–13)
AST SERPL W P-5'-P-CCNC: 15 U/L (ref 5–45)
BASOPHILS # BLD AUTO: 0.04 THOUSANDS/ΜL (ref 0–0.1)
BASOPHILS NFR BLD AUTO: 1 % (ref 0–1)
BILIRUB SERPL-MCNC: 1.8 MG/DL (ref 0.2–1)
BUN SERPL-MCNC: 19 MG/DL (ref 5–25)
CALCIUM SERPL-MCNC: 9.7 MG/DL (ref 8.3–10.1)
CHLORIDE SERPL-SCNC: 102 MMOL/L (ref 100–108)
CHOLEST SERPL-MCNC: 143 MG/DL
CO2 SERPL-SCNC: 29 MMOL/L (ref 21–32)
CREAT SERPL-MCNC: 0.63 MG/DL (ref 0.6–1.3)
EOSINOPHIL # BLD AUTO: 0.15 THOUSAND/ΜL (ref 0–0.61)
EOSINOPHIL NFR BLD AUTO: 2 % (ref 0–6)
ERYTHROCYTE [DISTWIDTH] IN BLOOD BY AUTOMATED COUNT: 13.5 % (ref 11.6–15.1)
GLUCOSE P FAST SERPL-MCNC: 142 MG/DL (ref 65–99)
HCT VFR BLD AUTO: 44.9 % (ref 36.5–46.1)
HDLC SERPL-MCNC: 37 MG/DL
HGB BLD-MCNC: 14.8 G/DL (ref 12–15.4)
IMM GRANULOCYTES # BLD AUTO: 0.03 THOUSAND/UL (ref 0–0.2)
IMM GRANULOCYTES NFR BLD AUTO: 0 % (ref 0–2)
LDLC SERPL CALC-MCNC: 56 MG/DL (ref 0–100)
LYMPHOCYTES # BLD AUTO: 2.57 THOUSANDS/ΜL (ref 0.6–4.47)
LYMPHOCYTES NFR BLD AUTO: 32 % (ref 14–44)
MCH RBC QN AUTO: 29.5 PG (ref 26.8–34.3)
MCHC RBC AUTO-ENTMCNC: 33 G/DL (ref 31.4–37.4)
MCV RBC AUTO: 90 FL (ref 82–98)
MONOCYTES # BLD AUTO: 0.54 THOUSAND/ΜL (ref 0.17–1.22)
MONOCYTES NFR BLD AUTO: 7 % (ref 4–12)
NEUTROPHILS # BLD AUTO: 4.67 THOUSANDS/ΜL (ref 1.85–7.62)
NEUTS SEG NFR BLD AUTO: 58 % (ref 43–75)
NONHDLC SERPL-MCNC: 106 MG/DL
NRBC BLD AUTO-RTO: 0 /100 WBCS
PLATELET # BLD AUTO: 233 THOUSANDS/UL (ref 149–390)
PMV BLD AUTO: 11.9 FL (ref 8.9–12.7)
POTASSIUM SERPL-SCNC: 4.5 MMOL/L (ref 3.5–5.3)
PROT SERPL-MCNC: 7.5 G/DL (ref 6.4–8.2)
RBC # BLD AUTO: 5.01 MILLION/UL (ref 3.88–5.12)
SODIUM SERPL-SCNC: 138 MMOL/L (ref 136–145)
TRIGL SERPL-MCNC: 248 MG/DL
WBC # BLD AUTO: 8 THOUSAND/UL (ref 4.31–10.16)

## 2021-12-03 PROCEDURE — 85025 COMPLETE CBC W/AUTO DIFF WBC: CPT

## 2021-12-03 PROCEDURE — 36415 COLL VENOUS BLD VENIPUNCTURE: CPT

## 2021-12-03 PROCEDURE — 80053 COMPREHEN METABOLIC PANEL: CPT

## 2021-12-03 PROCEDURE — 80061 LIPID PANEL: CPT

## 2021-12-03 PROCEDURE — 83036 HEMOGLOBIN GLYCOSYLATED A1C: CPT

## 2021-12-04 LAB
EST. AVERAGE GLUCOSE BLD GHB EST-MCNC: 140 MG/DL
HBA1C MFR BLD: 6.5 %

## 2021-12-06 ENCOUNTER — RA CDI HCC (OUTPATIENT)
Dept: OTHER | Facility: HOSPITAL | Age: 66
End: 2021-12-06

## 2021-12-09 ENCOUNTER — TELEPHONE (OUTPATIENT)
Dept: ADMINISTRATIVE | Facility: OTHER | Age: 66
End: 2021-12-09

## 2021-12-10 ENCOUNTER — TELEPHONE (OUTPATIENT)
Dept: ADMINISTRATIVE | Facility: OTHER | Age: 66
End: 2021-12-10

## 2021-12-10 ENCOUNTER — OFFICE VISIT (OUTPATIENT)
Dept: INTERNAL MEDICINE CLINIC | Facility: CLINIC | Age: 66
End: 2021-12-10
Payer: MEDICARE

## 2021-12-10 VITALS
OXYGEN SATURATION: 98 % | BODY MASS INDEX: 38.06 KG/M2 | RESPIRATION RATE: 16 BRPM | HEIGHT: 69 IN | WEIGHT: 257 LBS | DIASTOLIC BLOOD PRESSURE: 90 MMHG | SYSTOLIC BLOOD PRESSURE: 138 MMHG | HEART RATE: 79 BPM

## 2021-12-10 DIAGNOSIS — E66.01 OBESITY, MORBID (HCC): ICD-10-CM

## 2021-12-10 DIAGNOSIS — I10 BENIGN ESSENTIAL HYPERTENSION: ICD-10-CM

## 2021-12-10 DIAGNOSIS — E78.2 MIXED HYPERLIPIDEMIA: ICD-10-CM

## 2021-12-10 DIAGNOSIS — R73.01 IMPAIRED FASTING GLUCOSE: Primary | ICD-10-CM

## 2021-12-10 DIAGNOSIS — Z23 NEED FOR PNEUMOCOCCAL VACCINATION: ICD-10-CM

## 2021-12-10 PROCEDURE — G0009 ADMIN PNEUMOCOCCAL VACCINE: HCPCS | Performed by: INTERNAL MEDICINE

## 2021-12-10 PROCEDURE — 99214 OFFICE O/P EST MOD 30 MIN: CPT | Performed by: INTERNAL MEDICINE

## 2021-12-10 PROCEDURE — 90732 PPSV23 VACC 2 YRS+ SUBQ/IM: CPT | Performed by: INTERNAL MEDICINE

## 2021-12-10 RX ORDER — METHENAMINE HIPPURATE 1000 MG/1
1 TABLET ORAL 2 TIMES DAILY WITH MEALS
COMMUNITY
Start: 2021-11-16 | End: 2022-04-11 | Stop reason: ALTCHOICE

## 2021-12-30 ENCOUNTER — CONSULT (OUTPATIENT)
Dept: SURGERY | Facility: CLINIC | Age: 66
End: 2021-12-30
Payer: MEDICARE

## 2021-12-30 VITALS
SYSTOLIC BLOOD PRESSURE: 126 MMHG | HEIGHT: 69 IN | WEIGHT: 261 LBS | HEART RATE: 75 BPM | BODY MASS INDEX: 38.66 KG/M2 | DIASTOLIC BLOOD PRESSURE: 86 MMHG

## 2021-12-30 DIAGNOSIS — M79.89 MASS OF SOFT TISSUE OF UPPER ARM: Primary | ICD-10-CM

## 2021-12-30 PROCEDURE — 99203 OFFICE O/P NEW LOW 30 MIN: CPT | Performed by: STUDENT IN AN ORGANIZED HEALTH CARE EDUCATION/TRAINING PROGRAM

## 2021-12-30 RX ORDER — CHLORHEXIDINE GLUCONATE 4 G/100ML
SOLUTION TOPICAL DAILY PRN
Status: CANCELLED | OUTPATIENT
Start: 2021-12-30

## 2021-12-30 NOTE — H&P (VIEW-ONLY)
Assessment/Plan:  69-year-old female with mass of left upper extremity  -patient presents with massive left upper extremity  -causes her some intermittent discomfort and has slowly grown  -on exam there is a mass just distal to the elbow attached to the skin in the subcutaneous tissue roughly 3 x 3 cm  -no signs of infection at this time  -most likely this represents a sebaceous cyst versus lipoma  -CMP and CBC from 12/03/2021 reviewed  -primary care physician note from 12/10/2021 reviewed  -will plan for excision of left arm mass under local anesthesia    All risks, benefits, alternatives of the procedure were discussed at length with the patient  Risks include bleeding, infection, damage to surrounding structures, recurrence  All questions were answered to satisfaction  The patient voiced understanding and signed consent  1  Mass of soft tissue of upper arm  -     Ambulatory referral to General Surgery  -     Case request operating room: EXCISION BIOPSY TISSUE LESION/MASS UPPER EXTREMITY; Standing  -     Case request operating room: EXCISION BIOPSY TISSUE LESION/MASS UPPER EXTREMITY               Subjective:      Patient ID: Natalia Martinez is a 78 y o  adult  Triage Notes:    Patient is a 69-year-old female who presents to office for evaluation due to left forearm mass  Patient states she has had a mass on her left forearm for some time and it has slowly grown  Causes intermittent discomfort  He has never gotten red or drained anything  She offers no other complaints  She states she is up-to-date on her GI screening and mammograms  The following portions of the patient's history were reviewed and updated as appropriate:   Fausto Teixeira  has a past medical history of Cataract, Hyperlipidemia, Hypertension, and Mass of arm    Fausto Teixeira   Patient Active Problem List    Diagnosis Date Noted    Mass of soft tissue of upper arm 12/30/2021    Obesity, morbid ([facility] [address] ) 09/10/2021    Parotid neoplasm 10/23/2019    Parotid mass 10/21/2019    Thyroid nodule 10/21/2019    Arthritis 06/16/2017    Benign essential hypertension 06/16/2017    Detached retina, left 06/16/2017    Mixed hyperlipidemia 06/16/2017    Impaired fasting glucose 06/16/2017    Varicose veins of lower extremity 06/16/2017     Lance Sarah  has a past surgical history that includes Retinal detachment surgery (Left); Eye surgery; pr colonoscopy flx dx w/collj spec when pfrmd (N/A, 8/29/2017); Colonoscopy; Carpal tunnel release (Left); and Cataract extraction (Left)  Iván Odonnell's family history includes COPD in Iván Odonnell's father; Cancer in Iván Odonnell's daughter; Coronary artery disease in Iván Odonnell's brother and mother; Diabetes in Iván Odonnell's mother; Valvular heart disease in Iván Odonnell's brother  Lance Sarah  reports that Lance Sarah has never smoked  Lance Sarah has never used smokeless tobacco  Lance Sarah reports current alcohol use  Lance Sarah reports that Lance Sarah does not use drugs  Current Outpatient Medications on File Prior to Visit   Medication Sig    atenolol (TENORMIN) 50 mg tablet TAKE 2 TABLETS BY MOUTH AT  BEDTIME    cholecalciferol (VITAMIN D3) 1,000 units tablet Take 1,000 Units by mouth daily at bedtime    losartan-hydrochlorothiazide (HYZAAR) 100-25 MG per tablet TAKE 1 TABLET BY MOUTH  DAILY    methenamine hippurate (HIPREX) 1 g tablet 1 g 2 (two) times a day with meals      multivitamin (THERAGRAN) TABS Take 1 tablet by mouth daily    prednisoLONE acetate (PRED FORTE) 1 % ophthalmic suspension daily      simvastatin (ZOCOR) 40 mg tablet TAKE 1 TABLET BY MOUTH  DAILY AT BEDTIME    vitamin B-12 (VITAMIN B-12) 1,000 mcg tablet Take 1,000 mcg by mouth daily     No current facility-administered medications on file prior to visit  Lance Sarah is allergic to ciprofloxacin       Review of Systems   Constitutional: Negative for chills, fatigue and fever     HENT: Negative for congestion, hearing loss, rhinorrhea and sore throat  Eyes: Negative for pain and discharge  Respiratory: Negative for cough, chest tightness and shortness of breath  Cardiovascular: Negative for chest pain and palpitations  Gastrointestinal: Negative for abdominal pain, constipation, diarrhea, nausea and vomiting  Endocrine: Negative for cold intolerance and heat intolerance  Genitourinary: Negative for difficulty urinating and dysuria  Musculoskeletal: Negative for back pain and neck pain  Skin: Negative for color change and rash  Positive left forearm mass   Allergic/Immunologic: Negative for environmental allergies and food allergies  Neurological: Negative for seizures and headaches  Hematological: Negative for adenopathy  Does not bruise/bleed easily  Psychiatric/Behavioral: Negative for confusion and hallucinations  Objective:      /86   Pulse 75   Ht 5' 9" (1 753 m)   Wt 118 kg (261 lb)   BMI 38 54 kg/m²     Below is the patient's most recent value for Albumin, ALT, AST, BUN, Calcium, Chloride, Cholesterol, CO2, Creatinine, GFR, Glucose, HDL, Hematocrit, Hemoglobin, Hemoglobin A1C, LDL, Magnesium, Phosphorus, Platelets, Potassium, PSA, Sodium, Triglycerides, and WBC  Lab Results   Component Value Date    ALT 29 12/03/2021    AST 15 12/03/2021    BUN 19 12/03/2021    CALCIUM 9 7 12/03/2021     12/03/2021    CO2 29 12/03/2021    CREATININE 0 63 12/03/2021    HDL 37 12/03/2021    HCT 44 9 12/03/2021    HGB 14 8 12/03/2021    HGBA1C 6 5 (H) 12/03/2021     12/03/2021    K 4 5 12/03/2021    TRIG 248 (H) 12/03/2021    WBC 8 00 12/03/2021     Note: for a comprehensive list of the patient's lab results, access the Results Review activity  Physical Exam  Constitutional:       Appearance: Normal appearance  HENT:      Head: Normocephalic and atraumatic  Nose: Nose normal    Eyes:      General: No scleral icterus       Conjunctiva/sclera: Conjunctivae normal  Cardiovascular:      Rate and Rhythm: Normal rate and regular rhythm  Heart sounds: Normal heart sounds  Pulmonary:      Effort: Pulmonary effort is normal       Breath sounds: Normal breath sounds  Abdominal:      General: There is no distension  Palpations: Abdomen is soft  Tenderness: There is no abdominal tenderness  Musculoskeletal:         General: No signs of injury  Skin:     General: Skin is warm  Coloration: Skin is not jaundiced  Comments: There is a mass just distal to the elbow on the left upper extremity roughly 3 x 3 cm, masses tethered to the skin and freely mobile   Neurological:      General: No focal deficit present  Mental Status: Ritchie Carvajal is alert and oriented to person, place, and time     Psychiatric:         Mood and Affect: Mood normal          Behavior: Behavior normal

## 2022-01-10 NOTE — RESULT NOTES
Verified Results  (1) URINE MICROSCOPIC [id number] 12:54PM Ismael Edwards Order Number: [id number]     Test Name Result Flag Reference   CLINICAL REPORT (Report)     Test:        Urine culture  Specimen Type:   Urine  Specimen Date:   8/7/2017 12:54 PM  Result Date:    8/9/2017 8:06 PM  Result Status:   Final result  Resulting Lab:   [facility] [address] [place] [address]            Tel: [phone]      CULTURE                                       ------------------                                   >100,000 cfu/ml Klebsiella pneumoniae      SUSCEPTIBILITY                                   ------------------                                                       Klebsiella pneumoniae  METHOD                 PAMELLA  -------------------------------------  --------------------------  AMPICILLIN ($$)             >16 00 ug/ml Resistant  AMPICILLIN + SULBACTAM ($)       >16/8 ug/ml  Resistant  AZTREONAM ($$$)             <=8 ug/ml   Susceptible  CEFAZOLIN ($)              <=8 00 ug/ml Susceptible  CIPROFLOXACIN ($)            <=1 00 ug/ml Susceptible  GENTAMICIN ($$)             <=4 ug/ml   Susceptible  LEVOFLOXACIN ($)            <=2 00 ug/ml Susceptible  NITROFURANTOIN             64 ug/ml   Intermediate  PIPERACILLIN + TAZOBACTAM ($$$)     <=16 ug/ml  Susceptible  TETRACYCLINE              <=4 ug/ml   Susceptible  TOBRAMYCIN ($)             <=4 ug/ml   Susceptible  TRIMETHOPRIM + SULFAMETHOXAZOLE ($$$)  <=2/38 ug/ml Susceptible 7

## 2022-01-20 ENCOUNTER — APPOINTMENT (OUTPATIENT)
Dept: PREADMISSION TESTING | Facility: HOSPITAL | Age: 67
End: 2022-01-20
Payer: MEDICARE

## 2022-01-24 ENCOUNTER — HOSPITAL ENCOUNTER (OUTPATIENT)
Facility: HOSPITAL | Age: 67
Setting detail: OUTPATIENT SURGERY
Discharge: HOME | End: 2022-01-24
Attending: STUDENT IN AN ORGANIZED HEALTH CARE EDUCATION/TRAINING PROGRAM | Admitting: STUDENT IN AN ORGANIZED HEALTH CARE EDUCATION/TRAINING PROGRAM
Payer: MEDICARE

## 2022-01-24 VITALS
SYSTOLIC BLOOD PRESSURE: 160 MMHG | RESPIRATION RATE: 32 BRPM | OXYGEN SATURATION: 97 % | WEIGHT: 260.36 LBS | HEIGHT: 69 IN | HEART RATE: 70 BPM | TEMPERATURE: 97.1 F | DIASTOLIC BLOOD PRESSURE: 91 MMHG | BODY MASS INDEX: 38.56 KG/M2

## 2022-01-24 DIAGNOSIS — M79.89 MASS OF SOFT TISSUE OF UPPER ARM: Primary | ICD-10-CM

## 2022-01-24 PROCEDURE — 88307 TISSUE EXAM BY PATHOLOGIST: CPT | Performed by: SPECIALIST

## 2022-01-24 PROCEDURE — 12032 INTMD RPR S/A/T/EXT 2.6-7.5: CPT | Performed by: STUDENT IN AN ORGANIZED HEALTH CARE EDUCATION/TRAINING PROGRAM

## 2022-01-24 PROCEDURE — 11403 EXC TR-EXT B9+MARG 2.1-3CM: CPT | Performed by: STUDENT IN AN ORGANIZED HEALTH CARE EDUCATION/TRAINING PROGRAM

## 2022-01-24 RX ORDER — CHLORHEXIDINE GLUCONATE 4 G/100ML
SOLUTION TOPICAL DAILY PRN
Status: DISCONTINUED | OUTPATIENT
Start: 2022-01-24 | End: 2022-01-24 | Stop reason: HOSPADM

## 2022-01-24 RX ORDER — SODIUM CHLORIDE, SODIUM LACTATE, POTASSIUM CHLORIDE, CALCIUM CHLORIDE 600; 310; 30; 20 MG/100ML; MG/100ML; MG/100ML; MG/100ML
50 INJECTION, SOLUTION INTRAVENOUS CONTINUOUS
Status: DISCONTINUED | OUTPATIENT
Start: 2022-01-24 | End: 2022-01-24 | Stop reason: HOSPADM

## 2022-01-24 RX ORDER — MAGNESIUM HYDROXIDE 1200 MG/15ML
LIQUID ORAL AS NEEDED
Status: DISCONTINUED | OUTPATIENT
Start: 2022-01-24 | End: 2022-01-24 | Stop reason: HOSPADM

## 2022-01-24 RX ORDER — DOCUSATE SODIUM 100 MG/1
100 CAPSULE, LIQUID FILLED ORAL 3 TIMES DAILY PRN
Qty: 30 CAPSULE | Refills: 0 | Status: SHIPPED | OUTPATIENT
Start: 2022-01-24 | End: 2022-01-24 | Stop reason: SDUPTHER

## 2022-01-24 RX ORDER — LIDOCAINE HYDROCHLORIDE AND EPINEPHRINE 10; 10 MG/ML; UG/ML
INJECTION, SOLUTION INFILTRATION; PERINEURAL AS NEEDED
Status: DISCONTINUED | OUTPATIENT
Start: 2022-01-24 | End: 2022-01-24 | Stop reason: HOSPADM

## 2022-01-24 RX ORDER — CEFAZOLIN SODIUM 2 G/50ML
2000 SOLUTION INTRAVENOUS ONCE
Status: COMPLETED | OUTPATIENT
Start: 2022-01-24 | End: 2022-01-24

## 2022-01-24 RX ORDER — TRAMADOL HYDROCHLORIDE 50 MG/1
50 TABLET ORAL EVERY 6 HOURS PRN
Qty: 12 TABLET | Refills: 0 | Status: SHIPPED | OUTPATIENT
Start: 2022-01-24 | End: 2022-01-24 | Stop reason: SDUPTHER

## 2022-01-24 RX ORDER — TRAMADOL HYDROCHLORIDE 50 MG/1
50 TABLET ORAL EVERY 6 HOURS PRN
Qty: 12 TABLET | Refills: 0 | Status: SHIPPED | OUTPATIENT
Start: 2022-01-24 | End: 2022-01-24 | Stop reason: HOSPADM

## 2022-01-24 RX ORDER — DOCUSATE SODIUM 100 MG/1
100 CAPSULE, LIQUID FILLED ORAL 3 TIMES DAILY PRN
Qty: 30 CAPSULE | Refills: 0 | Status: SHIPPED | OUTPATIENT
Start: 2022-01-24 | End: 2022-04-11 | Stop reason: ALTCHOICE

## 2022-01-24 RX ORDER — GINSENG 100 MG
CAPSULE ORAL AS NEEDED
Status: DISCONTINUED | OUTPATIENT
Start: 2022-01-24 | End: 2022-01-24 | Stop reason: HOSPADM

## 2022-01-24 RX ORDER — ACETAMINOPHEN AND CODEINE PHOSPHATE 300; 30 MG/1; MG/1
1 TABLET ORAL EVERY 6 HOURS PRN
Qty: 12 TABLET | Refills: 0 | Status: SHIPPED | OUTPATIENT
Start: 2022-01-24 | End: 2022-02-03

## 2022-01-24 RX ADMIN — CEFAZOLIN SODIUM 2000 MG: 2 SOLUTION INTRAVENOUS at 07:27

## 2022-01-24 RX ADMIN — SODIUM CHLORIDE, SODIUM LACTATE, POTASSIUM CHLORIDE, AND CALCIUM CHLORIDE 50 ML/HR: .6; .31; .03; .02 INJECTION, SOLUTION INTRAVENOUS at 07:24

## 2022-01-24 NOTE — OP NOTE
PERATIVE REPORT  PATIENT NAME: Carey Hogue    :  1955  MRN: [id number]  Pt Location: [facility] OR ROOM 02    SURGERY DATE: 2022    Surgeon(s) and Role:     * Everardo Walton DO - Primary    Preop Diagnosis:  Mass of soft tissue of upper arm [M79 89]    Post-Op Diagnosis Codes:     * Mass of soft tissue of upper arm [M79 89]    Procedure(s) (LRB):  EXCISION BIOPSY TISSUE LESION/MASS UPPER EXTREMITY (Left)    Specimen(s):  ID Type Source Tests Collected by Time Destination   1 : Left Upper Extremity Mass Tissue Arm, Left TISSUE EXAM Everardo Walton DO 2022 [id number]        Estimated Blood Loss:   Minimal    Drains:  None    Anesthesia Type:   Local    Operative Indications: Mass of soft tissue of upper arm [M79 89]    Operative Findings:  Mass of the left upper extremity consistent with process excised with grossly negative margins measuring roughly 3 x 2 5 x 3 cm  The mass was into the subcutaneous tissue but did not go any deeper    Complications:   None    Procedure and Technique:  The patient was seen again in Preoperative Holding  All the risks, benefits, complications, treatment options, and expected outcomes were discussed with the patient and family at length  All questions were answered to satisfaction  There was concurrence with the proposed plan and informed consent was obtained  The site of surgery was properly noted/marked  The patient was taken to Operating Room, identified, and the procedure verified as excision of left upper extremity mass  The patient was placed in the supine position on the operating room table  The patient had received preoperative antibiotics and SCDs placed on the bilateral lower extremities  The left upper extremity was then prepped and draped in the usual sterile fashion using ChloraPrep  A Time-Out was then performed with all involved present confirming the correct patient, procedure, antibiotics, and any additional concerns       Elliptical incision was made around the mass to make sure to the pore  Using 1% lidocaine with epinephrine the skin was anesthetized  Using a 15  Blade the skin was incised  Using the scalpel and also the Metzenbaum scissors the mass was then cored out of subcutaneous tissue  The cavity was then inspected and hemostasis was ensured with electrocautery  The cavity was then irrigated  Subcutaneous stitches and deep dermal stitches were then placed with interrupted 3-0 Vicryl  Using 2-0 nylon skin was approximated fashion  The left upper extremity incision was then cleansed and dried and bacitracin, Telfa, 4 x 4, Tegaderm was used to cover the site  All instrument, sponge, and needle counts were noted to be correct at the conclusion of the case  The patient was brought to the PACU in stable and satisfactory condition       I was present for the entire procedure and A qualified resident physician was not available    Patient Disposition:  hemodynamically stable      SIGNATURE: Roque Olea DO  DATE: January 24, 2022  TIME: 8:13 AM

## 2022-01-24 NOTE — INTERVAL H&P NOTE
H&P reviewed  After examining the patient I find no changes in the patients condition since the H&P had been written      Vitals:    01/24/22 0715   BP: 166/94   Pulse: 72   Resp: 21   Temp: 97 9 °F (36 6 °C)   SpO2: 97%

## 2022-01-24 NOTE — DISCHARGE INSTRUCTIONS
Your incisions are covered with Telfa, 4 x 4 gauze, Tegaderm  In 2 days you may remove your dressing, the stitches will remain on your skin but the 4 x 4 gauze and Tegaderm can be removed  The stitches will be removed in the office  After your dressings are removed you may shower  Do not soak your incisions including tub baths or swimming  You may shower and let water and soap wash over incisions  Do not scrub your incisions  You may resume your normal diet as tolerated  Do not make any important decisions and do not drive while taking narcotic pain medication  You are prescribed Tylenol #3 for severe pain  Take only as needed  Take Colace to soften your stool while taking narcotic pain medication  You may take Tylenol over-the-counter as needed for pain, follow instructions on the bottle  You may take Ibuprofen over-the-counter as needed for pain, follow instructions on the bottle  You may alternate Tylenol and Ibuprofen if needed, but do not take at the same time  Follow-up with your Surgeon in 2 weeks, call the office for an appointment  You will receive a survey via Email in regards to your same day surgery experience  Please fill out the survey to let us know how we did with your care

## 2022-02-02 ENCOUNTER — OFFICE VISIT (OUTPATIENT)
Dept: SURGERY | Facility: CLINIC | Age: 67
End: 2022-02-02

## 2022-02-02 VITALS
RESPIRATION RATE: 16 BRPM | SYSTOLIC BLOOD PRESSURE: 160 MMHG | HEIGHT: 69 IN | DIASTOLIC BLOOD PRESSURE: 80 MMHG | HEART RATE: 71 BPM | TEMPERATURE: 98 F | WEIGHT: 265 LBS | OXYGEN SATURATION: 95 % | BODY MASS INDEX: 39.25 KG/M2

## 2022-02-02 DIAGNOSIS — M79.89 MASS OF SOFT TISSUE OF UPPER ARM: Primary | ICD-10-CM

## 2022-02-02 PROCEDURE — 99024 POSTOP FOLLOW-UP VISIT: CPT | Performed by: STUDENT IN AN ORGANIZED HEALTH CARE EDUCATION/TRAINING PROGRAM

## 2022-02-02 NOTE — PROGRESS NOTES
Assessment/Plan:  79-year-old female status post excision of left upper extremity mass on 01/24/2022  -patient denies any pain in the area  -incision is healing well without erythema induration or drainage  -stitches removed in office  -pathology reviewed with patient  -follow-up in office as needed    Pathology Results:  Final Diagnosis   A  Soft Tissue, Left Upper Extremity Mass:  - Benign follicular cyst, irritated and inflamed       - Intradepartmental consultation concurs with the diagnosis  I reviewed the pathology report and discussed the findings with the patient and their accompanying family or caregiver, if present  All questions were answered to satisfaction and the patient along with family or caregiver, if present, voiced understanding  1  Mass of soft tissue of upper arm               Subjective:      Patient ID: Hailey Nicolas is a 78 y o  female  Triage Notes:    Patient is a 79-year-old female who presents to office for evaluation status post excision of left upper extremity mass  She denies any pain in the area  She states it is healing well  She otherwise has no complaints  The following portions of the patient's history were reviewed and updated as appropriate: allergies, current medications, past family history, past medical history, past social history, past surgical history and problem list     Review of Systems   Constitutional: Negative for chills, fatigue and fever  HENT: Negative for congestion, hearing loss, rhinorrhea and sore throat  Eyes: Negative for pain and discharge  Respiratory: Negative for cough, chest tightness and shortness of breath  Cardiovascular: Negative for chest pain and palpitations  Gastrointestinal: Negative for abdominal pain, constipation, diarrhea, nausea and vomiting  Endocrine: Negative for cold intolerance and heat intolerance  Genitourinary: Negative for difficulty urinating and dysuria     Musculoskeletal: Negative for back pain and neck pain  Skin: Negative for color change and rash  Allergic/Immunologic: Negative for environmental allergies and food allergies  Neurological: Negative for seizures and headaches  Hematological: Negative for adenopathy  Does not bruise/bleed easily  Psychiatric/Behavioral: Negative for confusion and hallucinations  Objective:      /80   Pulse 71   Temp 98 °F (36 7 °C) (Temporal)   Resp 16   Ht 5' 9" (1 753 m)   Wt 120 kg (265 lb)   SpO2 95%   BMI 39 13 kg/m²     Below is the patient's most recent value for Albumin, ALT, AST, BUN, Calcium, Chloride, Cholesterol, CO2, Creatinine, GFR, Glucose, HDL, Hematocrit, Hemoglobin, Hemoglobin A1C, LDL, Magnesium, Phosphorus, Platelets, Potassium, PSA, Sodium, Triglycerides, and WBC  Lab Results   Component Value Date    ALT 29 12/03/2021    AST 15 12/03/2021    BUN 19 12/03/2021    CALCIUM 9 7 12/03/2021     12/03/2021    CO2 29 12/03/2021    CREATININE 0 63 12/03/2021    HDL 37 12/03/2021    HCT 44 9 12/03/2021    HGB 14 8 12/03/2021    HGBA1C 6 5 (H) 12/03/2021     12/03/2021    K 4 5 12/03/2021    TRIG 248 (H) 12/03/2021    WBC 8 00 12/03/2021     Note: for a comprehensive list of the patient's lab results, access the Results Review activity  Physical Exam  Constitutional:       Appearance: Normal appearance  HENT:      Head: Normocephalic and atraumatic  Nose: Nose normal    Eyes:      General: No scleral icterus  Conjunctiva/sclera: Conjunctivae normal    Cardiovascular:      Rate and Rhythm: Normal rate  Pulmonary:      Effort: Pulmonary effort is normal    Musculoskeletal:         General: No signs of injury  Skin:     General: Skin is warm  Coloration: Skin is not jaundiced  Comments: Left upper extremity incision healing well without erythema induration or drainage, stitches intact   Neurological:      General: No focal deficit present        Mental Status: She is alert and oriented to person, place, and time  Psychiatric:         Mood and Affect: Mood normal          Behavior: Behavior normal              Suture removal    Date/Time: 2/2/2022 2:15 PM  Performed by: Ranulfo More DO  Authorized by: Ranulfo More DO   Universal Protocol:  Consent: Verbal consent obtained  Risks and benefits: risks, benefits and alternatives were discussed  Consent given by: patient  Patient understanding: patient states understanding of the procedure being performed  Patient identity confirmed: verbally with patient        Patient location:  Clinic  Location:     Laterality:  Left    Location:  Upper extremity    Upper extremity location:  Elbow    Elbow location:  L elbow  Procedure details: Tools used:  Suture removal kit    Wound appearance:  Good wound healing, no sign(s) of infection and clean  Post-procedure details:     Post-removal:  No dressing applied    Patient tolerance of procedure:   Tolerated well, no immediate complications

## 2022-02-21 NOTE — TELEPHONE ENCOUNTER
Please let her know that the results aren't in yet  It's taking several days for these studies to be read  · Patient with history of recurrent cellulitis  · Patient states she has wound nurses following at home with frequent dressing changes and reports her legs have been "stable"  · Wound Care Consulted and appreciate their recommendations    · Nystatin powder p r n  for moisture in inguinal region  · Continue supportive care

## 2022-03-28 ENCOUNTER — APPOINTMENT (OUTPATIENT)
Dept: LAB | Facility: CLINIC | Age: 67
End: 2022-03-28
Payer: MEDICARE

## 2022-03-28 DIAGNOSIS — E78.2 MIXED HYPERLIPIDEMIA: ICD-10-CM

## 2022-03-28 DIAGNOSIS — R73.01 IMPAIRED FASTING GLUCOSE: ICD-10-CM

## 2022-03-28 LAB
ALBUMIN SERPL BCP-MCNC: 4.2 G/DL (ref 3.5–5)
ALP SERPL-CCNC: 66 U/L (ref 46–116)
ALT SERPL W P-5'-P-CCNC: 34 U/L (ref 12–78)
ANION GAP SERPL CALCULATED.3IONS-SCNC: 6 MMOL/L (ref 4–13)
AST SERPL W P-5'-P-CCNC: 23 U/L (ref 5–45)
BASOPHILS # BLD AUTO: 0.06 THOUSANDS/ΜL (ref 0–0.1)
BASOPHILS NFR BLD AUTO: 1 % (ref 0–1)
BILIRUB SERPL-MCNC: 1.81 MG/DL (ref 0.2–1)
BUN SERPL-MCNC: 16 MG/DL (ref 5–25)
CALCIUM SERPL-MCNC: 9.5 MG/DL (ref 8.3–10.1)
CHLORIDE SERPL-SCNC: 100 MMOL/L (ref 100–108)
CHOLEST SERPL-MCNC: 136 MG/DL
CO2 SERPL-SCNC: 27 MMOL/L (ref 21–32)
CREAT SERPL-MCNC: 0.69 MG/DL (ref 0.6–1.3)
EOSINOPHIL # BLD AUTO: 0.16 THOUSAND/ΜL (ref 0–0.61)
EOSINOPHIL NFR BLD AUTO: 2 % (ref 0–6)
ERYTHROCYTE [DISTWIDTH] IN BLOOD BY AUTOMATED COUNT: 13.6 % (ref 11.6–15.1)
EST. AVERAGE GLUCOSE BLD GHB EST-MCNC: 146 MG/DL
GFR SERPL CREATININE-BSD FRML MDRD: 90 ML/MIN/1.73SQ M
GLUCOSE P FAST SERPL-MCNC: 141 MG/DL (ref 65–99)
HBA1C MFR BLD: 6.7 %
HCT VFR BLD AUTO: 44.7 % (ref 34.8–46.1)
HDLC SERPL-MCNC: 39 MG/DL
HGB BLD-MCNC: 15.1 G/DL (ref 11.5–15.4)
IMM GRANULOCYTES # BLD AUTO: 0.02 THOUSAND/UL (ref 0–0.2)
IMM GRANULOCYTES NFR BLD AUTO: 0 % (ref 0–2)
LDLC SERPL CALC-MCNC: 49 MG/DL (ref 0–100)
LYMPHOCYTES # BLD AUTO: 2.77 THOUSANDS/ΜL (ref 0.6–4.47)
LYMPHOCYTES NFR BLD AUTO: 37 % (ref 14–44)
MCH RBC QN AUTO: 29.4 PG (ref 26.8–34.3)
MCHC RBC AUTO-ENTMCNC: 33.8 G/DL (ref 31.4–37.4)
MCV RBC AUTO: 87 FL (ref 82–98)
MONOCYTES # BLD AUTO: 0.51 THOUSAND/ΜL (ref 0.17–1.22)
MONOCYTES NFR BLD AUTO: 7 % (ref 4–12)
NEUTROPHILS # BLD AUTO: 4.06 THOUSANDS/ΜL (ref 1.85–7.62)
NEUTS SEG NFR BLD AUTO: 53 % (ref 43–75)
NONHDLC SERPL-MCNC: 97 MG/DL
NRBC BLD AUTO-RTO: 0 /100 WBCS
PLATELET # BLD AUTO: 206 THOUSANDS/UL (ref 149–390)
PMV BLD AUTO: 12.5 FL (ref 8.9–12.7)
POTASSIUM SERPL-SCNC: 3.9 MMOL/L (ref 3.5–5.3)
PROT SERPL-MCNC: 7.5 G/DL (ref 6.4–8.2)
RBC # BLD AUTO: 5.13 MILLION/UL (ref 3.81–5.12)
SODIUM SERPL-SCNC: 133 MMOL/L (ref 136–145)
TRIGL SERPL-MCNC: 239 MG/DL
WBC # BLD AUTO: 7.58 THOUSAND/UL (ref 4.31–10.16)

## 2022-03-28 PROCEDURE — 83036 HEMOGLOBIN GLYCOSYLATED A1C: CPT

## 2022-03-28 PROCEDURE — 80053 COMPREHEN METABOLIC PANEL: CPT

## 2022-03-28 PROCEDURE — 85025 COMPLETE CBC W/AUTO DIFF WBC: CPT

## 2022-03-28 PROCEDURE — 36415 COLL VENOUS BLD VENIPUNCTURE: CPT

## 2022-03-28 PROCEDURE — 80061 LIPID PANEL: CPT

## 2022-04-11 ENCOUNTER — OFFICE VISIT (OUTPATIENT)
Dept: INTERNAL MEDICINE CLINIC | Facility: CLINIC | Age: 67
End: 2022-04-11
Payer: MEDICARE

## 2022-04-11 VITALS
WEIGHT: 256.2 LBS | DIASTOLIC BLOOD PRESSURE: 90 MMHG | HEIGHT: 69 IN | TEMPERATURE: 98.4 F | HEART RATE: 59 BPM | SYSTOLIC BLOOD PRESSURE: 140 MMHG | OXYGEN SATURATION: 96 % | BODY MASS INDEX: 37.95 KG/M2

## 2022-04-11 DIAGNOSIS — R73.01 IMPAIRED FASTING GLUCOSE: ICD-10-CM

## 2022-04-11 DIAGNOSIS — E66.01 OBESITY, MORBID (HCC): ICD-10-CM

## 2022-04-11 DIAGNOSIS — E78.2 MIXED HYPERLIPIDEMIA: ICD-10-CM

## 2022-04-11 DIAGNOSIS — I10 BENIGN ESSENTIAL HYPERTENSION: Primary | ICD-10-CM

## 2022-04-11 PROBLEM — M79.89 MASS OF SOFT TISSUE OF UPPER ARM: Status: RESOLVED | Noted: 2021-12-30 | Resolved: 2022-04-11

## 2022-04-11 PROBLEM — K11.8 PAROTID MASS: Status: RESOLVED | Noted: 2019-10-21 | Resolved: 2022-04-11

## 2022-04-11 PROCEDURE — 99214 OFFICE O/P EST MOD 30 MIN: CPT | Performed by: INTERNAL MEDICINE

## 2022-04-11 NOTE — PROGRESS NOTES
Assessment/Plan:      chronic problems are stable but she still needs to work on the weight  Sugar was creeping up  Ordered labs for next visit  No change in medications  Quality Measures:       Return in about 4 months (around 8/11/2022) for Recheck  No problem-specific Assessment & Plan notes found for this encounter  Diagnoses and all orders for this visit:    Benign essential hypertension  -     Comprehensive metabolic panel; Future  -     CBC and differential; Future  -     Lipid panel; Future    Impaired fasting glucose  -     Hemoglobin A1C; Future    Mixed hyperlipidemia    Obesity, morbid (HCC)          Subjective:      Patient ID: Sharyn Hallman is a 75 y o  female  Patient comes in today for routine follow-up  She states she is doing okay  Taking her medicines as directed  She just got back from [place] last night so her pressure was a little high  She got back late  Started exercising again  Trying to watch her diet  Sugar creeped up slightly  Denies any new complaints today  No further additions to her history        ALLERGIES:  Allergies   Allergen Reactions    Ciprofloxacin Other (See Comments)     Possible detached retina       CURRENT MEDICATIONS:    Current Outpatient Medications:     atenolol (TENORMIN) 50 mg tablet, TAKE 2 TABLETS BY MOUTH AT  BEDTIME, Disp: 180 tablet, Rfl: 3    cholecalciferol (VITAMIN D3) 1,000 units tablet, Take 1,000 Units by mouth daily at bedtime, Disp: , Rfl:     losartan-hydrochlorothiazide (HYZAAR) 100-25 MG per tablet, TAKE 1 TABLET BY MOUTH  DAILY, Disp: 90 tablet, Rfl: 3    multivitamin (THERAGRAN) TABS, Take 1 tablet by mouth daily, Disp: , Rfl:     prednisoLONE acetate (PRED FORTE) 1 % ophthalmic suspension, daily  , Disp: , Rfl: 1    simvastatin (ZOCOR) 40 mg tablet, TAKE 1 TABLET BY MOUTH  DAILY AT BEDTIME, Disp: 90 tablet, Rfl: 3    vitamin B-12 (VITAMIN B-12) 1,000 mcg tablet, Take 1,000 mcg by mouth daily, Disp: , Rfl: ACTIVE PROBLEM LIST:  Patient Active Problem List   Diagnosis    Arthritis    Benign essential hypertension    Detached retina, left    Mixed hyperlipidemia    Impaired fasting glucose    Varicose veins of lower extremity    Thyroid nodule    Parotid neoplasm    Obesity, morbid ([address] )       PAST MEDICAL HISTORY:  Past Medical History:   Diagnosis Date    Cataract     07/16/2017    Detached retina     left    Hyperlipidemia     Hypertension     Mass of arm     left arm        PAST SURGICAL HISTORY:  Past Surgical History:   Procedure Laterality Date    CARPAL TUNNEL RELEASE Left     CATARACT EXTRACTION Left     COLONOSCOPY      EYE SURGERY      MASS EXCISION Left 1/24/2022    Procedure: EXCISION BIOPSY TISSUE LESION/MASS UPPER EXTREMITY;  Surgeon: Chula Claudio DO;  [facility];  Service: General    LA COLONOSCOPY FLX DX W/COLLJ SPEC WHEN PFRMD N/A 8/29/2017    Procedure: COLONOSCOPY;  Surgeon: Raulito Brush MD;  [facility];   Service: Gastroenterology    RETINAL DETACHMENT SURGERY Left        FAMILY HISTORY:  Family History   Problem Relation Age of Onset    Coronary artery disease Mother     Diabetes Mother     COPD Father     Coronary artery disease Brother     Valvular heart disease Brother     Cancer Daughter        SOCIAL HISTORY:  Social History     Socioeconomic History    Marital status:      Spouse name: Not on file    Number of children: 2    Years of education: Not on file    Highest education level: Not on file   Occupational History    Occupation:       Comment: retired    Tobacco Use    Smoking status: Never Smoker    Smokeless tobacco: Never Used   Vaping Use    Vaping Use: Never used   Substance and Sexual Activity    Alcohol use: Yes     Comment: RARELY,    Drug use: No    Sexual activity: Yes     Partners: Male   Other Topics Concern    Not on file   Social History Narrative    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    Engages in instrumental music     Exercise: Yard work     Exercises reg,      Social Determinants of Health     Financial Resource Strain: Not on [employer] Insecurity: Not on file   Transportation Needs: Not on file   Physical Activity: Not on file   Stress: Not on file   Social Connections: Not on file   Intimate Partner Violence: Not on file   Housing Stability: Not on file       Review of Systems   Respiratory: Negative for shortness of breath  Cardiovascular: Negative for chest pain  Gastrointestinal: Negative for abdominal pain  Objective:  Vitals:    04/11/22 0946 04/11/22 1003   BP: 142/96 140/90   BP Location: Left arm    Patient Position: Sitting    Cuff Size: Large    Pulse: 59    Temp: 98 4 °F (36 9 °C)    TempSrc: Tympanic    SpO2: 96%    Weight: 116 kg (256 lb 3 2 oz)    Height: 5' 9" (1 753 m)      Body mass index is 37 83 kg/m²  Physical Exam  Vitals and nursing note reviewed  Constitutional:       Appearance: She is well-developed  Cardiovascular:      Rate and Rhythm: Normal rate and regular rhythm  Heart sounds: Normal heart sounds  Pulmonary:      Effort: Pulmonary effort is normal       Breath sounds: Normal breath sounds  Abdominal:      Palpations: Abdomen is soft  Tenderness: There is no abdominal tenderness  Neurological:      Mental Status: She is alert and oriented to person, place, and time             RESULTS:    Recent Results (from the past 1008 hour(s))   Comprehensive metabolic panel    Collection Time: 03/28/22  9:13 AM   Result Value Ref Range    Sodium 133 (L) 136 - 145 mmol/L    Potassium 3 9 3 5 - 5 3 mmol/L    Chloride 100 100 - 108 mmol/L    CO2 27 21 - 32 mmol/L    ANION GAP 6 4 - 13 mmol/L    BUN 16 5 - 25 mg/dL    Creatinine 0 69 0 60 - 1 30 mg/dL    Glucose, Fasting 141 (H) 65 - 99 mg/dL    Calcium 9 5 8 3 - 10 1 mg/dL    AST 23 5 - 45 U/L    ALT 34 12 - 78 U/L    Alkaline Phosphatase 66 46 - 116 U/L    Total Protein 7 5 6 4 - 8 2 g/dL    Albumin 4 2 3 5 - 5 0 g/dL    Total Bilirubin 1 81 (H) 0 20 - 1 00 mg/dL    eGFR 90 ml/min/1 73sq m   CBC and differential    Collection Time: 03/28/22  9:13 AM   Result Value Ref Range    WBC 7 58 4 31 - 10 16 Thousand/uL    RBC 5 13 (H) 3 81 - 5 12 Million/uL    Hemoglobin 15 1 11 5 - 15 4 g/dL    Hematocrit 44 7 34 8 - 46 1 %    MCV 87 82 - 98 fL    MCH 29 4 26 8 - 34 3 pg    MCHC 33 8 31 4 - 37 4 g/dL    RDW 13 6 11 6 - 15 1 %    MPV 12 5 8 9 - 12 7 fL    Platelets 511 311 - 147 Thousands/uL    nRBC 0 /100 WBCs    Neutrophils Relative 53 43 - 75 %    Immat GRANS % 0 0 - 2 %    Lymphocytes Relative 37 14 - 44 %    Monocytes Relative 7 4 - 12 %    Eosinophils Relative 2 0 - 6 %    Basophils Relative 1 0 - 1 %    Neutrophils Absolute 4 06 1 85 - 7 62 Thousands/µL    Immature Grans Absolute 0 02 0 00 - 0 20 Thousand/uL    Lymphocytes Absolute 2 77 0 60 - 4 47 Thousands/µL    Monocytes Absolute 0 51 0 17 - 1 22 Thousand/µL    Eosinophils Absolute 0 16 0 00 - 0 61 Thousand/µL    Basophils Absolute 0 06 0 00 - 0 10 Thousands/µL   Hemoglobin A1C    Collection Time: 03/28/22  9:13 AM   Result Value Ref Range    Hemoglobin A1C 6 7 (H) Normal 3 8-5 6%; PreDiabetic 5 7-6 4%; Diabetic >=6 5%; Glycemic control for adults with diabetes <7 0% %     mg/dl   Lipid panel    Collection Time: 03/28/22  9:13 AM   Result Value Ref Range    Cholesterol 136 See Comment mg/dL    Triglycerides 239 (H) See Comment mg/dL    HDL, Direct 39 (L) >=50 mg/dL    LDL Calculated 49 0 - 100 mg/dL    Non-HDL-Chol (CHOL-HDL) 97 mg/dl       This note was created with voice recognition software  Phonic, grammatical and spelling errors may be present within the note as a result

## 2022-05-01 DIAGNOSIS — I10 BENIGN ESSENTIAL HYPERTENSION: ICD-10-CM

## 2022-05-01 DIAGNOSIS — E78.2 MIXED HYPERLIPIDEMIA: ICD-10-CM

## 2022-05-02 RX ORDER — SIMVASTATIN 40 MG
TABLET ORAL
Qty: 90 TABLET | Refills: 3 | Status: SHIPPED | OUTPATIENT
Start: 2022-05-02

## 2022-05-02 RX ORDER — ATENOLOL 50 MG/1
TABLET ORAL
Qty: 180 TABLET | Refills: 3 | Status: SHIPPED | OUTPATIENT
Start: 2022-05-02

## 2022-05-02 RX ORDER — LOSARTAN POTASSIUM AND HYDROCHLOROTHIAZIDE 25; 100 MG/1; MG/1
1 TABLET ORAL DAILY
Qty: 90 TABLET | Refills: 3 | Status: SHIPPED | OUTPATIENT
Start: 2022-05-02

## 2022-05-03 ENCOUNTER — TELEPHONE (OUTPATIENT)
Dept: ADMINISTRATIVE | Facility: OTHER | Age: 67
End: 2022-05-03

## 2022-05-03 NOTE — TELEPHONE ENCOUNTER
----- Message from Shraddha Lancaster sent at 5/2/2022  3:13 PM EDT -----  Regarding: colon  05/02/22 3:13 PM    Hello, our patient attached above has had CRC: Colonoscopy completed/performed  Please assist in updating the patient chart by pulling the document from encounters  Tab within Chart Review  The date of service is 8/29/17       Thank you,  Dasia Brambila MA  [facility]

## 2022-05-03 NOTE — TELEPHONE ENCOUNTER
Please submit an IT ticket to review this Lumen's issue further  Your request will now be closed  Contact the [email or web address] with any further questions  Thank you         Any additional questions or concerns should be emailed to the Practice Liaisons via [email or web address] email, please do not reply via In Basket      Thank you  Janis Sweet MA

## 2022-06-20 ENCOUNTER — OFFICE VISIT (OUTPATIENT)
Dept: INTERNAL MEDICINE CLINIC | Facility: CLINIC | Age: 67
End: 2022-06-20
Payer: MEDICARE

## 2022-06-20 VITALS
TEMPERATURE: 98.4 F | HEIGHT: 69 IN | DIASTOLIC BLOOD PRESSURE: 84 MMHG | HEART RATE: 72 BPM | BODY MASS INDEX: 37.92 KG/M2 | SYSTOLIC BLOOD PRESSURE: 134 MMHG | OXYGEN SATURATION: 99 % | WEIGHT: 256 LBS | RESPIRATION RATE: 16 BRPM

## 2022-06-20 DIAGNOSIS — J02.9 PHARYNGITIS, UNSPECIFIED ETIOLOGY: Primary | ICD-10-CM

## 2022-06-20 PROCEDURE — 99213 OFFICE O/P EST LOW 20 MIN: CPT | Performed by: INTERNAL MEDICINE

## 2022-06-20 RX ORDER — AMOXICILLIN AND CLAVULANATE POTASSIUM 875; 125 MG/1; MG/1
1 TABLET, FILM COATED ORAL EVERY 12 HOURS SCHEDULED
Qty: 20 TABLET | Refills: 0 | Status: SHIPPED | OUTPATIENT
Start: 2022-06-20 | End: 2022-06-20 | Stop reason: SDUPTHER

## 2022-06-20 RX ORDER — AMOXICILLIN AND CLAVULANATE POTASSIUM 875; 125 MG/1; MG/1
1 TABLET, FILM COATED ORAL EVERY 12 HOURS SCHEDULED
Qty: 20 TABLET | Refills: 0 | Status: SHIPPED | OUTPATIENT
Start: 2022-06-20 | End: 2022-06-30

## 2022-06-20 NOTE — PROGRESS NOTES
Assessment/Plan:     Molly Salazar is here with sick complaints; sore throat x 2 weeks; recently seen at urgent care and strep and covid negative  Was around her sick grandson  Given length of symptoms, will tx with ABT  Quality Measures:       Return for Next scheduled follow up  No problem-specific Assessment & Plan notes found for this encounter  Diagnoses and all orders for this visit:    Pharyngitis, unspecified etiology  -     amoxicillin-clavulanate (Augmentin) 875-125 mg per tablet; Take 1 tablet by mouth every 12 (twelve) hours for 10 days          Subjective:      Patient ID: Christie Villa is a 75 y o  female  Patient is here with sick complaints        ALLERGIES:  Allergies   Allergen Reactions    Ciprofloxacin Other (See Comments)     Possible detached retina       CURRENT MEDICATIONS:    Current Outpatient Medications:     amoxicillin-clavulanate (Augmentin) 875-125 mg per tablet, Take 1 tablet by mouth every 12 (twelve) hours for 10 days, Disp: 20 tablet, Rfl: 0    atenolol (TENORMIN) 50 mg tablet, TAKE 2 TABLETS BY MOUTH AT  BEDTIME, Disp: 180 tablet, Rfl: 3    cholecalciferol (VITAMIN D3) 1,000 units tablet, Take 1,000 Units by mouth daily at bedtime, Disp: , Rfl:     losartan-hydrochlorothiazide (HYZAAR) 100-25 MG per tablet, TAKE 1 TABLET BY MOUTH  DAILY, Disp: 90 tablet, Rfl: 3    multivitamin (THERAGRAN) TABS, Take 1 tablet by mouth daily, Disp: , Rfl:     prednisoLONE acetate (PRED FORTE) 1 % ophthalmic suspension, daily, Disp: , Rfl: 1    simvastatin (ZOCOR) 40 mg tablet, TAKE 1 TABLET BY MOUTH  DAILY AT BEDTIME, Disp: 90 tablet, Rfl: 3    vitamin B-12 (VITAMIN B-12) 1,000 mcg tablet, Take 1,000 mcg by mouth daily, Disp: , Rfl:     ACTIVE PROBLEM LIST:  Patient Active Problem List   Diagnosis    Arthritis    Benign essential hypertension    Detached retina, left    Mixed hyperlipidemia    Impaired fasting glucose    Varicose veins of lower extremity    Thyroid nodule  Parotid neoplasm    Obesity, morbid ([facility] [address] )       PAST MEDICAL HISTORY:  Past Medical History:   Diagnosis Date    Cataract     07/16/2017    Detached retina     left    Hyperlipidemia     Hypertension     Mass of arm     left arm        PAST SURGICAL HISTORY:  Past Surgical History:   Procedure Laterality Date    CARPAL TUNNEL RELEASE Left     CATARACT EXTRACTION Left     COLONOSCOPY      EYE SURGERY      MASS EXCISION Left 1/24/2022    Procedure: EXCISION BIOPSY TISSUE LESION/MASS UPPER EXTREMITY;  Surgeon: Trupti Guerra DO;  [facility];  Service: General    [place] COLONOSCOPY FLX DX W/COLLJ SPEC WHEN PFRMD N/A 8/29/2017    Procedure: COLONOSCOPY;  Surgeon: Contreras Valenzuela MD;  [facility]; Service: Gastroenterology    RETINAL DETACHMENT SURGERY Left        FAMILY HISTORY:  Family History   Problem Relation Age of Onset    Coronary artery disease Mother     Diabetes Mother     COPD Father     Coronary artery disease Brother     Valvular heart disease Brother     Cancer Daughter        SOCIAL HISTORY:  Social History     Socioeconomic History    Marital status:      Spouse name: Not on file    Number of children: 2    Years of education: Not on file    Highest education level: Not on file   Occupational History    Occupation:       Comment: retired    Tobacco Use    Smoking status: Never Smoker    Smokeless tobacco: Never Used   Vaping Use    Vaping Use: Never used   Substance and Sexual Activity    Alcohol use: Yes     Comment: RARELY,    Drug use: No    Sexual activity: Yes     Partners: Male   Other Topics Concern    Not on file   Social History Narrative    Brushes teeth twice a day    Dental care    Engages in instrumental music     Exercise:   Yard work     Exercises reg,      Social Determinants of Health     Financial Resource Strain: Not on [employer] Insecurity: Not on file   Transportation Needs: Not on file   Physical Activity: Not on file   Stress: Not on file   Social Connections: Not on file   Intimate Partner Violence: Not on file   Housing Stability: Not on file       Review of Systems   HENT: Positive for congestion, ear pain, postnasal drip, sore throat and voice change  All other systems reviewed and are negative  Objective:  Vitals:    06/20/22 0920   BP: 134/84   BP Location: Left arm   Patient Position: Sitting   Cuff Size: Large   Pulse: 72   Resp: 16   Temp: 98 4 °F (36 9 °C)   TempSrc: Tympanic   SpO2: 99%   Weight: 116 kg (256 lb)   Height: 5' 9" (1 753 m)     Body mass index is 37 8 kg/m²  Physical Exam  Vitals and nursing note reviewed  Constitutional:       Appearance: She is well-developed  HENT:      Head: Normocephalic and atraumatic  Left Ear: Tympanic membrane normal       Mouth/Throat: Tonsils: No tonsillar exudate  2+ on the right  2+ on the left  Cardiovascular:      Rate and Rhythm: Normal rate and regular rhythm  Pulmonary:      Effort: Pulmonary effort is normal       Breath sounds: Normal breath sounds  Abdominal:      Palpations: Abdomen is soft  Skin:     General: Skin is warm and dry  Neurological:      Mental Status: She is alert and oriented to person, place, and time  RESULTS:    Recent Results (from the past 1008 hour(s))   NOVEL CORONAVIRUS (COVID-19), PCR [facility]    Collection Time: 06/06/22 12:00 AM    Specimen type and source: Separate Accession, Nasogastric aspirate (specimen)   Result Value Ref Range    SARS Coronavirus 2 PCR Not Detected Not Detected    Specimen Description Nasopharyngeal swab     First test? No     Prior test type? Unknown     Prior test result? Not Detected     Prior test date?       ^^^UNKNA^Unknown or not applicable^L^^^Unknown or not applicable    Employed in healthcare setting? No     Symptomatic as defined by CDC? Yes     Date of symptom onset? 20,220,604     Currently hospitalized? No     In ICU?  Unknown     Resident in [facility] care setting? No     Pregnant? Not applicable        This note was created with voice recognition software  Phonic, grammatical and spelling errors may be present within the note as a result

## 2022-06-30 ENCOUNTER — TELEPHONE (OUTPATIENT)
Dept: GASTROENTEROLOGY | Facility: CLINIC | Age: 67
End: 2022-06-30

## 2022-08-08 ENCOUNTER — APPOINTMENT (OUTPATIENT)
Dept: LAB | Facility: CLINIC | Age: 67
End: 2022-08-08
Payer: MEDICARE

## 2022-08-08 DIAGNOSIS — I10 BENIGN ESSENTIAL HYPERTENSION: ICD-10-CM

## 2022-08-08 DIAGNOSIS — R73.01 IMPAIRED FASTING GLUCOSE: ICD-10-CM

## 2022-08-08 LAB
ALBUMIN SERPL BCP-MCNC: 3.9 G/DL (ref 3.5–5)
ALP SERPL-CCNC: 71 U/L (ref 46–116)
ALT SERPL W P-5'-P-CCNC: 28 U/L (ref 12–78)
ANION GAP SERPL CALCULATED.3IONS-SCNC: 4 MMOL/L (ref 4–13)
AST SERPL W P-5'-P-CCNC: 19 U/L (ref 5–45)
BASOPHILS # BLD AUTO: 0.04 THOUSANDS/ΜL (ref 0–0.1)
BASOPHILS NFR BLD AUTO: 1 % (ref 0–1)
BILIRUB SERPL-MCNC: 1.91 MG/DL (ref 0.2–1)
BUN SERPL-MCNC: 16 MG/DL (ref 5–25)
CALCIUM SERPL-MCNC: 9.5 MG/DL (ref 8.3–10.1)
CHLORIDE SERPL-SCNC: 102 MMOL/L (ref 96–108)
CHOLEST SERPL-MCNC: 144 MG/DL
CO2 SERPL-SCNC: 30 MMOL/L (ref 21–32)
CREAT SERPL-MCNC: 0.71 MG/DL (ref 0.6–1.3)
EOSINOPHIL # BLD AUTO: 0.11 THOUSAND/ΜL (ref 0–0.61)
EOSINOPHIL NFR BLD AUTO: 1 % (ref 0–6)
ERYTHROCYTE [DISTWIDTH] IN BLOOD BY AUTOMATED COUNT: 13.6 % (ref 11.6–15.1)
EST. AVERAGE GLUCOSE BLD GHB EST-MCNC: 146 MG/DL
GFR SERPL CREATININE-BSD FRML MDRD: 87 ML/MIN/1.73SQ M
GLUCOSE P FAST SERPL-MCNC: 140 MG/DL (ref 65–99)
HBA1C MFR BLD: 6.7 %
HCT VFR BLD AUTO: 43.8 % (ref 34.8–46.1)
HDLC SERPL-MCNC: 37 MG/DL
HGB BLD-MCNC: 14.7 G/DL (ref 11.5–15.4)
IMM GRANULOCYTES # BLD AUTO: 0.03 THOUSAND/UL (ref 0–0.2)
IMM GRANULOCYTES NFR BLD AUTO: 0 % (ref 0–2)
LDLC SERPL CALC-MCNC: 48 MG/DL (ref 0–100)
LYMPHOCYTES # BLD AUTO: 2.6 THOUSANDS/ΜL (ref 0.6–4.47)
LYMPHOCYTES NFR BLD AUTO: 34 % (ref 14–44)
MCH RBC QN AUTO: 29.8 PG (ref 26.8–34.3)
MCHC RBC AUTO-ENTMCNC: 33.6 G/DL (ref 31.4–37.4)
MCV RBC AUTO: 89 FL (ref 82–98)
MONOCYTES # BLD AUTO: 0.47 THOUSAND/ΜL (ref 0.17–1.22)
MONOCYTES NFR BLD AUTO: 6 % (ref 4–12)
NEUTROPHILS # BLD AUTO: 4.51 THOUSANDS/ΜL (ref 1.85–7.62)
NEUTS SEG NFR BLD AUTO: 58 % (ref 43–75)
NONHDLC SERPL-MCNC: 107 MG/DL
NRBC BLD AUTO-RTO: 0 /100 WBCS
PLATELET # BLD AUTO: 186 THOUSANDS/UL (ref 149–390)
PMV BLD AUTO: 12.2 FL (ref 8.9–12.7)
POTASSIUM SERPL-SCNC: 3.7 MMOL/L (ref 3.5–5.3)
PROT SERPL-MCNC: 7.6 G/DL (ref 6.4–8.4)
RBC # BLD AUTO: 4.94 MILLION/UL (ref 3.81–5.12)
SODIUM SERPL-SCNC: 136 MMOL/L (ref 135–147)
TRIGL SERPL-MCNC: 293 MG/DL
WBC # BLD AUTO: 7.76 THOUSAND/UL (ref 4.31–10.16)

## 2022-08-08 PROCEDURE — 80061 LIPID PANEL: CPT

## 2022-08-08 PROCEDURE — 36415 COLL VENOUS BLD VENIPUNCTURE: CPT

## 2022-08-08 PROCEDURE — 85025 COMPLETE CBC W/AUTO DIFF WBC: CPT

## 2022-08-08 PROCEDURE — 80053 COMPREHEN METABOLIC PANEL: CPT

## 2022-08-08 PROCEDURE — 83036 HEMOGLOBIN GLYCOSYLATED A1C: CPT

## 2022-08-09 ENCOUNTER — PREP FOR PROCEDURE (OUTPATIENT)
Dept: GASTROENTEROLOGY | Facility: CLINIC | Age: 67
End: 2022-08-09

## 2022-08-09 DIAGNOSIS — Z12.11 SCREENING FOR COLON CANCER: Primary | ICD-10-CM

## 2022-08-09 NOTE — TELEPHONE ENCOUNTER
08/09/22  Screened by: Jung Wooten    Referring Provider Troy    Pre- Screening: There is no height or weight on file to calculate BMI  Has patient been referred for a routine screening Colonoscopy? yes  Is the patient between 39-70 years old? yes      Previous Colonoscopy yes   If yes:    Date: 8/2017    Facility: [facility]    Reason:       SCHEDULING STAFF: If the patient is between 45yrs-49yrs, please advise patient to confirm benefits/coverage with their insurance company for a routine screening colonoscopy, some insurance carriers will only cover at [phone] or older  If the patient is over 66years old, please schedule an office visit  Does the patient want to see a Gastroenterologist prior to their procedure OR are they having any GI symptoms? no    Has the patient been hospitalized or had abdominal surgery in the past 6 months? no    Does the patient use supplemental oxygen? no    Does the patient take Coumadin, Lovenox, Plavix, Elliquis, Xarelto, or other blood thinning medication? no    Has the patient had a stroke, cardiac event, or stent placed in the past year? no    SCHEDULING STAFF: If patient answers NO to above questions, then schedule procedure  If patient answers YES to above questions, then schedule office appointment  If patient is between 45yrs - 49yrs, please advise patient that we will have to confirm benefits & coverage with their insurance company for a routine screening colonoscopy

## 2022-08-10 ENCOUNTER — RA CDI HCC (OUTPATIENT)
Dept: OTHER | Facility: HOSPITAL | Age: 67
End: 2022-08-10

## 2022-08-10 NOTE — PROGRESS NOTES
[address]  coding opportunities       Chart reviewed, no opportunity found: CHART REVIEWED, NO OPPORTUNITY FOUND        Patients Insurance     Medicare Insurance: Medicare

## 2022-08-16 ENCOUNTER — OFFICE VISIT (OUTPATIENT)
Dept: INTERNAL MEDICINE CLINIC | Facility: CLINIC | Age: 67
End: 2022-08-16
Payer: MEDICARE

## 2022-08-16 VITALS
SYSTOLIC BLOOD PRESSURE: 140 MMHG | RESPIRATION RATE: 16 BRPM | BODY MASS INDEX: 37.89 KG/M2 | DIASTOLIC BLOOD PRESSURE: 80 MMHG | HEIGHT: 69 IN | OXYGEN SATURATION: 96 % | TEMPERATURE: 98.7 F | HEART RATE: 75 BPM | WEIGHT: 255.8 LBS

## 2022-08-16 DIAGNOSIS — Z13.31 DEPRESSION SCREENING: ICD-10-CM

## 2022-08-16 DIAGNOSIS — Z78.0 POSTMENOPAUSAL: ICD-10-CM

## 2022-08-16 DIAGNOSIS — Z00.00 MEDICARE ANNUAL WELLNESS VISIT, SUBSEQUENT: Primary | ICD-10-CM

## 2022-08-16 DIAGNOSIS — R73.01 IMPAIRED FASTING GLUCOSE: ICD-10-CM

## 2022-08-16 DIAGNOSIS — E78.2 MIXED HYPERLIPIDEMIA: ICD-10-CM

## 2022-08-16 DIAGNOSIS — I10 BENIGN ESSENTIAL HYPERTENSION: ICD-10-CM

## 2022-08-16 PROCEDURE — G0438 PPPS, INITIAL VISIT: HCPCS | Performed by: INTERNAL MEDICINE

## 2022-08-16 PROCEDURE — 99214 OFFICE O/P EST MOD 30 MIN: CPT | Performed by: INTERNAL MEDICINE

## 2022-08-16 NOTE — PROGRESS NOTES
Assessment and Plan:     Problem List Items Addressed This Visit        Endocrine    Impaired fasting glucose    Relevant Orders    Hemoglobin A1C       Cardiovascular and Mediastinum    Benign essential hypertension    Relevant Orders    Comprehensive metabolic panel    CBC and differential       Other    Mixed hyperlipidemia    Relevant Orders    Lipid panel      Other Visit Diagnoses     Medicare annual wellness visit, subsequent    -  Primary    Depression screening        Postmenopausal        Relevant Orders    DXA bone density spine hip and pelvis        BMI Counseling: Body mass index is 37 78 kg/m²  The BMI is above normal  Nutrition recommendations include encouraging healthy choices of fruits and vegetables  Rationale for BMI follow-up plan is due to patient being overweight or obese  Depression Screening and Follow-up Plan: Patient was screened for depression during today's encounter  They screened negative with a PHQ-2 score of 0  Sugar remains a problem  She wants to try harder with the diet  I told her if the sugar stays in this range, she should be considered a diabetic  Continue other medications  She wants to think about whether she would like to get referred to a sleep specialist   She will contact us if she wants us to place that referral     Preventive health issues were discussed with patient, and age appropriate screening tests were ordered as noted in patient's After Visit Summary  Personalized health advice and appropriate referrals for health education or preventive services given if needed, as noted in patient's After Visit Summary  History of Present Illness:     Patient presents for a Medicare Wellness Visit      Patient comes in today for routine follow-up and Medicare wellness  She states she is doing okay  Sugars still running a little high but she wants to keep working on it  Her cholesterol is okay  Blood pressure is okay  Taking her medicines as directed  Reports her family was concerned that she may have sleep apnea  Mainly based on her weight  She lives alone so no one has witnessed her snoring  She feels she does not snore  She will take a short nap during the day when her grandson, a toddler, takes a nap  Patient Care Team:  Arabella Richard MD as PCP - Angelita Villatoro MD as Endoscopist     Review of Systems:     Review of Systems   Respiratory: Negative for shortness of breath  Cardiovascular: Negative for chest pain  Gastrointestinal: Negative for abdominal pain  Problem List:     Patient Active Problem List   Diagnosis    Arthritis    Benign essential hypertension    Detached retina, left    Mixed hyperlipidemia    Impaired fasting glucose    Varicose veins of lower extremity    Thyroid nodule    Parotid neoplasm    Obesity, morbid ([address] )      Past Medical and Surgical History:     Past Medical History:   Diagnosis Date    Cataract     07/16/2017    Detached retina     left    Hyperlipidemia     Hypertension     Mass of arm     left arm      Past Surgical History:   Procedure Laterality Date    CARPAL TUNNEL RELEASE Left     CATARACT EXTRACTION Left     COLONOSCOPY      EYE SURGERY      MASS EXCISION Left 1/24/2022    Procedure: EXCISION BIOPSY TISSUE LESION/MASS UPPER EXTREMITY;  Surgeon: Dee Sesay DO;  Location: [facility];  Service: General    NV COLONOSCOPY FLX DX W/COLLJ SPEC WHEN PFRMD N/A 8/29/2017    Procedure: COLONOSCOPY;  Surgeon: Zi Lott MD;  Location: [facility];   Service: Gastroenterology    RETINAL DETACHMENT SURGERY Left       Family History:     Family History   Problem Relation Age of Onset    Coronary artery disease Mother     Diabetes Mother     COPD Father     Coronary artery disease Brother     Valvular heart disease Brother     Cancer Daughter       Social History:     Social History     Socioeconomic History    Marital status:      Spouse name: None    Number of children: 2    Years of education: None    Highest education level: None   Occupational History    Occupation:       Comment: retired    Tobacco Use    Smoking status: Never Smoker    Smokeless tobacco: Never Used   Vaping Use    Vaping Use: Never used   Substance and Sexual Activity    Alcohol use: Yes     Comment: RARELY,    Drug use: No    Sexual activity: Yes     Partners: Male   Other Topics Concern    None   Social History Narrative    Brushes teeth twice a day    Dental care    Engages in instrumental music     Exercise: Yard work     Exercises reg,      Social Determinants of Health     Financial Resource Strain: Low Risk     Difficulty of Paying Living Expenses: Not hard at all   Food Insecurity: Not on file   Transportation Needs: No Transportation Needs    Lack of Transportation (Medical): No    Lack of Transportation (Non-Medical): No   Physical Activity: Not on file   Stress: Not on file   Social Connections: Not on file   Intimate Partner Violence: Not on file   Housing Stability: Not on file      Medications and Allergies:     Current Outpatient Medications   Medication Sig Dispense Refill    Ascorbic Acid (VITAMIN C PO) Take by mouth      atenolol (TENORMIN) 50 mg tablet TAKE 2 TABLETS BY MOUTH AT  BEDTIME 180 tablet 3    cholecalciferol (VITAMIN D3) 1,000 units tablet Take 1,000 Units by mouth daily at bedtime      losartan-hydrochlorothiazide (HYZAAR) 100-25 MG per tablet TAKE 1 TABLET BY MOUTH  DAILY 90 tablet 3    multivitamin (THERAGRAN) TABS Take 1 tablet by mouth daily      prednisoLONE acetate (PRED FORTE) 1 % ophthalmic suspension daily  1    simvastatin (ZOCOR) 40 mg tablet TAKE 1 TABLET BY MOUTH  DAILY AT BEDTIME 90 tablet 3    vitamin B-12 (VITAMIN B-12) 1,000 mcg tablet Take 1,000 mcg by mouth daily       No current facility-administered medications for this visit       Allergies   Allergen Reactions    Ciprofloxacin Other (See Comments) Possible detached retina  Possible detached retina      Immunizations:     Immunization History   Administered Date(s) Administered    COVID-19 MODERNA VACC 0 5 ML IM 01/27/2021, 02/24/2021, 10/26/2021    INFLUENZA 11/11/2010, 10/31/2011, 01/16/2014    Influenza Quadrivalent, 6-35 Months IM 10/30/2017    Influenza Split High Dose Preservative Free IM 12/06/2016    Influenza, high dose seasonal 0 7 mL 09/23/2019, 10/08/2020    Influenza, recombinant, quadrivalent,injectable, preservative free 09/20/2018    MMR 05/09/2019    Pneumococcal Conjugate 13-Valent 09/23/2019    Pneumococcal Polysaccharide PPV23 11/11/2010, 10/08/2020, 12/10/2021    Tdap 12/06/2016    Zoster Vaccine Recombinant 11/24/2020      Health Maintenance:         Topic Date Due    Colorectal Cancer Screening  08/29/2022    Breast Cancer Screening: Mammogram  09/03/2022    Hepatitis C Screening  Completed         Topic Date Due    COVID-19 Vaccine (4 - Booster for Moderna series) 02/26/2022    Influenza Vaccine (1) 09/01/2022      Medicare Screening Tests and Risk Assessments:     Deamodesto George is here for her Subsequent Wellness visit  Health Risk Assessment:   Patient rates overall health as good  Patient feels that their physical health rating is slightly better  Patient is satisfied with their life  Eyesight was rated as same  Hearing was rated as same  Patient feels that their emotional and mental health rating is same  Patients states they are never, rarely angry  Patient states they are never, rarely unusually tired/fatigued  Pain experienced in the last 7 days has been some  Patient's pain rating has been 1/10  Patient states that she has experienced no weight loss or gain in last 6 months  I have lost 5 lbs but I monitor my eating habits    Depression Screening:   PHQ-2 Score: 0      Fall Risk Screening:    In the past year, patient has experienced: no history of falling in past year      Urinary Incontinence Screening:   Patient has not leaked urine accidently in the last six months  Home Safety:  Patient has trouble with stairs inside or outside of their home  Patient has working smoke alarms and has working carbon monoxide detector  Home safety hazards include: none  Nutrition:   Current diet is Low Saturated Fat  Medications:   Patient is currently taking over-the-counter supplements  OTC medications include: Daily vitamin, D3, B12, C  Patient is able to manage medications  Activities of Daily Living (ADLs)/Instrumental Activities of Daily Living (IADLs):   Walk and transfer into and out of bed and chair?: Yes  Dress and groom yourself?: Yes    Bathe or shower yourself?: Yes    Feed yourself? Yes  Do your laundry/housekeeping?: Yes  Manage your money, pay your bills and track your expenses?: Yes  Make your own meals?: Yes    Do your own shopping?: Yes    Previous Hospitalizations:   Any hospitalizations or ED visits within the last 12 months?: No      Advance Care Planning:   Living will: Yes    Durable POA for healthcare:  Yes    Advanced directive: Yes    Advanced directive counseling given: Yes      Cognitive Screening:   Provider or family/friend/caregiver concerned regarding cognition?: No    PREVENTIVE SCREENINGS      Cardiovascular Screening:    General: Screening Not Indicated and History Lipid Disorder      Diabetes Screening:     General: Screening Current      Colorectal Cancer Screening:     General: Screening Current      Breast Cancer Screening:     General: Screening Current      Cervical Cancer Screening:    General: Screening Not Indicated      Osteoporosis Screening:      Due for: Bone Density Ultrasound      Abdominal Aortic Aneurysm (AAA) Screening:        General: Screening Not Indicated      Lung Cancer Screening:     General: Screening Not Indicated      Hepatitis C Screening:    General: Screening Current    Screening, Brief Intervention, and Referral to Treatment (SBIRT)    Screening  Typical number of drinks in a day: 0  Typical number of drinks in a week: 0  Interpretation: Low risk drinking behavior  AUDIT-C Screenin) How often did you have a drink containing alcohol in the past year? never  2) How many drinks did you have on a typical day when you were drinking in the past year? 0  3) How often did you have 6 or more drinks on one occasion in the past year? never    AUDIT-C Score: 0  Interpretation: Score 0-2 (female): Negative screen for alcohol misuse    Single Item Drug Screening:  How often have you used an illegal drug (including marijuana) or a prescription medication for non-medical reasons in the past year? never    Single Item Drug Screen Score: 0  Interpretation: Negative screen for possible drug use disorder    Brief Intervention  Alcohol & drug use screenings were reviewed  No concerns regarding substance use disorder identified  No exam data present     Physical Exam:     /80 (BP Location: Left arm, Patient Position: Sitting, Cuff Size: Adult)   Pulse 75   Temp 98 7 °F (37 1 °C) (Tympanic)   Resp 16   Ht 5' 9" (1 753 m)   Wt 116 kg (255 lb 12 8 oz)   SpO2 96%   BMI 37 78 kg/m²     Physical Exam  Vitals and nursing note reviewed  Constitutional:       Appearance: She is well-developed  Cardiovascular:      Rate and Rhythm: Normal rate and regular rhythm  Pulmonary:      Effort: Pulmonary effort is normal       Breath sounds: Normal breath sounds  Abdominal:      General: Abdomen is flat  Tenderness: There is no abdominal tenderness  Musculoskeletal:      Right lower leg: No edema  Left lower leg: No edema  Neurological:      Mental Status: She is alert and oriented to person, place, and time  Psychiatric:         Behavior: Behavior normal          Thought Content:  Thought content normal          Judgment: Judgment normal           Nickolas Ramirez MD

## 2022-08-16 NOTE — PATIENT INSTRUCTIONS
Medicare Preventive Visit Patient Instructions  Thank you for completing your Welcome to Medicare Visit or Medicare Annual Wellness Visit today  Your next wellness visit will be due in one year (8/17/2023)  The screening/preventive services that you may require over the next 5-10 years are detailed below  Some tests may not apply to you based off risk factors and/or age  Screening tests ordered at today's visit but not completed yet may show as past due  Also, please note that scanned in results may not display below  Preventive Screenings:  Service Recommendations Previous Testing/Comments   Colorectal Cancer Screening  * Colonoscopy    * Fecal Occult Blood Test (FOBT)/Fecal Immunochemical Test (FIT)  * Fecal DNA/Cologuard Test  * Flexible Sigmoidoscopy Age: 39-70 years old   Colonoscopy: every 10 years (may be performed more frequently if at higher risk)  OR  FOBT/FIT: every 1 year  OR  Cologuard: every 3 years  OR  Sigmoidoscopy: every 5 years  Screening may be recommended earlier than age 39 if at higher risk for colorectal cancer  Also, an individualized decision between you and your healthcare provider will decide whether screening between the ages of 74-80 would be appropriate  Colonoscopy: 08/29/2017  FOBT/FIT: Not on file  Cologuard: Not on file  Sigmoidoscopy: Not on file    Screening Current     Breast Cancer Screening Age: 36 years old  Frequency: every 1-2 years  Not required if history of left and right mastectomy Mammogram: 09/03/2021    Screening Current   Cervical Cancer Screening Between the ages of 21-29, pap smear recommended once every 3 years  Between the ages of 33-67, can perform pap smear with HPV co-testing every 5 years     Recommendations may differ for women with a history of total hysterectomy, cervical cancer, or abnormal pap smears in past  Pap Smear: 08/31/2018    Screening Not Indicated   Hepatitis C Screening Once for adults born between 1945 and 1965  More frequently in patients at high risk for Hepatitis C Hep C Antibody: 05/02/2019    Screening Current   Diabetes Screening 1-2 times per year if you're at risk for diabetes or have pre-diabetes Fasting glucose: 140 mg/dL (8/8/2022)  A1C: 6 7 % (8/8/2022)  Screening Current   Cholesterol Screening Once every 5 years if you don't have a lipid disorder  May order more often based on risk factors  Lipid panel: 08/08/2022    Screening Not Indicated  History Lipid Disorder     Other Preventive Screenings Covered by Medicare:  1  Abdominal Aortic Aneurysm (AAA) Screening: covered once if your at risk  You're considered to be at risk if you have a family history of AAA  2  Lung Cancer Screening: covers low dose CT scan once per year if you meet all of the following conditions: (1) Age 50-69; (2) No signs or symptoms of lung cancer; (3) Current smoker or have quit smoking within the last 15 years; (4) You have a tobacco smoking history of at least 20 pack years (packs per day multiplied by number of years you smoked); (5) You get a written order from a healthcare provider  3  Glaucoma Screening: covered annually if you're considered high risk: (1) You have diabetes OR (2) Family history of glaucoma OR (3)  aged 48 and older OR (3)  American aged 72 and older  3  Osteoporosis Screening: covered every 2 years if you meet one of the following conditions: (1) You're estrogen deficient and at risk for osteoporosis based off medical history and other findings; (2) Have a vertebral abnormality; (3) On glucocorticoid therapy for more than 3 months; (4) Have primary hyperparathyroidism; (5) On osteoporosis medications and need to assess response to drug therapy  · Last bone density test (DXA Scan): 07/25/2019   5  HIV Screening: covered annually if you're between the age of 15-65  Also covered annually if you are younger than 13 and older than 72 with risk factors for HIV infection   For pregnant patients, it is covered up to 3 times per pregnancy  Immunizations:  Immunization Recommendations   Influenza Vaccine Annual influenza vaccination during flu season is recommended for all persons aged >= 6 months who do not have contraindications   Pneumococcal Vaccine   * Pneumococcal conjugate vaccine = PCV13 (Prevnar 13), PCV15 (Vaxneuvance), PCV20 (Prevnar 20)  * Pneumococcal polysaccharide vaccine = PPSV23 (Pneumovax) Adults 25-60 years old: 1-3 doses may be recommended based on certain risk factors  Adults 72 years old: 1-2 doses may be recommended based off what pneumonia vaccine you previously received   Hepatitis B Vaccine 3 dose series if at intermediate or high risk (ex: diabetes, end stage renal disease, liver disease)   Tetanus (Td) Vaccine - COST NOT COVERED BY MEDICARE PART B Following completion of primary series, a booster dose should be given every 10 years to maintain immunity against tetanus  Td may also be given as tetanus wound prophylaxis  Tdap Vaccine - COST NOT COVERED BY MEDICARE PART B Recommended at least once for all adults  For pregnant patients, recommended with each pregnancy  Shingles Vaccine (Shingrix) - COST NOT COVERED BY MEDICARE PART B  2 shot series recommended in those aged 48 and above     Health Maintenance Due:      Topic Date Due    Colorectal Cancer Screening  08/29/2022    Breast Cancer Screening: Mammogram  09/03/2022    Hepatitis C Screening  Completed     Immunizations Due:      Topic Date Due    COVID-19 Vaccine (4 - Booster for Moderna series) 02/26/2022    Influenza Vaccine (1) 09/01/2022     Advance Directives   What are advance directives? Advance directives are legal documents that state your wishes and plans for medical care  These plans are made ahead of time in case you lose your ability to make decisions for yourself  Advance directives can apply to any medical decision, such as the treatments you want, and if you want to donate organs     What are the types of advance directives? There are many types of advance directives, and each state has rules about how to use them  You may choose a combination of any of the following:  · Living will: This is a written record of the treatment you want  You can also choose which treatments you do not want, which to limit, and which to stop at a certain time  This includes surgery, medicine, IV fluid, and tube feedings  · Durable power of  for healthcare [facility]): This is a written record that states who you want to make healthcare choices for you when you are unable to make them for yourself  This person, called a proxy, is usually a family member or a friend  You may choose more than 1 proxy  · Do not resuscitate (DNR) order:  A DNR order is used in case your heart stops beating or you stop breathing  It is a request not to have certain forms of treatment, such as CPR  A DNR order may be included in other types of advance directives  · Medical directive: This covers the care that you want if you are in a coma, near death, or unable to make decisions for yourself  You can list the treatments you want for each condition  Treatment may include pain medicine, surgery, blood transfusions, dialysis, IV or tube feedings, and a ventilator (breathing machine)  · Values history: This document has questions about your views, beliefs, and how you feel and think about life  This information can help others choose the care that you would choose  Why are advance directives important? An advance directive helps you control your care  Although spoken wishes may be used, it is better to have your wishes written down  Spoken wishes can be misunderstood, or not followed  Treatments may be given even if you do not want them  An advance directive may make it easier for your family to make difficult choices about your care     Weight Management   Why it is important to manage your weight:  Being overweight increases your risk of health conditions such as heart disease, high blood pressure, type 2 diabetes, and certain types of cancer  It can also increase your risk for osteoarthritis, sleep apnea, and other respiratory problems  Aim for a slow, steady weight loss  Even a small amount of weight loss can lower your risk of health problems  How to lose weight safely:  A safe and healthy way to lose weight is to eat fewer calories and get regular exercise  You can lose up about 1 pound a week by decreasing the number of calories you eat by 500 calories each day  Healthy meal plan for weight management:  A healthy meal plan includes a variety of foods, contains fewer calories, and helps you stay healthy  A healthy meal plan includes the following:  · Eat whole-grain foods more often  A healthy meal plan should contain fiber  Fiber is the part of grains, fruits, and vegetables that is not broken down by your body  Whole-grain foods are healthy and provide extra fiber in your diet  Some examples of whole-grain foods are whole-wheat breads and pastas, oatmeal, brown rice, and bulgur  · Eat a variety of vegetables every day  Include dark, leafy greens such as spinach, kale, piero greens, and mustard greens  Eat yellow and orange vegetables such as carrots, sweet potatoes, and winter squash  · Eat a variety of fruits every day  Choose fresh or canned fruit (canned in its own juice or light syrup) instead of juice  Fruit juice has very little or no fiber  · Eat low-fat dairy foods  Drink fat-free (skim) milk or 1% milk  Eat fat-free yogurt and low-fat cottage cheese  Try low-fat cheeses such as mozzarella and other reduced-fat cheeses  · Choose meat and other protein foods that are low in fat  Choose beans or other legumes such as split peas or lentils  Choose fish, skinless poultry (chicken or turkey), or lean cuts of red meat (beef or pork)  Before you cook meat or poultry, cut off any visible fat  · Use less fat and oil    Try baking foods instead of frying them  Add less fat, such as margarine, sour cream, regular salad dressing and mayonnaise to foods  Eat fewer high-fat foods  Some examples of high-fat foods include french fries, doughnuts, ice cream, and cakes  · Eat fewer sweets  Limit foods and drinks that are high in sugar  This includes candy, cookies, regular soda, and sweetened drinks  Exercise:  Exercise at least 30 minutes per day on most days of the week  Some examples of exercise include walking, biking, dancing, and swimming  You can also fit in more physical activity by taking the stairs instead of the elevator or parking farther away from stores  Ask your healthcare provider about the best exercise plan for you  © Copyright [employer] 2018 Information is for End User's use only and may not be sold, redistributed or otherwise used for commercial purposes   All illustrations and images included in CareNotes® are the copyrighted property of [employer]  or [address] [place] [address]

## 2022-08-16 NOTE — PROGRESS NOTES
Assessment and Plan:     Problem List Items Addressed This Visit        Endocrine    Impaired fasting glucose    Relevant Orders    Hemoglobin A1C       Cardiovascular and Mediastinum    Benign essential hypertension    Relevant Orders    Comprehensive metabolic panel    CBC and differential       Other    Mixed hyperlipidemia    Relevant Orders    Lipid panel      Other Visit Diagnoses     Medicare annual wellness visit, subsequent    -  Primary    Postmenopausal        Relevant Orders    DXA bone density spine hip and pelvis           Preventive health issues were discussed with patient, and age appropriate screening tests were ordered as noted in patient's After Visit Summary  Personalized health advice and appropriate referrals for health education or preventive services given if needed, as noted in patient's After Visit Summary       History of Present Illness:     Patient presents for a Medicare Wellness Visit    HPI   Patient Care Team:  Luis Conde MD as PCP - Jomar Gomez MD as Endoscopist     Review of Systems:     Review of Systems     Problem List:     Patient Active Problem List   Diagnosis    Arthritis    Benign essential hypertension    Detached retina, left    Mixed hyperlipidemia    Impaired fasting glucose    Varicose veins of lower extremity    Thyroid nodule    Parotid neoplasm    Obesity, morbid ([address] )      Past Medical and Surgical History:     Past Medical History:   Diagnosis Date    Cataract     07/16/2017    Detached retina     left    Hyperlipidemia     Hypertension     Mass of arm     left arm      Past Surgical History:   Procedure Laterality Date    CARPAL TUNNEL RELEASE Left     CATARACT EXTRACTION Left     COLONOSCOPY      EYE SURGERY      MASS EXCISION Left 1/24/2022    Procedure: EXCISION BIOPSY TISSUE LESION/MASS UPPER EXTREMITY;  Surgeon: Reuben Celeste DO;  [facility];  Service: General    UT COLONOSCOPY FLX DX W/COLLJ SPEC WHEN PFRMD N/A 8/29/2017    Procedure: COLONOSCOPY;  Surgeon: Diego Mensah MD;  [facility]; Service: Gastroenterology    RETINAL DETACHMENT SURGERY Left       Family History:     Family History   Problem Relation Age of Onset    Coronary artery disease Mother     Diabetes Mother     COPD Father     Coronary artery disease Brother     Valvular heart disease Brother     Cancer Daughter       Social History:     Social History     Socioeconomic History    Marital status:      Spouse name: None    Number of children: 2    Years of education: None    Highest education level: None   Occupational History    Occupation:       Comment: retired    Tobacco Use    Smoking status: Never Smoker    Smokeless tobacco: Never Used   Vaping Use    Vaping Use: Never used   Substance and Sexual Activity    Alcohol use: Yes     Comment: RARELY,    Drug use: No    Sexual activity: Yes     Partners: Male   Other Topics Concern    None   Social History Narrative    Brushes teeth twice a day    Dental care    Engages in instrumental music     Exercise: Yard work     Exercises reg,      Social Determinants of Health     Financial Resource Strain: Low Risk     Difficulty of Paying Living Expenses: Not hard at all   Food Insecurity: Not on file   Transportation Needs: No Transportation Needs    Lack of Transportation (Medical): No    Lack of Transportation (Non-Medical):  No   Physical Activity: Not on file   Stress: Not on file   Social Connections: Not on file   Intimate Partner Violence: Not on file   Housing Stability: Not on file      Medications and Allergies:     Current Outpatient Medications   Medication Sig Dispense Refill    Ascorbic Acid (VITAMIN C PO) Take by mouth      atenolol (TENORMIN) 50 mg tablet TAKE 2 TABLETS BY MOUTH AT  BEDTIME 180 tablet 3    cholecalciferol (VITAMIN D3) 1,000 units tablet Take 1,000 Units by mouth daily at bedtime      losartan-hydrochlorothiazide (HYZAAR) 100-25 MG per tablet TAKE 1 TABLET BY MOUTH  DAILY 90 tablet 3    multivitamin (THERAGRAN) TABS Take 1 tablet by mouth daily      prednisoLONE acetate (PRED FORTE) 1 % ophthalmic suspension daily  1    simvastatin (ZOCOR) 40 mg tablet TAKE 1 TABLET BY MOUTH  DAILY AT BEDTIME 90 tablet 3    vitamin B-12 (VITAMIN B-12) 1,000 mcg tablet Take 1,000 mcg by mouth daily       No current facility-administered medications for this visit  Allergies   Allergen Reactions    Ciprofloxacin Other (See Comments)     Possible detached retina  Possible detached retina      Immunizations:     Immunization History   Administered Date(s) Administered    COVID-19 MODERNA VACC 0 5 ML IM 01/27/2021, 02/24/2021, 10/26/2021    INFLUENZA 11/11/2010, 10/31/2011, 01/16/2014    Influenza Quadrivalent, 6-35 Months IM 10/30/2017    Influenza Split High Dose Preservative Free IM 12/06/2016    Influenza, high dose seasonal 0 7 mL 09/23/2019, 10/08/2020    Influenza, recombinant, quadrivalent,injectable, preservative free 09/20/2018    MMR 05/09/2019    Pneumococcal Conjugate 13-Valent 09/23/2019    Pneumococcal Polysaccharide PPV23 11/11/2010, 10/08/2020, 12/10/2021    Tdap 12/06/2016    Zoster Vaccine Recombinant 11/24/2020      Health Maintenance:         Topic Date Due    Colorectal Cancer Screening  08/29/2022    Breast Cancer Screening: Mammogram  09/03/2022    Hepatitis C Screening  Completed         Topic Date Due    COVID-19 Vaccine (4 - Booster for Moderna series) 02/26/2022    Influenza Vaccine (1) 09/01/2022      Medicare Screening Tests and Risk Assessments:     Elza Mario is here for her Subsequent Wellness visit  Health Risk Assessment:   Patient rates overall health as good  Patient feels that their physical health rating is slightly better  Patient is satisfied with their life  Eyesight was rated as same  Hearing was rated as same   Patient feels that their emotional and mental health rating is same  Patients states they are never, rarely angry  Patient states they are never, rarely unusually tired/fatigued  Pain experienced in the last 7 days has been some  Patient's pain rating has been 1/10  Patient states that she has experienced no weight loss or gain in last 6 months  I have lost 5 lbs but I monitor my eating habits    Depression Screening:   PHQ-2 Score: 0      Fall Risk Screening: In the past year, patient has experienced: no history of falling in past year      Urinary Incontinence Screening:   Patient has not leaked urine accidently in the last six months  Home Safety:  Patient has trouble with stairs inside or outside of their home  Patient has working smoke alarms and has working carbon monoxide detector  Home safety hazards include: none  Nutrition:   Current diet is Low Saturated Fat  Medications:   Patient is currently taking over-the-counter supplements  OTC medications include: Daily vitamin, D3, B12, C  Patient is able to manage medications  Activities of Daily Living (ADLs)/Instrumental Activities of Daily Living (IADLs):   Walk and transfer into and out of bed and chair?: Yes  Dress and groom yourself?: Yes    Bathe or shower yourself?: Yes    Feed yourself? Yes  Do your laundry/housekeeping?: Yes  Manage your money, pay your bills and track your expenses?: Yes  Make your own meals?: Yes    Do your own shopping?: Yes    Previous Hospitalizations:   Any hospitalizations or ED visits within the last 12 months?: No      Advance Care Planning:   Living will: Yes    Durable POA for healthcare:  Yes    Advanced directive: Yes    Advanced directive counseling given: Yes      Cognitive Screening:   Provider or family/friend/caregiver concerned regarding cognition?: No    PREVENTIVE SCREENINGS      Cardiovascular Screening:    General: Screening Not Indicated and History Lipid Disorder      Diabetes Screening:     General: Screening Current Colorectal Cancer Screening:     General: Screening Current      Breast Cancer Screening:     General: Screening Current      Cervical Cancer Screening:    General: Screening Not Indicated      Osteoporosis Screening:      Due for: Bone Density Ultrasound      Abdominal Aortic Aneurysm (AAA) Screening:        General: Screening Not Indicated      Lung Cancer Screening:     General: Screening Not Indicated      Hepatitis C Screening:    General: Screening Current    Screening, Brief Intervention, and Referral to Treatment (SBIRT)    Screening  Typical number of drinks in a day: 0  Typical number of drinks in a week: 0  Interpretation: Low risk drinking behavior  AUDIT-C Screenin) How often did you have a drink containing alcohol in the past year? never  2) How many drinks did you have on a typical day when you were drinking in the past year? 0  3) How often did you have 6 or more drinks on one occasion in the past year? never    AUDIT-C Score: 0  Interpretation: Score 0-2 (female): Negative screen for alcohol misuse    Single Item Drug Screening:  How often have you used an illegal drug (including marijuana) or a prescription medication for non-medical reasons in the past year? never    Single Item Drug Screen Score: 0  Interpretation: Negative screen for possible drug use disorder    Brief Intervention  Alcohol & drug use screenings were reviewed  No concerns regarding substance use disorder identified       No exam data present     Physical Exam:     /80 (BP Location: Left arm, Patient Position: Sitting, Cuff Size: Adult)   Pulse 75   Temp 98 7 °F (37 1 °C) (Tympanic)   Resp 16   Ht 5' 9" (1 753 m)   Wt 116 kg (255 lb 12 8 oz)   SpO2 96%   BMI 37 78 kg/m²     Physical Exam     Katherin Cuevas MD

## 2022-09-07 ENCOUNTER — TELEPHONE (OUTPATIENT)
Dept: ADMINISTRATIVE | Facility: OTHER | Age: 67
End: 2022-09-07

## 2022-09-07 NOTE — TELEPHONE ENCOUNTER
Upon review of the In Basket request we were able to locate, review, and update the patient chart as requested for Mammogram     Any additional questions or concerns should be emailed to the Practice Liaisons via [email or web address] email, please do not reply via In Basket      Thank you  Cleophus Blizzard

## 2022-09-07 NOTE — TELEPHONE ENCOUNTER
----- Message from Shraddha Howard sent at 9/7/2022  7:43 AM EDT -----  Regarding: mammo  09/07/22 7:43 AM    Hello, our patient attached above has had Mammogram completed/performed  Please assist in updating the patient chart by pulling the Care Everywhere (CE) document  The date of service is 9/6/22       Thank you,  Brianna Pearson MA  [facility]

## 2022-09-20 ENCOUNTER — ANESTHESIA (OUTPATIENT)
Dept: GASTROENTEROLOGY | Facility: HOSPITAL | Age: 67
End: 2022-09-20

## 2022-09-20 ENCOUNTER — HOSPITAL ENCOUNTER (OUTPATIENT)
Dept: GASTROENTEROLOGY | Facility: HOSPITAL | Age: 67
Setting detail: OUTPATIENT SURGERY
Discharge: HOME | End: 2022-09-20
Attending: INTERNAL MEDICINE
Payer: MEDICARE

## 2022-09-20 ENCOUNTER — ANESTHESIA EVENT (OUTPATIENT)
Dept: GASTROENTEROLOGY | Facility: HOSPITAL | Age: 67
End: 2022-09-20

## 2022-09-20 VITALS
DIASTOLIC BLOOD PRESSURE: 68 MMHG | SYSTOLIC BLOOD PRESSURE: 122 MMHG | HEART RATE: 61 BPM | HEIGHT: 69 IN | WEIGHT: 251.77 LBS | BODY MASS INDEX: 37.29 KG/M2 | RESPIRATION RATE: 16 BRPM | OXYGEN SATURATION: 94 % | TEMPERATURE: 97.8 F

## 2022-09-20 DIAGNOSIS — Z12.11 SCREENING FOR COLON CANCER: ICD-10-CM

## 2022-09-20 PROCEDURE — 45385 COLONOSCOPY W/LESION REMOVAL: CPT | Performed by: INTERNAL MEDICINE

## 2022-09-20 PROCEDURE — 88305 TISSUE EXAM BY PATHOLOGIST: CPT | Performed by: STUDENT IN AN ORGANIZED HEALTH CARE EDUCATION/TRAINING PROGRAM

## 2022-09-20 RX ORDER — PROPOFOL 10 MG/ML
INJECTION, EMULSION INTRAVENOUS AS NEEDED
Status: DISCONTINUED | OUTPATIENT
Start: 2022-09-20 | End: 2022-09-20

## 2022-09-20 RX ORDER — GLYCOPYRROLATE 0.2 MG/ML
INJECTION INTRAMUSCULAR; INTRAVENOUS AS NEEDED
Status: DISCONTINUED | OUTPATIENT
Start: 2022-09-20 | End: 2022-09-20

## 2022-09-20 RX ORDER — SODIUM CHLORIDE, SODIUM LACTATE, POTASSIUM CHLORIDE, CALCIUM CHLORIDE 600; 310; 30; 20 MG/100ML; MG/100ML; MG/100ML; MG/100ML
INJECTION, SOLUTION INTRAVENOUS CONTINUOUS PRN
Status: DISCONTINUED | OUTPATIENT
Start: 2022-09-20 | End: 2022-09-20

## 2022-09-20 RX ORDER — LIDOCAINE HYDROCHLORIDE 10 MG/ML
INJECTION, SOLUTION EPIDURAL; INFILTRATION; INTRACAUDAL; PERINEURAL AS NEEDED
Status: DISCONTINUED | OUTPATIENT
Start: 2022-09-20 | End: 2022-09-20

## 2022-09-20 RX ADMIN — PROPOFOL 50 MG: 10 INJECTION, EMULSION INTRAVENOUS at 10:08

## 2022-09-20 RX ADMIN — PROPOFOL 50 MG: 10 INJECTION, EMULSION INTRAVENOUS at 10:16

## 2022-09-20 RX ADMIN — LIDOCAINE HYDROCHLORIDE 50 MG: 10 INJECTION, SOLUTION EPIDURAL; INFILTRATION; INTRACAUDAL; PERINEURAL at 10:01

## 2022-09-20 RX ADMIN — GLYCOPYRROLATE 0.2 MCG: 0.2 INJECTION, SOLUTION INTRAMUSCULAR; INTRAVENOUS at 10:09

## 2022-09-20 RX ADMIN — PROPOFOL 100 MG: 10 INJECTION, EMULSION INTRAVENOUS at 10:01

## 2022-09-20 RX ADMIN — SODIUM CHLORIDE, SODIUM LACTATE, POTASSIUM CHLORIDE, AND CALCIUM CHLORIDE: .6; .31; .03; .02 INJECTION, SOLUTION INTRAVENOUS at 09:58

## 2022-09-20 RX ADMIN — PROPOFOL 50 MG: 10 INJECTION, EMULSION INTRAVENOUS at 10:20

## 2022-09-20 RX ADMIN — PROPOFOL 50 MG: 10 INJECTION, EMULSION INTRAVENOUS at 10:05

## 2022-09-20 RX ADMIN — PROPOFOL 50 MG: 10 INJECTION, EMULSION INTRAVENOUS at 10:12

## 2022-09-20 NOTE — H&P
History and Physical -  [facility]  Nigel Mortensen 75 y o  female MRN: [id number]                  HPI: Nigel Mortensen is a 75y o  year old female who presents for colonoscopy for history of colon polyps  Last colonoscopy 5 years ago      REVIEW OF SYSTEMS: Per the HPI, and otherwise unremarkable  Historical Information   Past Medical History:   Diagnosis Date    Cataract     07/16/2017    Detached retina     left    Hyperlipidemia     Hypertension     Mass of arm     left arm      Past Surgical History:   Procedure Laterality Date    CARPAL TUNNEL RELEASE Left     CATARACT EXTRACTION Left     COLONOSCOPY      EYE SURGERY      MASS EXCISION Left 1/24/2022    Procedure: EXCISION BIOPSY TISSUE LESION/MASS UPPER EXTREMITY;  Surgeon: Johnny Hale DO;  [facility];  Service: General    NE COLONOSCOPY FLX DX W/COLLJ SPEC WHEN PFRMD N/A 8/29/2017    Procedure: COLONOSCOPY;  Surgeon: Suyapa Chiang MD;  [facility]; Service: Gastroenterology    RETINAL DETACHMENT SURGERY Left      Social History   Social History     Substance and Sexual Activity   Alcohol Use Yes    Comment: RARELY,     Social History     Substance and Sexual Activity   Drug Use No     Social History     Tobacco Use   Smoking Status Never Smoker   Smokeless Tobacco Never Used     Family History   Problem Relation Age of Onset    Coronary artery disease Mother     Diabetes Mother     COPD Father     Coronary artery disease Brother     Valvular heart disease Brother     Cancer Daughter        Meds/Allergies     (Not in a hospital admission)      Allergies   Allergen Reactions    Ciprofloxacin Other (See Comments)     Possible detached retina  Possible detached retina       Objective     There were no vitals taken for this visit        PHYSICAL EXAM    Gen: NAD  CV: RRR  CHEST: Clear  ABD: soft, NT/ND  EXT: no edema  Neuro: AAO      ASSESSMENT/PLAN:  This is a 75y o  year old female here for history of colon polyps    PLAN:   Procedure:  Colonoscopy

## 2022-09-20 NOTE — INTERVAL H&P NOTE
H&P reviewed  After examining the patient I find no changes in the patients condition since the H&P had been written      Vitals:    09/20/22 0906   BP: 137/79   Pulse: 65   Resp: 18   Temp: 97 5 °F (36 4 °C)   SpO2: 96%

## 2022-09-20 NOTE — ANESTHESIA POSTPROCEDURE EVALUATION
Post-Op Assessment Note    CV Status:  Stable    Pain management: adequate     Mental Status:  Alert and awake   Hydration Status:  Euvolemic   PONV Controlled:  Controlled   Airway Patency:  Patent      Post Op Vitals Reviewed: Yes      Staff: CRNA         No complications documented      BP   112/58   Temp      Pulse  61   Resp   16   SpO2   95

## 2022-09-20 NOTE — ANESTHESIA PREPROCEDURE EVALUATION
Procedure:  COLONOSCOPY    Relevant Problems   CARDIO   (+) Benign essential hypertension   (+) Mixed hyperlipidemia      MUSCULOSKELETAL   (+) Arthritis      Other   (+) Obesity, morbid ([facility] [address] )      Hypertension    Hyperlipidemia    Cataract 07/16/2017   Mass of arm left arm    Detached retina left       Physical Exam    Airway    Mallampati score: II  TM Distance: >3 FB  Neck ROM: full     Dental       Cardiovascular  Cardiovascular exam normal    Pulmonary  Pulmonary exam normal     Other Findings        Anesthesia Plan  ASA Score- 2     Anesthesia Type- IV sedation with anesthesia with ASA Monitors  Additional Monitors:   Airway Plan:           Plan Factors-Exercise tolerance (METS): >4 METS  Chart reviewed  EKG reviewed  Imaging results reviewed  Existing labs reviewed  Patient summary reviewed  Induction- intravenous  Postoperative Plan-     Informed Consent- Anesthetic plan and risks discussed with patient  I personally reviewed this patient with the CRNA  Discussed and agreed on the Anesthesia Plan with the CRNA  Mere Mar

## 2022-09-23 PROCEDURE — 88305 TISSUE EXAM BY PATHOLOGIST: CPT | Performed by: STUDENT IN AN ORGANIZED HEALTH CARE EDUCATION/TRAINING PROGRAM

## 2022-10-05 ENCOUNTER — HOSPITAL ENCOUNTER (OUTPATIENT)
Dept: BONE DENSITY | Facility: CLINIC | Age: 67
Discharge: HOME | End: 2022-10-05
Payer: MEDICARE

## 2022-10-05 DIAGNOSIS — Z78.0 POSTMENOPAUSAL: ICD-10-CM

## 2022-10-05 PROCEDURE — 77080 DXA BONE DENSITY AXIAL: CPT

## 2022-11-07 ENCOUNTER — OFFICE VISIT (OUTPATIENT)
Dept: INTERNAL MEDICINE CLINIC | Facility: CLINIC | Age: 67
End: 2022-11-07

## 2022-11-07 VITALS
SYSTOLIC BLOOD PRESSURE: 161 MMHG | HEIGHT: 69 IN | HEART RATE: 70 BPM | TEMPERATURE: 98 F | BODY MASS INDEX: 37.33 KG/M2 | RESPIRATION RATE: 16 BRPM | WEIGHT: 252 LBS | DIASTOLIC BLOOD PRESSURE: 87 MMHG | OXYGEN SATURATION: 97 %

## 2022-11-07 DIAGNOSIS — R42 VERTIGO: Primary | ICD-10-CM

## 2022-11-07 DIAGNOSIS — H65.01 ACUTE SEROUS OTITIS MEDIA WITHOUT RUPTURE, RIGHT: ICD-10-CM

## 2022-11-07 RX ORDER — MECLIZINE HCL 12.5 MG/1
12.5 TABLET ORAL EVERY 8 HOURS PRN
Qty: 30 TABLET | Refills: 1 | Status: SHIPPED | OUTPATIENT
Start: 2022-11-07

## 2022-11-07 RX ORDER — CEFUROXIME AXETIL 250 MG/1
250 TABLET ORAL EVERY 12 HOURS SCHEDULED
Qty: 20 TABLET | Refills: 0 | Status: SHIPPED | OUTPATIENT
Start: 2022-11-07 | End: 2022-11-17

## 2022-11-07 NOTE — PROGRESS NOTES
Assessment/Plan:   Patient Instructions   Continue to stay hydrated  Start antibiotic and finish  Always wise to take a probiotic and or eat yogurt daily when on an antibiotic  Start meclizine 12 5 mg up to every 8 hours as needed for dizziness  Follow-up in 1 week to reassess how you are doing and recheck your blood pressure  Quality Measures:   Depression Screening and Follow-up Plan: Patient was screened for depression during today's encounter  They screened negative with a PHQ-2 score of 0  Return in about 1 week (around 11/14/2022) for Recheck-Pt preference  Diagnoses and all orders for this visit:    Vertigo  -     meclizine (ANTIVERT) 12 5 MG tablet; Take 1 tablet (12 5 mg total) by mouth every 8 (eight) hours as needed for dizziness    Acute serous otitis media without rupture, right  -     cefuroxime (CEFTIN) 250 mg tablet; Take 1 tablet (250 mg total) by mouth every 12 (twelve) hours for 10 days        Subjective:      Patient ID: Srinivas Sparks is a 75 y o  female  Acute visit    Dizziness, right ear feels blocked x1 week  Awoke 1 morning, got out of bed in turned and felt the sensation of room spinning for short period  This then has happened several times  Right ear is feeling blocked with occasional tinnitus  No other upper respiratory symptoms        ALLERGIES:  Allergies   Allergen Reactions   • Ciprofloxacin Other (See Comments)     Possible detached retina  Possible detached retina       CURRENT MEDICATIONS:    Current Outpatient Medications:   •  Ascorbic Acid (VITAMIN C PO), Take by mouth, Disp: , Rfl:   •  atenolol (TENORMIN) 50 mg tablet, TAKE 2 TABLETS BY MOUTH AT  BEDTIME, Disp: 180 tablet, Rfl: 3  •  cefuroxime (CEFTIN) 250 mg tablet, Take 1 tablet (250 mg total) by mouth every 12 (twelve) hours for 10 days, Disp: 20 tablet, Rfl: 0  •  cholecalciferol (VITAMIN D3) 1,000 units tablet, Take 1,000 Units by mouth daily at bedtime, Disp: , Rfl:   • losartan-hydrochlorothiazide (HYZAAR) 100-25 MG per tablet, TAKE 1 TABLET BY MOUTH  DAILY, Disp: 90 tablet, Rfl: 3  •  meclizine (ANTIVERT) 12 5 MG tablet, Take 1 tablet (12 5 mg total) by mouth every 8 (eight) hours as needed for dizziness, Disp: 30 tablet, Rfl: 1  •  multivitamin (THERAGRAN) TABS, Take 1 tablet by mouth daily, Disp: , Rfl:   •  prednisoLONE acetate (PRED FORTE) 1 % ophthalmic suspension, daily, Disp: , Rfl: 1  •  simvastatin (ZOCOR) 40 mg tablet, TAKE 1 TABLET BY MOUTH  DAILY AT BEDTIME, Disp: 90 tablet, Rfl: 3  •  vitamin B-12 (VITAMIN B-12) 1,000 mcg tablet, Take 1,000 mcg by mouth daily, Disp: , Rfl:     ACTIVE PROBLEM LIST:  Patient Active Problem List   Diagnosis   • Arthritis   • Benign essential hypertension   • Detached retina, left   • Mixed hyperlipidemia   • Impaired fasting glucose   • Varicose veins of lower extremity   • Thyroid nodule   • Parotid neoplasm   • Obesity, morbid (HCC)       PAST MEDICAL HISTORY:  Past Medical History:   Diagnosis Date   • Cataract     07/16/2017   • Detached retina     left   • Hyperlipidemia    • Hypertension    • Mass of arm     left arm        PAST SURGICAL HISTORY:  Past Surgical History:   Procedure Laterality Date   • CARPAL TUNNEL RELEASE Left    • CATARACT EXTRACTION Left    • COLONOSCOPY     • EYE SURGERY     • MASS EXCISION Left 1/24/2022    Procedure: EXCISION BIOPSY TISSUE LESION/MASS UPPER EXTREMITY;  Surgeon: Nikita Paul DO;  [facility];  Service: General   • [place] COLONOSCOPY FLX DX W/COLLJ SPEC WHEN PFRMD N/A 8/29/2017    Procedure: COLONOSCOPY;  Surgeon: Antonia Fregoso MD;  [facility];   Service: Gastroenterology   • RETINAL DETACHMENT SURGERY Left        FAMILY HISTORY:  Family History   Problem Relation Age of Onset   • Coronary artery disease Mother    • Diabetes Mother    • COPD Father    • Coronary artery disease Brother    • Valvular heart disease Brother    • Cancer Daughter        SOCIAL HISTORY:  Social History     Socioeconomic History   • Marital status:      Spouse name: Not on file   • Number of children: 2   • Years of education: Not on file   • Highest education level: Not on file   Occupational History   • Occupation:       Comment: retired    Tobacco Use   • Smoking status: Never Smoker   • Smokeless tobacco: Never Used   Vaping Use   • Vaping Use: Never used   Substance and Sexual Activity   • Alcohol use: Yes     Comment: RARELY,   • Drug use: No   • Sexual activity: Yes     Partners: Male   Other Topics Concern   • Not on file   Social History Narrative    Brushes teeth twice a day    Dental care    Engages in instrumental music     Exercise: Yard work     Exercises reg,      Social Determinants of Health     Financial Resource Strain: Low Risk    • Difficulty of Paying Living Expenses: Not hard at all   Food Insecurity: Not on file   Transportation Needs: No Transportation Needs   • Lack of Transportation (Medical): No   • Lack of Transportation (Non-Medical): No   Physical Activity: Not on file   Stress: Not on file   Social Connections: Not on file   Intimate Partner Violence: Not on file   Housing Stability: Not on file       Review of Systems   Constitutional: Negative for activity change, chills, fatigue and fever  HENT: Positive for congestion, ear pain, hearing loss and tinnitus  Negative for postnasal drip, rhinorrhea, sinus pressure and sore throat  Eyes: Negative for discharge, redness and itching  Respiratory: Negative for cough, chest tightness and shortness of breath  Cardiovascular: Negative for chest pain, palpitations and leg swelling  Gastrointestinal: Negative for abdominal pain  Genitourinary: Negative for difficulty urinating  Musculoskeletal: Negative for arthralgias and myalgias  Skin: Negative for rash  Allergic/Immunologic: Negative for immunocompromised state  Neurological: Positive for dizziness   Negative for syncope, weakness, light-headedness and headaches  Hematological: Negative for adenopathy  Does not bruise/bleed easily  Psychiatric/Behavioral: Negative for dysphoric mood  The patient is not nervous/anxious  Objective:  Vitals:    11/07/22 1009   BP: 161/87   BP Location: Left arm   Patient Position: Sitting   Cuff Size: Standard   Pulse: 70   Resp: 16   Temp: 98 °F (36 7 °C)   TempSrc: Tympanic   SpO2: 97%   Weight: 114 kg (252 lb)   Height: 5' 9" (1 753 m)     Body mass index is 37 21 kg/m²  Physical Exam  Vitals and nursing note reviewed  Constitutional:       General: She is not in acute distress  Appearance: She is well-developed  HENT:      Head: Normocephalic and atraumatic  Right Ear: Ear canal and external ear normal       Left Ear: Tympanic membrane, ear canal and external ear normal       Ears:      Comments: Right tympanic membrane is bulging with fluid behind and hint of redness     Nose: No congestion  Mouth/Throat:      Mouth: Mucous membranes are moist       Pharynx: Oropharynx is clear  Eyes:      Extraocular Movements: Extraocular movements intact  Pupils: Pupils are equal, round, and reactive to light  Neck:      Thyroid: No thyromegaly  Vascular: No carotid bruit or JVD  Cardiovascular:      Rate and Rhythm: Normal rate and regular rhythm  Heart sounds: Normal heart sounds  Pulmonary:      Effort: Pulmonary effort is normal  No respiratory distress  Breath sounds: Normal breath sounds  Musculoskeletal:      Cervical back: Neck supple  Right lower leg: No edema  Left lower leg: No edema  Lymphadenopathy:      Cervical: No cervical adenopathy  Skin:     General: Skin is warm and dry  Findings: No rash  Neurological:      General: No focal deficit present  Mental Status: She is alert and oriented to person, place, and time  Mental status is at baseline     Psychiatric:         Mood and Affect: Mood normal          Behavior: Behavior normal            RESULTS:    No results found for this or any previous visit (from the past 1008 hour(s))  This note was created with voice recognition software  Phonic, grammatical and spelling errors may be present within the note as a result

## 2022-11-07 NOTE — PATIENT INSTRUCTIONS
Continue to stay hydrated  Start antibiotic and finish  Always wise to take a probiotic and or eat yogurt daily when on an antibiotic  Start meclizine 12 5 mg up to every 8 hours as needed for dizziness  Follow-up in 1 week to reassess how you are doing and recheck your blood pressure

## 2022-11-15 ENCOUNTER — OFFICE VISIT (OUTPATIENT)
Dept: INTERNAL MEDICINE CLINIC | Facility: CLINIC | Age: 67
End: 2022-11-15

## 2022-11-15 VITALS
HEIGHT: 69 IN | DIASTOLIC BLOOD PRESSURE: 82 MMHG | WEIGHT: 253 LBS | RESPIRATION RATE: 14 BRPM | BODY MASS INDEX: 37.47 KG/M2 | HEART RATE: 64 BPM | SYSTOLIC BLOOD PRESSURE: 134 MMHG

## 2022-11-15 DIAGNOSIS — H65.01 ACUTE SEROUS OTITIS MEDIA WITHOUT RUPTURE, RIGHT: Primary | ICD-10-CM

## 2022-11-15 RX ORDER — METHENAMINE HIPPURATE 1000 MG/1
1 TABLET ORAL DAILY
COMMUNITY
Start: 2022-10-24

## 2022-11-15 NOTE — PROGRESS NOTES
Assessment/Plan:   Patient Instructions   Finish antibiotic  Can now use meclizine as needed for dizziness  Start either Flonase or Nasacort nasal spray 1 spray into each nostril twice a day has demonstrated an instructed  Remember it will take approximately 5 days to start working, longer to clear your ear  Quality Measures:   Depression Screening and Follow-up Plan: Patient was screened for depression during today's encounter  They screened negative with a PHQ-2 score of 0  Return for Next scheduled follow up  Diagnoses and all orders for this visit:    Acute serous otitis media without rupture, right    Other orders  -     methenamine hippurate (HIPREX) 1 g tablet; Take 1 g by mouth daily        Subjective:      Patient ID: Rishi Do is a 75 y o  female  Follow-up    Patient states overall she is feeling better, vertigo is improved, still has a fullness feeling in her right ear        ALLERGIES:  Allergies   Allergen Reactions   • Ciprofloxacin Other (See Comments)     Possible detached retina  Possible detached retina       CURRENT MEDICATIONS:    Current Outpatient Medications:   •  Ascorbic Acid (VITAMIN C PO), Take by mouth, Disp: , Rfl:   •  atenolol (TENORMIN) 50 mg tablet, TAKE 2 TABLETS BY MOUTH AT  BEDTIME, Disp: 180 tablet, Rfl: 3  •  cefuroxime (CEFTIN) 250 mg tablet, Take 1 tablet (250 mg total) by mouth every 12 (twelve) hours for 10 days, Disp: 20 tablet, Rfl: 0  •  cholecalciferol (VITAMIN D3) 1,000 units tablet, Take 1,000 Units by mouth daily at bedtime, Disp: , Rfl:   •  losartan-hydrochlorothiazide (HYZAAR) 100-25 MG per tablet, TAKE 1 TABLET BY MOUTH  DAILY, Disp: 90 tablet, Rfl: 3  •  meclizine (ANTIVERT) 12 5 MG tablet, Take 1 tablet (12 5 mg total) by mouth every 8 (eight) hours as needed for dizziness, Disp: 30 tablet, Rfl: 1  •  methenamine hippurate (HIPREX) 1 g tablet, Take 1 g by mouth daily, Disp: , Rfl:   •  multivitamin (THERAGRAN) TABS, Take 1 tablet by mouth daily, Disp: , Rfl:   •  prednisoLONE acetate (PRED FORTE) 1 % ophthalmic suspension, daily, Disp: , Rfl: 1  •  simvastatin (ZOCOR) 40 mg tablet, TAKE 1 TABLET BY MOUTH  DAILY AT BEDTIME, Disp: 90 tablet, Rfl: 3  •  vitamin B-12 (VITAMIN B-12) 1,000 mcg tablet, Take 1,000 mcg by mouth daily, Disp: , Rfl:     ACTIVE PROBLEM LIST:  Patient Active Problem List   Diagnosis   • Arthritis   • Benign essential hypertension   • Detached retina, left   • Mixed hyperlipidemia   • Impaired fasting glucose   • Varicose veins of lower extremity   • Thyroid nodule   • Parotid neoplasm   • Obesity, morbid (HCC)       PAST MEDICAL HISTORY:  Past Medical History:   Diagnosis Date   • Cataract     07/16/2017   • Detached retina     left   • Hyperlipidemia    • Hypertension    • Mass of arm     left arm        PAST SURGICAL HISTORY:  Past Surgical History:   Procedure Laterality Date   • CARPAL TUNNEL RELEASE Left    • CATARACT EXTRACTION Left    • COLONOSCOPY     • EYE SURGERY     • MASS EXCISION Left 1/24/2022    Procedure: EXCISION BIOPSY TISSUE LESION/MASS UPPER EXTREMITY;  Surgeon: Jeff Samuel DO;  Location: [facility];  Service: General   • ME COLONOSCOPY FLX DX W/COLLJ SPEC WHEN PFRMD N/A 8/29/2017    Procedure: COLONOSCOPY;  Surgeon: Maria Isabel Escobar MD;  Location: [facility];   Service: Gastroenterology   • RETINAL DETACHMENT SURGERY Left        FAMILY HISTORY:  Family History   Problem Relation Age of Onset   • Coronary artery disease Mother    • Diabetes Mother    • COPD Father    • Coronary artery disease Brother    • Valvular heart disease Brother    • Cancer Daughter        SOCIAL HISTORY:  Social History     Socioeconomic History   • Marital status:      Spouse name: Not on file   • Number of children: 2   • Years of education: Not on file   • Highest education level: Not on file   Occupational History   • Occupation:       Comment: retired    Tobacco Use   • Smoking status: Never Smoker   • Smokeless tobacco: Never Used   Vaping Use   • Vaping Use: Never used   Substance and Sexual Activity   • Alcohol use: Yes     Comment: RARELY,   • Drug use: No   • Sexual activity: Yes     Partners: Male   Other Topics Concern   • Not on file   Social History Narrative    Brushes teeth twice a day    Dental care    Engages in instrumental music     Exercise: Yard work     Exercises reg,      Social Determinants of Health     Financial Resource Strain: Low Risk    • Difficulty of Paying Living Expenses: Not hard at all   Food Insecurity: Not on file   Transportation Needs: No Transportation Needs   • Lack of Transportation (Medical): No   • Lack of Transportation (Non-Medical): No   Physical Activity: Not on file   Stress: Not on file   Social Connections: Not on file   Intimate Partner Violence: Not on file   Housing Stability: Not on file       Review of Systems   Constitutional: Negative for activity change, chills, fatigue and fever  HENT: Positive for ear pain  Negative for congestion  Eyes: Negative for discharge  Respiratory: Negative for cough, chest tightness and shortness of breath  Cardiovascular: Negative for chest pain, palpitations and leg swelling  Gastrointestinal: Negative for abdominal pain  Endocrine: Negative for polydipsia, polyphagia and polyuria  Genitourinary: Negative for difficulty urinating  Musculoskeletal: Negative for arthralgias and myalgias  Skin: Negative for rash  Allergic/Immunologic: Negative for immunocompromised state  Neurological: Negative for dizziness, syncope, weakness, light-headedness and headaches  Hematological: Negative for adenopathy  Does not bruise/bleed easily  Psychiatric/Behavioral: Negative for dysphoric mood  The patient is not nervous/anxious            Objective:  Vitals:    11/15/22 0822 11/15/22 0855   BP: 144/82 134/82   BP Location: Left arm Left arm   Patient Position: Sitting Sitting   Pulse: 64    Resp: 14 Weight: 115 kg (253 lb)    Height: 5' 9" (1 753 m)      Body mass index is 37 36 kg/m²  Physical Exam  Vitals and nursing note reviewed  Constitutional:       General: She is not in acute distress  Appearance: She is well-developed  She is not ill-appearing  HENT:      Head: Normocephalic and atraumatic  Right Ear: Ear canal and external ear normal       Left Ear: Tympanic membrane, ear canal and external ear normal       Ears:      Comments: Right TM still slightly retracted with distorted light reflex and minimal fluid behind  Eyes:      Extraocular Movements: Extraocular movements intact  Pupils: Pupils are equal, round, and reactive to light  Pulmonary:      Effort: Pulmonary effort is normal  No respiratory distress  Musculoskeletal:      Right lower leg: No edema  Left lower leg: No edema  Skin:     General: Skin is warm and dry  Findings: No rash  Neurological:      General: No focal deficit present  Mental Status: She is alert and oriented to person, place, and time  Mental status is at baseline  Psychiatric:         Mood and Affect: Mood normal          Behavior: Behavior normal            RESULTS:    No results found for this or any previous visit (from the past 1008 hour(s))  This note was created with voice recognition software  Phonic, grammatical and spelling errors may be present within the note as a result

## 2022-11-15 NOTE — PATIENT INSTRUCTIONS
Finish antibiotic  Can now use meclizine as needed for dizziness  Start either Flonase or Nasacort nasal spray 1 spray into each nostril twice a day has demonstrated an instructed  Remember it will take approximately 5 days to start working, longer to clear your ear

## 2022-12-05 ENCOUNTER — APPOINTMENT (OUTPATIENT)
Dept: LAB | Facility: CLINIC | Age: 67
End: 2022-12-05

## 2022-12-05 DIAGNOSIS — R73.01 IMPAIRED FASTING GLUCOSE: ICD-10-CM

## 2022-12-05 DIAGNOSIS — I10 BENIGN ESSENTIAL HYPERTENSION: ICD-10-CM

## 2022-12-05 DIAGNOSIS — E78.2 MIXED HYPERLIPIDEMIA: ICD-10-CM

## 2022-12-05 LAB
ALBUMIN SERPL BCP-MCNC: 3.8 G/DL (ref 3.5–5)
ALP SERPL-CCNC: 63 U/L (ref 46–116)
ALT SERPL W P-5'-P-CCNC: 28 U/L (ref 12–78)
ANION GAP SERPL CALCULATED.3IONS-SCNC: 7 MMOL/L (ref 4–13)
AST SERPL W P-5'-P-CCNC: 19 U/L (ref 5–45)
BASOPHILS # BLD AUTO: 0.04 THOUSANDS/ÂΜL (ref 0–0.1)
BASOPHILS NFR BLD AUTO: 1 % (ref 0–1)
BILIRUB SERPL-MCNC: 2.4 MG/DL (ref 0.2–1)
BUN SERPL-MCNC: 22 MG/DL (ref 5–25)
CALCIUM SERPL-MCNC: 9.7 MG/DL (ref 8.3–10.1)
CHLORIDE SERPL-SCNC: 102 MMOL/L (ref 96–108)
CHOLEST SERPL-MCNC: 132 MG/DL
CO2 SERPL-SCNC: 27 MMOL/L (ref 21–32)
CREAT SERPL-MCNC: 0.71 MG/DL (ref 0.6–1.3)
EOSINOPHIL # BLD AUTO: 0.09 THOUSAND/ÂΜL (ref 0–0.61)
EOSINOPHIL NFR BLD AUTO: 1 % (ref 0–6)
ERYTHROCYTE [DISTWIDTH] IN BLOOD BY AUTOMATED COUNT: 13.3 % (ref 11.6–15.1)
EST. AVERAGE GLUCOSE BLD GHB EST-MCNC: 128 MG/DL
GFR SERPL CREATININE-BSD FRML MDRD: 87 ML/MIN/1.73SQ M
GLUCOSE P FAST SERPL-MCNC: 145 MG/DL (ref 65–99)
HBA1C MFR BLD: 6.1 %
HCT VFR BLD AUTO: 43.3 % (ref 34.8–46.1)
HDLC SERPL-MCNC: 41 MG/DL
HGB BLD-MCNC: 14.4 G/DL (ref 11.5–15.4)
IMM GRANULOCYTES # BLD AUTO: 0.02 THOUSAND/UL (ref 0–0.2)
IMM GRANULOCYTES NFR BLD AUTO: 0 % (ref 0–2)
LDLC SERPL CALC-MCNC: 47 MG/DL (ref 0–100)
LYMPHOCYTES # BLD AUTO: 2.6 THOUSANDS/ÂΜL (ref 0.6–4.47)
LYMPHOCYTES NFR BLD AUTO: 30 % (ref 14–44)
MCH RBC QN AUTO: 29.4 PG (ref 26.8–34.3)
MCHC RBC AUTO-ENTMCNC: 33.3 G/DL (ref 31.4–37.4)
MCV RBC AUTO: 89 FL (ref 82–98)
MONOCYTES # BLD AUTO: 0.58 THOUSAND/ÂΜL (ref 0.17–1.22)
MONOCYTES NFR BLD AUTO: 7 % (ref 4–12)
NEUTROPHILS # BLD AUTO: 5.24 THOUSANDS/ÂΜL (ref 1.85–7.62)
NEUTS SEG NFR BLD AUTO: 61 % (ref 43–75)
NONHDLC SERPL-MCNC: 91 MG/DL
NRBC BLD AUTO-RTO: 0 /100 WBCS
PLATELET # BLD AUTO: 203 THOUSANDS/UL (ref 149–390)
PMV BLD AUTO: 11.6 FL (ref 8.9–12.7)
POTASSIUM SERPL-SCNC: 3.9 MMOL/L (ref 3.5–5.3)
PROT SERPL-MCNC: 7.6 G/DL (ref 6.4–8.4)
RBC # BLD AUTO: 4.89 MILLION/UL (ref 3.81–5.12)
SODIUM SERPL-SCNC: 136 MMOL/L (ref 135–147)
TRIGL SERPL-MCNC: 222 MG/DL
WBC # BLD AUTO: 8.57 THOUSAND/UL (ref 4.31–10.16)

## 2022-12-08 ENCOUNTER — RA CDI HCC (OUTPATIENT)
Dept: OTHER | Facility: HOSPITAL | Age: 67
End: 2022-12-08

## 2022-12-14 ENCOUNTER — OFFICE VISIT (OUTPATIENT)
Dept: INTERNAL MEDICINE CLINIC | Facility: CLINIC | Age: 67
End: 2022-12-14

## 2022-12-14 VITALS
OXYGEN SATURATION: 96 % | BODY MASS INDEX: 37.62 KG/M2 | DIASTOLIC BLOOD PRESSURE: 88 MMHG | SYSTOLIC BLOOD PRESSURE: 130 MMHG | HEART RATE: 71 BPM | HEIGHT: 69 IN | WEIGHT: 254 LBS | RESPIRATION RATE: 12 BRPM

## 2022-12-14 DIAGNOSIS — M25.562 CHRONIC PAIN OF LEFT KNEE: ICD-10-CM

## 2022-12-14 DIAGNOSIS — E80.6 HYPERBILIRUBINEMIA: ICD-10-CM

## 2022-12-14 DIAGNOSIS — I10 BENIGN ESSENTIAL HYPERTENSION: Primary | ICD-10-CM

## 2022-12-14 DIAGNOSIS — R73.01 IMPAIRED FASTING GLUCOSE: ICD-10-CM

## 2022-12-14 DIAGNOSIS — G89.29 CHRONIC PAIN OF LEFT KNEE: ICD-10-CM

## 2022-12-14 DIAGNOSIS — E78.2 MIXED HYPERLIPIDEMIA: ICD-10-CM

## 2022-12-14 NOTE — PROGRESS NOTES
Assessment/Plan:     Chronic problems appear stable  For her ongoing knee issues, she would like to see orthopedics I put that referral in  Her bilirubin is slightly higher than her usual range  She remembers the phrase Gilbert's syndrome so that is probably what it is  Ordered repeat labs nonfasting  If unclear, we will have her seen by her GI specialist      Quality Measures:       No follow-ups on file  No problem-specific Assessment & Plan notes found for this encounter  Diagnoses and all orders for this visit:    Benign essential hypertension    Impaired fasting glucose    Mixed hyperlipidemia    Chronic pain of left knee  -     Ambulatory referral to Orthopedic Surgery; Future    Hyperbilirubinemia  -     Hepatic function panel; Future  -     Bilirubin, Total and Direct; Future  -     Gamma GT; Future        Subjective:      Patient ID: Jaun Perea is a 75 y o  female  Patient comes in today for routine follow-up  She states she is doing well for the most part  Her numbers have come down  Sugar is much better  Cholesterol is down, especially triglycerides  Weight is about the same  She has backed off on her walking, partly because her left knee is bothering her  She knows she is developing arthritis there  She has seen orthopedics in the past but would like to see them again  Denies any other new complaints today  Taking her medicines as directed        ALLERGIES:  Allergies   Allergen Reactions   • Ciprofloxacin Other (See Comments)     Possible detached retina  Possible detached retina       CURRENT MEDICATIONS:    Current Outpatient Medications:   •  Ascorbic Acid (VITAMIN C PO), Take by mouth, Disp: , Rfl:   •  atenolol (TENORMIN) 50 mg tablet, TAKE 2 TABLETS BY MOUTH AT  BEDTIME, Disp: 180 tablet, Rfl: 3  •  cholecalciferol (VITAMIN D3) 1,000 units tablet, Take 1,000 Units by mouth daily at bedtime, Disp: , Rfl:   •  losartan-hydrochlorothiazide (HYZAAR) 100-25 MG per tablet, TAKE 1 TABLET BY MOUTH  DAILY, Disp: 90 tablet, Rfl: 3  •  methenamine hippurate (HIPREX) 1 g tablet, Take 1 g by mouth daily, Disp: , Rfl:   •  multivitamin (THERAGRAN) TABS, Take 1 tablet by mouth daily, Disp: , Rfl:   •  prednisoLONE acetate (PRED FORTE) 1 % ophthalmic suspension, daily, Disp: , Rfl: 1  •  simvastatin (ZOCOR) 40 mg tablet, TAKE 1 TABLET BY MOUTH  DAILY AT BEDTIME, Disp: 90 tablet, Rfl: 3  •  vitamin B-12 (VITAMIN B-12) 1,000 mcg tablet, Take 1,000 mcg by mouth daily, Disp: , Rfl:     ACTIVE PROBLEM LIST:  Patient Active Problem List   Diagnosis   • Arthritis   • Benign essential hypertension   • Detached retina, left   • Mixed hyperlipidemia   • Impaired fasting glucose   • Varicose veins of lower extremity   • Thyroid nodule   • Parotid neoplasm   • Obesity, morbid (HCC)       PAST MEDICAL HISTORY:  Past Medical History:   Diagnosis Date   • Cataract     07/16/2017   • Detached retina     left   • Hyperlipidemia    • Hypertension    • Mass of arm     left arm        PAST SURGICAL HISTORY:  Past Surgical History:   Procedure Laterality Date   • CARPAL TUNNEL RELEASE Left    • CATARACT EXTRACTION Left    • COLONOSCOPY     • EYE SURGERY     • MASS EXCISION Left 1/24/2022    Procedure: EXCISION BIOPSY TISSUE LESION/MASS UPPER EXTREMITY;  Surgeon: Jeff Samuel DO;  Location: [facility];  Service: General   • IN COLONOSCOPY FLX DX W/COLLJ SPEC WHEN PFRMD N/A 8/29/2017    Procedure: COLONOSCOPY;  Surgeon: Maria Isabel Escobar MD;  Location: [facility];   Service: Gastroenterology   • RETINAL DETACHMENT SURGERY Left        FAMILY HISTORY:  Family History   Problem Relation Age of Onset   • Coronary artery disease Mother    • Diabetes Mother    • COPD Father    • Coronary artery disease Brother    • Valvular heart disease Brother    • Cancer Daughter        SOCIAL HISTORY:  Social History     Socioeconomic History   • Marital status:      Spouse name: Not on file   • Number of children: 2   • Years of education: Not on file   • Highest education level: Not on file   Occupational History   • Occupation:       Comment: retired    Tobacco Use   • Smoking status: Never   • Smokeless tobacco: Never   Vaping Use   • Vaping Use: Never used   Substance and Sexual Activity   • Alcohol use: Yes     Comment: RARELY,   • Drug use: No   • Sexual activity: Yes     Partners: Male   Other Topics Concern   • Not on file   Social History Narrative    Brushes teeth twice a day    Dental care    Engages in instrumental music     Exercise: Yard work     Exercises reg,      Social Determinants of Health     Financial Resource Strain: Low Risk    • Difficulty of Paying Living Expenses: Not hard at all   Food Insecurity: Not on file   Transportation Needs: No Transportation Needs   • Lack of Transportation (Medical): No   • Lack of Transportation (Non-Medical): No   Physical Activity: Not on file   Stress: Not on file   Social Connections: Not on file   Intimate Partner Violence: Not on file   Housing Stability: Not on file       Review of Systems   Respiratory: Negative for shortness of breath  Cardiovascular: Negative for chest pain  Gastrointestinal: Negative for abdominal pain  Objective:  Vitals:    12/14/22 0817   BP: 130/88   BP Location: Left arm   Patient Position: Sitting   Pulse: 71   Resp: 12   SpO2: 96%   Weight: 115 kg (254 lb)   Height: 5' 9" (1 753 m)     Body mass index is 37 51 kg/m²  Physical Exam  Vitals and nursing note reviewed  Constitutional:       Appearance: She is well-developed  Cardiovascular:      Rate and Rhythm: Normal rate and regular rhythm  Heart sounds: Normal heart sounds  Pulmonary:      Effort: Pulmonary effort is normal       Breath sounds: Normal breath sounds  Abdominal:      Palpations: Abdomen is soft  Tenderness: There is no abdominal tenderness  Neurological:      Mental Status: She is alert and oriented to person, place, and time  RESULTS:    Recent Results (from the past 1008 hour(s))   Comprehensive metabolic panel    Collection Time: 12/05/22  8:09 AM   Result Value Ref Range    Sodium 136 135 - 147 mmol/L    Potassium 3 9 3 5 - 5 3 mmol/L    Chloride 102 96 - 108 mmol/L    CO2 27 21 - 32 mmol/L    ANION GAP 7 4 - 13 mmol/L    BUN 22 5 - 25 mg/dL    Creatinine 0 71 0 60 - 1 30 mg/dL    Glucose, Fasting 145 (H) 65 - 99 mg/dL    Calcium 9 7 8 3 - 10 1 mg/dL    AST 19 5 - 45 U/L    ALT 28 12 - 78 U/L    Alkaline Phosphatase 63 46 - 116 U/L    Total Protein 7 6 6 4 - 8 4 g/dL    Albumin 3 8 3 5 - 5 0 g/dL    Total Bilirubin 2 40 (H) 0 20 - 1 00 mg/dL    eGFR 87 ml/min/1 73sq m   CBC and differential    Collection Time: 12/05/22  8:09 AM   Result Value Ref Range    WBC 8 57 4 31 - 10 16 Thousand/uL    RBC 4 89 3 81 - 5 12 Million/uL    Hemoglobin 14 4 11 5 - 15 4 g/dL    Hematocrit 43 3 34 8 - 46 1 %    MCV 89 82 - 98 fL    MCH 29 4 26 8 - 34 3 pg    MCHC 33 3 31 4 - 37 4 g/dL    RDW 13 3 11 6 - 15 1 %    MPV 11 6 8 9 - 12 7 fL    Platelets 547 033 - 343 Thousands/uL    nRBC 0 /100 WBCs    Neutrophils Relative 61 43 - 75 %    Immat GRANS % 0 0 - 2 %    Lymphocytes Relative 30 14 - 44 %    Monocytes Relative 7 4 - 12 %    Eosinophils Relative 1 0 - 6 %    Basophils Relative 1 0 - 1 %    Neutrophils Absolute 5 24 1 85 - 7 62 Thousands/µL    Immature Grans Absolute 0 02 0 00 - 0 20 Thousand/uL    Lymphocytes Absolute 2 60 0 60 - 4 47 Thousands/µL    Monocytes Absolute 0 58 0 17 - 1 22 Thousand/µL    Eosinophils Absolute 0 09 0 00 - 0 61 Thousand/µL    Basophils Absolute 0 04 0 00 - 0 10 Thousands/µL   Hemoglobin A1C    Collection Time: 12/05/22  8:09 AM   Result Value Ref Range    Hemoglobin A1C 6 1 (H) Normal 3 8-5 6%; PreDiabetic 5 7-6 4%;  Diabetic >=6 5%; Glycemic control for adults with diabetes <7 0% %     mg/dl   Lipid panel    Collection Time: 12/05/22  8:09 AM   Result Value Ref Range    Cholesterol 132 See Comment mg/dL Triglycerides 222 (H) See Comment mg/dL    HDL, Direct 41 (L) >=50 mg/dL    LDL Calculated 47 0 - 100 mg/dL    Non-HDL-Chol (CHOL-HDL) 91 mg/dl       This note was created with voice recognition software  Phonic, grammatical and spelling errors may be present within the note as a result

## 2022-12-15 ENCOUNTER — TELEPHONE (OUTPATIENT)
Dept: INTERNAL MEDICINE CLINIC | Facility: CLINIC | Age: 67
End: 2022-12-15

## 2022-12-15 NOTE — TELEPHONE ENCOUNTER
Patient would like to talk to you regarding something that was put in her AVS yesterday  She would like to clear this up  Please advise    Christi Rico

## 2022-12-19 ENCOUNTER — APPOINTMENT (OUTPATIENT)
Dept: LAB | Facility: CLINIC | Age: 67
End: 2022-12-19

## 2022-12-19 DIAGNOSIS — E80.6 HYPERBILIRUBINEMIA: Primary | ICD-10-CM

## 2022-12-19 LAB
ALBUMIN SERPL BCP-MCNC: 4 G/DL (ref 3.5–5)
ALP SERPL-CCNC: 62 U/L (ref 46–116)
ALT SERPL W P-5'-P-CCNC: 31 U/L (ref 12–78)
AST SERPL W P-5'-P-CCNC: 20 U/L (ref 5–45)
BILIRUB DIRECT SERPL-MCNC: 0.23 MG/DL (ref 0–0.2)
BILIRUB SERPL-MCNC: 1.07 MG/DL (ref 0.2–1)
GGT SERPL-CCNC: 25 U/L (ref 5–85)
PROT SERPL-MCNC: 7.4 G/DL (ref 6.4–8.4)

## 2023-04-18 PROBLEM — D49.0 PAROTID NEOPLASM: Status: RESOLVED | Noted: 2019-10-23 | Resolved: 2023-04-18

## 2023-04-18 PROBLEM — E11.9 TYPE 2 DIABETES MELLITUS WITHOUT COMPLICATION, WITHOUT LONG-TERM CURRENT USE OF INSULIN (HCC): Status: ACTIVE | Noted: 2017-06-16

## 2023-05-10 ENCOUNTER — TELEPHONE (OUTPATIENT)
Dept: INTERNAL MEDICINE CLINIC | Facility: CLINIC | Age: 68
End: 2023-05-10

## 2023-05-10 DIAGNOSIS — S59.909A ELBOW INJURY, UNSPECIFIED LATERALITY, INITIAL ENCOUNTER: Primary | ICD-10-CM

## 2023-05-10 DIAGNOSIS — M25.529 ELBOW PAIN, UNSPECIFIED LATERALITY: ICD-10-CM

## 2023-05-10 NOTE — TELEPHONE ENCOUNTER
Pt asking for xray or ortho ? My phone number is [phone]  My issue is earlier today I fell and I banged my elbow pretty badly  Leo Baxter don't think there's anything broken, but I'm looking to see a recommendation for Claudine Quiros just so I could have it X-rayed  Right now it it's just hurting as far as the muscles and tendons and I have been icing it  I would appreciate you getting back to me later today  Thank you

## 2023-05-11 ENCOUNTER — TELEPHONE (OUTPATIENT)
Dept: OBGYN CLINIC | Facility: HOSPITAL | Age: 68
End: 2023-05-11

## 2023-05-11 ENCOUNTER — OFFICE VISIT (OUTPATIENT)
Dept: OBGYN CLINIC | Facility: CLINIC | Age: 68
End: 2023-05-11

## 2023-05-11 ENCOUNTER — APPOINTMENT (OUTPATIENT)
Dept: RADIOLOGY | Facility: CLINIC | Age: 68
End: 2023-05-11

## 2023-05-11 VITALS
RESPIRATION RATE: 18 BRPM | DIASTOLIC BLOOD PRESSURE: 78 MMHG | SYSTOLIC BLOOD PRESSURE: 122 MMHG | HEIGHT: 69 IN | WEIGHT: 264 LBS | BODY MASS INDEX: 39.1 KG/M2 | OXYGEN SATURATION: 98 % | HEART RATE: 68 BPM

## 2023-05-11 DIAGNOSIS — S52.125A CLOSED NONDISPLACED FRACTURE OF HEAD OF LEFT RADIUS, INITIAL ENCOUNTER: Primary | ICD-10-CM
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
## 2023-05-11 DIAGNOSIS — M25.552 LEFT HIP PAIN: ICD-10-CM

## 2023-05-11 DIAGNOSIS — M25.529 ELBOW PAIN, UNSPECIFIED LATERALITY: ICD-10-CM

## 2023-05-11 DIAGNOSIS — M79.602 PAIN OF LEFT UPPER EXTREMITY: ICD-10-CM

## 2023-05-11 DIAGNOSIS — S70.02XA CONTUSION OF LEFT HIP, INITIAL ENCOUNTER: ICD-10-CM

## 2023-05-11 RX ORDER — NAPROXEN 500 MG/1
500 TABLET ORAL 2 TIMES DAILY WITH MEALS
Qty: 60 TABLET | Refills: 0 | Status: SHIPPED | OUTPATIENT
Start: 2023-05-11

## 2023-05-11 NOTE — TELEPHONE ENCOUNTER
Patient called back and asked that an Ortho referral be put in  Patient is going to call Dr Jessica Rodas office this morning

## 2023-05-11 NOTE — PROGRESS NOTES
"     Subjective:  Denny Glez is a 70 y o  female complains of left hip and elbow pain  Onset of the symptoms was yesterday  Mechanism of injury: fall at home after she tripped on her shoe walking down stairs, landed directly onto left hip and elbow  Aggravating factors: pronation and supination of forearm  Treatment to date: rest  Symptoms have gradually improved  Denies any numbness or tingling in the lower extremity  Has no hip pain that she reports in office today  Does note some ecchymosis on the lateral aspect of the hip  Does have pain in the elbow and this is exacerbated with pronation supination of the forearm and full extension of the elbow  No numbness or tingling in the upper extremity    The following portions of the patient's history were reviewed and updated as appropriate: allergies, current medications, past family history, past medical history, past social history, past surgical history and problem list     Occupation:      Review of Systems   Constitutional: Negative for fever  Musculoskeletal: Positive for elbow pain  Neuro: Negative for numbness or tingling in arm       Objective:  /78   Pulse 68   Resp 18   Ht 5' 9\" (1 753 m)   Wt 120 kg (264 lb)   SpO2 98%   BMI 38 99 kg/m²     Skin: no rashes, lesions, skin discolorations, lacerations  Vasculature: normal radial and ulnar pulse, normal skin color, normal capillary refill in extremity, no upper extremity edema  Neurologic: Neurologic exam is normal throughout upper extremities, Awake, alert, and oriented x3, no apparent distress     Musculoskeletal:   Left elbow                                        Skin: normal                                            General Appearance: normal                                 Muscle Tone: normal                          Palpation-Tenderness:  Negative medial epicondyle/negative lateral epicondyle, negative flexor wad, positive radiocapitellar joint    Range of Motion  Flexion: " full  Extension: Limited by approximately 10 degrees  Supination: Limited secondary to pain  Pronation: Limited secondary to pain      Hook test reveals intact biceps tendon     + TTP proximal radius    Strength:    Biceps: normal  Triceps: normal      Left hip exam  Notable ecchymosis over the lateral aspect of the hip  No tenderness to palpation over the AIIS or ASIS or greater trochanter  Full range of motion with flexion and extension of the hip without any reproduction of pain  Negative FADIR negative Reji Ream  Able to perform resisted straight leg raise    Neurovascular Exam:     Radial/Ulnar/Median Nerves: normal    Imaging:    No results found  Assessment/Plan:    1  Fall, initial encounter    - XR forearm 2 vw left; Future  - XR humerus left; Future  - Immob Shoulder Univeral    2  Closed nondisplaced fracture of head of left radius, initial encounter  Radiographs obtained in office today revealed nondisplaced fracture of the left radial head  Patient's exam in office today is limited secondary to pain due to acute injury fracture  Follow-up official radiology report  We discussed the nature of radial head fracture and the recommended treatment plan  Advising patient remain in a sling for comfort use for 1 week at which time patient will return for reevaluation  If patient's pain symptoms have improved we will initiate physical therapy at that visit  Patient does not exhibit any mechanical block however we will have a better reevaluation at the next visit as her pain levels improve  - Ambulatory Referral to Orthopedic Surgery  - XR forearm 2 vw left; Future  - XR humerus left; Future  - naproxen (Naprosyn) 500 mg tablet; Take 1 tablet (500 mg total) by mouth 2 (two) times a day with meals  Dispense: 60 tablet; Refill: 0  - Immob Shoulder Univeral    3   Contusion of left hip, initial encounter  Clinical impression is that patient's left hip injury is contusion as has no reproduction of pain and no subjective symptoms of pain on the left hip  Advised to continue icing elevation and rest   X-rays did not reveal any acute fractures  - XR hip/pelv 2-3 vws left if performed;  Future

## 2023-05-18 ENCOUNTER — OFFICE VISIT (OUTPATIENT)
Dept: OBGYN CLINIC | Facility: CLINIC | Age: 68
End: 2023-05-18

## 2023-05-18 VITALS
DIASTOLIC BLOOD PRESSURE: 85 MMHG | BODY MASS INDEX: 39.1 KG/M2 | HEIGHT: 69 IN | OXYGEN SATURATION: 98 % | WEIGHT: 264 LBS | HEART RATE: 73 BPM | RESPIRATION RATE: 18 BRPM | SYSTOLIC BLOOD PRESSURE: 126 MMHG

## 2023-05-18 DIAGNOSIS — S52.125A CLOSED NONDISPLACED FRACTURE OF HEAD OF LEFT RADIUS, INITIAL ENCOUNTER: Primary | ICD-10-CM

## 2023-05-18 NOTE — PROGRESS NOTES
"     Subjective:  Grace Champagne is a 70 y o  female presenting for 1 week follow-up visit in regards to left elbow pain  Patient was diagnosed with nondisplaced fracture of the left radial head  She was placed into a sling immobilizer at initial evaluation and advised to follow-up for reevaluation  She states that her pain symptoms have improved significantly  Currently rates the pain a 1-2 out of 10 in severity  She states that her motion has improved as well however she still has some limitations with extension and pronation supination  The following portions of the patient's history were reviewed and updated as appropriate: allergies, current medications, past family history, past medical history, past social history, past surgical history and problem list     Occupation:      Review of Systems   Constitutional: Negative for fever  Musculoskeletal: Positive for elbow pain  Neuro: Negative for numbness or tingling in arm       Objective:  /85   Pulse 73   Resp 18   Ht 5' 9\" (1 753 m)   Wt 120 kg (264 lb)   SpO2 98%   BMI 38 99 kg/m²     Skin: no rashes, lesions, skin discolorations, lacerations  Vasculature: normal radial and ulnar pulse, normal skin color, normal capillary refill in extremity, no upper extremity edema  Neurologic: Neurologic exam is normal throughout upper extremities, Awake, alert, and oriented x3, no apparent distress     Musculoskeletal:   Left elbow                                        Skin: normal                                            General Appearance: normal                                 Muscle Tone: normal                          Palpation-Tenderness:  Negative medial epicondyle/negative lateral epicondyle, negative flexor wad, positive radiocapitellar joint    Range of Motion  Flexion: Limited by about 10 degrees from full  Extension: Limited by approximately 5 degrees  Supination: Full  Pronation: Full      Hook test reveals intact biceps tendon     + " TTP proximal radius    Strength:    Biceps: normal  Triceps: normal        Neurovascular Exam:     Radial/Ulnar/Median Nerves: normal    Imaging:    No results found  Assessment/Plan:    1  Closed nondisplaced fracture of head of left radius, initial encounter  Patient exhibits improvement in terms of pain symptoms in regards to left radial head fracture  Radiographs from initial visit were reviewed once again and there was no displacement noted  She continues to have a little bit of limitations in terms of extension and flexion at the elbow which I believe is secondary to elbow stiffness and pain  I advised that we initiate with physical therapy to help with range of motion  We discussed following up once again for reevaluation in 4 to 5 weeks  If pain symptoms worsen before that visit advised to return sooner  She can transition out of the sling at this time  - Ambulatory Referral to Physical Therapy;  Future

## 2023-05-25 ENCOUNTER — EVALUATION (OUTPATIENT)
Dept: PHYSICAL THERAPY | Facility: CLINIC | Age: 68
End: 2023-05-25

## 2023-05-25 VITALS — SYSTOLIC BLOOD PRESSURE: 135 MMHG | HEART RATE: 67 BPM | DIASTOLIC BLOOD PRESSURE: 97 MMHG

## 2023-05-25 DIAGNOSIS — S52.125D CLOSED NONDISPLACED FRACTURE OF HEAD OF LEFT RADIUS WITH ROUTINE HEALING, SUBSEQUENT ENCOUNTER: ICD-10-CM

## 2023-05-25 NOTE — PROGRESS NOTES
PT Evaluation     Today's date: 2023  Patient name: Yolis Durham  : 1955  MRN: [id number]  Referring provider: Moon Kaplan DO  Dx:   Encounter Diagnosis     ICD-10-CM    1  Closed nondisplaced fracture of head of left radius with routine healing, subsequent encounter  S52 125D Ambulatory Referral to Physical Therapy                     Assessment  Assessment details: Pt is a 69 y/o female who presents to physical therapy with primary nociceptive pain s/p fall and subsequent L nondisplaced radial head fracture on 5/10/23  Pt does not present with any red flag symptoms at this time  She is doing well after this injury  Her pain is low and controlled as well as a good amount of elbow flexion and supination present  Discussed POC including AROM for the next 4 weeks in order to return elbow motion to full  Discussed importance of elbow extension and supination  Did discuss anatomy of the annular ligament as well with pt  Education provided prognosis and HEP, pt verablized understanding  Pt would benefit from skilled physical therapy in order to decrease deficits and return to prior level of function  Impairments: abnormal or restricted ROM, activity intolerance, impaired physical strength and pain with function    Symptom irritability: lowUnderstanding of Dx/Px/POC: good  Goals  STG (4 weeks):  Pt will be independent with HEP  Pt will demonstrate increase in flexion ROM by 3-5d  Pt will demonstrate increase in extension ROM by 5-10d  Pt will demonstrate increase  strength by 5# on the L      LTG (8 weeks): FOTO will be expected outcome  Pt will demonstrate ext ROM comparable to contralateral limb  Pt will demonstrate MMT grade comparable to contralateral limb in all deficient muscle groups  Pt will report ability to play piano without difficulty         Plan  Patient would benefit from: skilled physical therapy  Planned modality interventions: cryotherapy  Planned therapy interventions: manual therapy, neuromuscular re-education, patient education, self care, strengthening, stretching, therapeutic activities, therapeutic exercise and home exercise program  Frequency: 1-2x/week  Duration in weeks: 8  Treatment plan discussed with: patient        Subjective Evaluation    History of Present Illness  Mechanism of injury: Chief Complaint: Pt reports falling on 5/10/23 and landed hard on her L elbow and hip  She reports knowing that something was wrong with her L elbow at the time but was taking care of her grandson and decided to wait  She saw Dr Bindu Orta on 5/11/23 who radiographed her L hip shoulder and elbow  Significant in the L elbow for a nondisplaced radial head fracture  He put her in a sling for 1 week  She returned to Dr Bindu Orta who removed it and put in referral for PT  She reports everyday it gets better  Severity: low  Irritability: low (recent fracture though)  Nature: nociceptive  Stage: subacute  Stability: progressing    P1: L lateral elbow mainly on the volar side for the proximal 1/2, sore/tight, 0-0-2  Aggs/eases: supination/pronation, elbow flex/ext; ice, rest    Physical Activity: Was doing ashley prior to COVID, since she only walks every now and again at the grocery store  Nutrition: Has been tracking her macros including her carb intake on [employer]  Has been on weight watchers for awhile as well  GHS: None  Patient Goals  Patient goal: return to playing piano without difficulty, full ROM         Objective     Observations     Left Elbow   Positive for edema and effusion       Active Range of Motion     Left Elbow   Flexion: 132 degrees   Extension: 35 degrees     Right Elbow   Flexion: 145 degrees   Extension: 5 degrees     Passive Range of Motion     Additional Passive Range of Motion Details  Not tested due to recent fracture    Strength/Myotome Testing     Additional Strength Details  Did not test elbow strength due to recent fracture    Wrist:  R: 5/5 all directions  L: 4/5 all directions (none with increase in pain except wrist extension, did not push farther than 4/5 today due to recent fracture)    :  R: 55 3, 54 2  L: 35 0, 38 8             Precautions: HTN      Manuals 5/25            BP assessment Assess at next visit, if still high, check contra arm, if still high, recommendation she checks at home with BP cuff each morning                                                   Neuro Re-Ed                                                                                                        Ther Ex             Elbow AROM flex/ext             Putty gripping             Wrist flex             Wrist ext             Wrist RD/UD                                                    Ther Activity                                       Gait Training                                       Modalities

## 2023-06-07 DIAGNOSIS — S52.125A CLOSED NONDISPLACED FRACTURE OF HEAD OF LEFT RADIUS, INITIAL ENCOUNTER: ICD-10-CM

## 2023-06-07 RX ORDER — NAPROXEN 500 MG/1
TABLET ORAL
Qty: 60 TABLET | Refills: 0 | Status: SHIPPED | OUTPATIENT
Start: 2023-06-07

## 2023-06-09 ENCOUNTER — OFFICE VISIT (OUTPATIENT)
Dept: PHYSICAL THERAPY | Facility: CLINIC | Age: 68
End: 2023-06-09
Payer: MEDICARE

## 2023-06-09 DIAGNOSIS — S52.125D CLOSED NONDISPLACED FRACTURE OF HEAD OF LEFT RADIUS WITH ROUTINE HEALING, SUBSEQUENT ENCOUNTER: Primary | ICD-10-CM

## 2023-06-09 PROCEDURE — 97110 THERAPEUTIC EXERCISES: CPT | Performed by: PHYSICAL MEDICINE & REHABILITATION

## 2023-06-09 PROCEDURE — 97140 MANUAL THERAPY 1/> REGIONS: CPT | Performed by: PHYSICAL MEDICINE & REHABILITATION

## 2023-06-09 PROCEDURE — 97112 NEUROMUSCULAR REEDUCATION: CPT | Performed by: PHYSICAL MEDICINE & REHABILITATION

## 2023-06-09 NOTE — PROGRESS NOTES
Daily Note     Today's date: 2023  Patient name: Sera Lew  : 1955  MRN: [id number]  Referring provider: Yaz Alexandra DO  Dx:   Encounter Diagnosis     ICD-10-CM    1  Closed nondisplaced fracture of head of left radius with routine healing, subsequent encounter  S54 720D                    Subjective: Patient notes continued progress  No issues with HEP  Objective: See treatment diary below    Assessment: Tolerated treatment well  BP slightly elevated but WNL for patient  No pain with intervention as charted  Included STM this date secondary to reported discomfort and palpable tightness through biceps  Patient exhibited good technique with therapeutic exercises and would benefit from continued PT  Continue as able  Plan: Continue per plan of care        Precautions: HTN    Manuals            BP assessment Assess at next visit, if still high, check contra arm, if still high, recommendation she checks at home with BP cuff each morning 130/86 mmHg           L biceps [facility]                                     Neuro Re-Ed                                                                                                        Ther Ex             Elbow AROM flex/ext             Putty gripping  20x full, 10x individual           Wrist flex  20x           Wrist ext  20x           Wrist RD/UD  20x                                                  Ther Activity                                       Gait Training                                       Modalities

## 2023-06-15 ENCOUNTER — OFFICE VISIT (OUTPATIENT)
Dept: PHYSICAL THERAPY | Facility: CLINIC | Age: 68
End: 2023-06-15
Payer: MEDICARE

## 2023-06-15 DIAGNOSIS — S52.125D CLOSED NONDISPLACED FRACTURE OF HEAD OF LEFT RADIUS WITH ROUTINE HEALING, SUBSEQUENT ENCOUNTER: Primary | ICD-10-CM

## 2023-06-15 PROCEDURE — 97110 THERAPEUTIC EXERCISES: CPT | Performed by: PHYSICAL THERAPIST

## 2023-06-15 PROCEDURE — 97140 MANUAL THERAPY 1/> REGIONS: CPT | Performed by: PHYSICAL THERAPIST

## 2023-06-15 NOTE — PROGRESS NOTES
Daily Note     Today's date: 6/15/2023  Patient name: Jaydon Garza  : 1955  MRN: [id number]  Referring provider: Soni Mckenna DO  Dx:   Encounter Diagnosis     ICD-10-CM    1  Closed nondisplaced fracture of head of left radius with routine healing, subsequent encounter  S52 125D                      Subjective: Pt reports that her elbow is feeling good  She has been consistent with HEP  Objective: See treatment diary below      Assessment: Tolerated treatment well  No real soft tissue restriction noted today with STM  Therefore, most likely will defer in future visits  ROM improving significantly: flex 145, ext 15d  Educated on importance of home ROM for HEP and all other updates, pt verbalized understanding  Patient would benefit from continued PT      Plan: Continue per plan of care  Progress treatment as tolerated         Precautions: HTN    Manuals 5/25 6/9 6/15          BP assessment Assess at next visit, if still high, check contra arm, if still high, recommendation she checks at home with BP cuff each morning 130/86 mmHg           L biceps STM   AUSTEN                       Assessment of shoulder   AUSTEN          Neuro Re-Ed                                                                                                        Ther Ex             Elbow AROM flex/ext             Putty gripping  20x full, 10x individual Yellow putty 20x full, 10x individual          Wrist flex  20x 30x          Wrist ext  20x 30x          Wrist RD/UD  20x 30x                                                 Ther Activity                                       Gait Training                                       Modalities

## 2023-06-19 ENCOUNTER — OFFICE VISIT (OUTPATIENT)
Dept: OBGYN CLINIC | Facility: CLINIC | Age: 68
End: 2023-06-19
Payer: MEDICARE

## 2023-06-19 VITALS
HEIGHT: 69 IN | BODY MASS INDEX: 39.4 KG/M2 | SYSTOLIC BLOOD PRESSURE: 135 MMHG | WEIGHT: 266 LBS | RESPIRATION RATE: 18 BRPM | OXYGEN SATURATION: 97 % | DIASTOLIC BLOOD PRESSURE: 85 MMHG | HEART RATE: 71 BPM

## 2023-06-19 DIAGNOSIS — S52.125D CLOSED NONDISPLACED FRACTURE OF HEAD OF LEFT RADIUS WITH ROUTINE HEALING, SUBSEQUENT ENCOUNTER: Primary | ICD-10-CM

## 2023-06-19 DIAGNOSIS — M75.52 SUBACROMIAL BURSITIS OF LEFT SHOULDER JOINT: ICD-10-CM

## 2023-06-19 PROCEDURE — 99214 OFFICE O/P EST MOD 30 MIN: CPT | Performed by: FAMILY MEDICINE

## 2023-06-19 NOTE — PROGRESS NOTES
"     Subjective:  Matthew Morgan is a 70 y o  female   Presenting for follow-up visit in regards to left elbow pain  Patient was seen for closed fracture of the left radial head and injury that she had sustained approximately 6 weeks ago  Patient was referred to physical therapy and states that she has received significant improvement in terms of her motion and pain symptoms  She reports 0 out of 10 pain in office today  Her motion has improved significantly and she feels no limitations in terms of daily activity  She does report occasional left shoulder pain specifically with lifting heavy items overhead or cross body  The following portions of the patient's history were reviewed and updated as appropriate: allergies, current medications, past family history, past medical history, past social history, past surgical history and problem list     Occupation:      Review of Systems   Constitutional: Negative for fever  Musculoskeletal: Positive for elbow pain  Neuro: Negative for numbness or tingling in arm       Objective:  /85   Pulse 71   Resp 18   Ht 5' 9\" (1 753 m)   Wt 121 kg (266 lb)   SpO2 97%   BMI 39 28 kg/m²     Skin: no rashes, lesions, skin discolorations, lacerations  Vasculature: normal radial and ulnar pulse, normal skin color, normal capillary refill in extremity, no upper extremity edema  Neurologic: Neurologic exam is normal throughout upper extremities, Awake, alert, and oriented x3, no apparent distress     Musculoskeletal:   Left elbow                                        Skin: normal                                            General Appearance: normal                                 Muscle Tone: normal                          Palpation-Tenderness:  Negative medial epicondyle/negative lateral epicondyle, negative flexor wad, negative radiocapitellar joint    Range of Motion  Flexion: Full  Extension: Full  Supination: Full  Pronation: Full      Hook test reveals intact " biceps tendon     Negative TTP proximal radius    Strength:    Biceps: normal  Triceps: normal    Left shoulder  Positive Neer's positive [place]      Neurovascular Exam:     Radial/Ulnar/Median Nerves: normal    Imaging:    No results found  Assessment/Plan:    1  Subacromial bursitis of left shoulder joint  Advised to continue with physical therapy for subacromial bursitis  Her pain is intermittent in nature and specifically exacerbated when she has to lift items across body or overhead  Overall she is feels that she is managing it well and does not cause daily limitations  We discussed role for cortisone injection however she declines at today's visit  2  Closed nondisplaced fracture of head of left radius with routine healing, subsequent encounter  Overall patient has improved significantly in terms of motion and pain symptoms in regards to fracture of the left radial head  We discussed repeating radiograph to confirm healing of the fracture however given her significant improvements in terms of function and motion and pain we opted to hold on radiograph as she has shown clinical improvement  We discussed following up as needed for the left elbow and if symptoms recur we can have her follow-up for reevaluation

## 2023-06-22 ENCOUNTER — OFFICE VISIT (OUTPATIENT)
Dept: PHYSICAL THERAPY | Facility: CLINIC | Age: 68
End: 2023-06-22
Payer: MEDICARE

## 2023-06-22 DIAGNOSIS — S52.125D CLOSED NONDISPLACED FRACTURE OF HEAD OF LEFT RADIUS WITH ROUTINE HEALING, SUBSEQUENT ENCOUNTER: Primary | ICD-10-CM

## 2023-06-22 PROCEDURE — 97140 MANUAL THERAPY 1/> REGIONS: CPT | Performed by: PHYSICAL THERAPIST

## 2023-06-22 PROCEDURE — 97112 NEUROMUSCULAR REEDUCATION: CPT | Performed by: PHYSICAL THERAPIST

## 2023-06-22 NOTE — PROGRESS NOTES
"Daily Note     Today's date: 2023  Patient name: Joann Diaz  : 1955  MRN: [id number]  Referring provider: Azucena Juarez DO  Dx:   Encounter Diagnosis     ICD-10-CM    1  Closed nondisplaced fracture of head of left radius with routine healing, subsequent encounter  S58 337D                      Subjective: Pt reports that she is doing very well  However, she is noting some stiffness in the anterior compartment of the L forearm  Objective: See treatment diary below      Assessment: Tolerated treatment well  Wrist flexor compartment stretching did not change symptoms  Assessed neurodynamics, no change  STM crossfiber pronator resolved symptoms by 95% as reported by patient  Discussed with pt how to complete at home  Patient would benefit from continued PT      Plan: Continue per plan of care  Progress treatment as tolerated         Precautions: HTN    Manuals 5/25 6/9 6/15 6/22         BP assessment Assess at next visit, if still high, check contra arm, if still high, recommendation she checks at home with BP cuff each morning 130/86 mmHg           L biceps STM  LH AUSTEN          L pronator [facility]    AUSTEN         Assessment of shoulder   AUSTEN          Neuro Re-Ed             Assessment of ULNTA    AUSTEN         Median nerve slider    AUSTEN                                                                          Ther Ex             Elbow AROM flex/ext             Putty gripping  20x full, 10x individual Yellow putty 20x full, 10x individual          Wrist flex  20x 30x          Wrist ext  20x 30x          Wrist RD/UD  20x 30x          Wrist flexor stretch    3x30\"                      Pt edu    AUSTEN         Ther Activity                                       Gait Training                                       Modalities                                            "

## 2023-06-29 ENCOUNTER — OFFICE VISIT (OUTPATIENT)
Dept: PHYSICAL THERAPY | Facility: CLINIC | Age: 68
End: 2023-06-29
Payer: MEDICARE

## 2023-06-29 DIAGNOSIS — S52.125D CLOSED NONDISPLACED FRACTURE OF HEAD OF LEFT RADIUS WITH ROUTINE HEALING, SUBSEQUENT ENCOUNTER: Primary | ICD-10-CM

## 2023-06-29 PROCEDURE — 97110 THERAPEUTIC EXERCISES: CPT | Performed by: PHYSICAL THERAPIST

## 2023-06-29 NOTE — PROGRESS NOTES
PT Discharge     Today's date: 2023  Patient name: Tory Charles  : 1955  MRN: [id number]  Referring provider: Nate Shrestha DO  Dx:   Encounter Diagnosis     ICD-10-CM    1  Closed nondisplaced fracture of head of left radius with routine healing, subsequent encounter  S52 125D Ambulatory Referral to Physical Therapy                     Assessment  Assessment details: Pt is a 71 y/o female who presents to physical therapy with primary nociceptive pain s/p fall and subsequent L nondisplaced radial head fracture on 5/10/23  Pt has made significant improvement  She no longer has any difficulty with any functional activities  ROM and MMT normal  PT and pt discussed d/c today, pt agreeable  Impairments: abnormal or restricted ROM, activity intolerance, impaired physical strength and pain with function    Symptom irritability: lowUnderstanding of Dx/Px/POC: good  Goals  STG (4 weeks): - MET  Pt will be independent with HEP  Pt will demonstrate increase in flexion ROM by 3-5d  Pt will demonstrate increase in extension ROM by 5-10d  Pt will demonstrate increase  strength by 5# on the L      LTG (8 weeks): - MET  FOTO will be expected outcome  Pt will demonstrate ext ROM comparable to contralateral limb  Pt will demonstrate MMT grade comparable to contralateral limb in all deficient muscle groups  Pt will report ability to play piano without difficulty  Plan  Patient would benefit from: skilled physical therapy  Planned modality interventions: cryotherapy  Planned therapy interventions: manual therapy, neuromuscular re-education, patient education, self care, strengthening, stretching, therapeutic activities, therapeutic exercise and home exercise program  Frequency: 1-2x/week    Duration in weeks: 8  Treatment plan discussed with: patient        Subjective Evaluation    History of Present Illness  Mechanism of injury: Chief Complaint: Pt reports falling on 5/10/23 and landed hard on her L elbow and hip  She reports knowing that something was wrong with her L elbow at the time but was taking care of her grandson and decided to wait  She saw Dr Zahida Pate on 5/11/23 who radiographed her L hip shoulder and elbow  Significant in the L elbow for a nondisplaced radial head fracture  He put her in a sling for 1 week  She returned to Dr Zahida Pate who removed it and put in referral for PT  She reports everyday it gets better  D/c: Pt reports she has no difficulty with her elbow at this time  She has minimal to no pain and states that she is able to do all her functional activities without difficulty  Severity: low  Irritability: low (recent fracture though)  Nature: nociceptive  Stage: subacute  Stability: progressing    P1: L lateral elbow mainly on the volar side for the proximal 1/2, sore/tight, 0-0-0  Aggs/eases: supination/pronation, elbow flex/ext; ice, rest    Physical Activity: Was doing ashley prior to COVID, since she only walks every now and again at the grocery store  Nutrition: Has been tracking her macros including her carb intake on [employer]  Has been on weight watchers for awhile as well  GHS: None  Patient Goals  Patient goal: return to playing piano without difficulty, full ROM         Objective     Observations     Left Elbow   Positive for edema and effusion       Active Range of Motion     Left Elbow   Flexion: 145 degrees   Extension: 5 degrees     Right Elbow   Flexion: 145 degrees   Extension: 5 degrees     Passive Range of Motion     Additional Passive Range of Motion Details  Not tested due to recent fracture    Strength/Myotome Testing   Elbow strength WNL    Wrist:  R: 5/5 all directions  L: 5/5 all directions    :  R: 55 3, 54 2  L: 35 0, 38 8             Precautions: HTN         Manuals 5/25 6/9 6/15 6/22 6/29        BP assessment Assess at next visit, if still high, check contra arm, if still high, recommendation she checks at home with BP cuff each morning 130/86 mmHg "       L biceps [facility]          L pronator [facility]         Assessment of shoulder   AUSTEN          Neuro Re-Ed             Assessment of ULNTA    AUSTEN         Median nerve slider    AUSTEN                                                                          Ther Ex  6/9           Elbow AROM flex/ext             Putty gripping  20x full, 10x individual Yellow putty 20x full, 10x individual          Wrist flex  20x 30x          Wrist ext  20x 30x          Wrist RD/UD  20x 30x          Wrist flexor stretch    3x30\"                      Pt edu    AUSTEN         Ther Activity                                       Gait Training                                       Modalities                                         "

## 2023-08-14 ENCOUNTER — APPOINTMENT (OUTPATIENT)
Dept: LAB | Facility: CLINIC | Age: 68
End: 2023-08-14
Payer: MEDICARE

## 2023-08-14 DIAGNOSIS — E11.9 TYPE 2 DIABETES MELLITUS WITHOUT COMPLICATION, WITHOUT LONG-TERM CURRENT USE OF INSULIN (HCC): ICD-10-CM

## 2023-08-14 LAB
ALBUMIN SERPL BCP-MCNC: 3.8 G/DL (ref 3.5–5)
ALP SERPL-CCNC: 63 U/L (ref 46–116)
ALT SERPL W P-5'-P-CCNC: 30 U/L (ref 12–78)
ANION GAP SERPL CALCULATED.3IONS-SCNC: 5 MMOL/L
AST SERPL W P-5'-P-CCNC: 16 U/L (ref 5–45)
BASOPHILS # BLD AUTO: 0.05 THOUSANDS/ÂΜL (ref 0–0.1)
BASOPHILS NFR BLD AUTO: 1 % (ref 0–1)
BILIRUB SERPL-MCNC: 1.69 MG/DL (ref 0.2–1)
BUN SERPL-MCNC: 17 MG/DL (ref 5–25)
CALCIUM SERPL-MCNC: 9.7 MG/DL (ref 8.3–10.1)
CHLORIDE SERPL-SCNC: 104 MMOL/L (ref 96–108)
CHOLEST SERPL-MCNC: 131 MG/DL
CO2 SERPL-SCNC: 28 MMOL/L (ref 21–32)
CREAT SERPL-MCNC: 0.46 MG/DL (ref 0.6–1.3)
CREAT UR-MCNC: 53.2 MG/DL
EOSINOPHIL # BLD AUTO: 0.11 THOUSAND/ÂΜL (ref 0–0.61)
EOSINOPHIL NFR BLD AUTO: 2 % (ref 0–6)
ERYTHROCYTE [DISTWIDTH] IN BLOOD BY AUTOMATED COUNT: 13.3 % (ref 11.6–15.1)
EST. AVERAGE GLUCOSE BLD GHB EST-MCNC: 157 MG/DL
GFR SERPL CREATININE-BSD FRML MDRD: 101 ML/MIN/1.73SQ M
GLUCOSE P FAST SERPL-MCNC: 160 MG/DL (ref 65–99)
HBA1C MFR BLD: 7.1 %
HCT VFR BLD AUTO: 43.1 % (ref 34.8–46.1)
HDLC SERPL-MCNC: 35 MG/DL
HGB BLD-MCNC: 14.6 G/DL (ref 11.5–15.4)
IMM GRANULOCYTES # BLD AUTO: 0.02 THOUSAND/UL (ref 0–0.2)
IMM GRANULOCYTES NFR BLD AUTO: 0 % (ref 0–2)
LDLC SERPL CALC-MCNC: 41 MG/DL (ref 0–100)
LYMPHOCYTES # BLD AUTO: 2.43 THOUSANDS/ÂΜL (ref 0.6–4.47)
LYMPHOCYTES NFR BLD AUTO: 37 % (ref 14–44)
MCH RBC QN AUTO: 29.8 PG (ref 26.8–34.3)
MCHC RBC AUTO-ENTMCNC: 33.9 G/DL (ref 31.4–37.4)
MCV RBC AUTO: 88 FL (ref 82–98)
MICROALBUMIN UR-MCNC: 13.3 MG/L (ref 0–20)
MICROALBUMIN/CREAT 24H UR: 25 MG/G CREATININE (ref 0–30)
MONOCYTES # BLD AUTO: 0.49 THOUSAND/ÂΜL (ref 0.17–1.22)
MONOCYTES NFR BLD AUTO: 8 % (ref 4–12)
NEUTROPHILS # BLD AUTO: 3.42 THOUSANDS/ÂΜL (ref 1.85–7.62)
NEUTS SEG NFR BLD AUTO: 52 % (ref 43–75)
NONHDLC SERPL-MCNC: 96 MG/DL
NRBC BLD AUTO-RTO: 0 /100 WBCS
PLATELET # BLD AUTO: 188 THOUSANDS/UL (ref 149–390)
PMV BLD AUTO: 12.4 FL (ref 8.9–12.7)
POTASSIUM SERPL-SCNC: 3.9 MMOL/L (ref 3.5–5.3)
PROT SERPL-MCNC: 7.1 G/DL (ref 6.4–8.4)
RBC # BLD AUTO: 4.9 MILLION/UL (ref 3.81–5.12)
SODIUM SERPL-SCNC: 137 MMOL/L (ref 135–147)
TRIGL SERPL-MCNC: 277 MG/DL
WBC # BLD AUTO: 6.52 THOUSAND/UL (ref 4.31–10.16)

## 2023-08-14 PROCEDURE — 82570 ASSAY OF URINE CREATININE: CPT

## 2023-08-14 PROCEDURE — 85025 COMPLETE CBC W/AUTO DIFF WBC: CPT

## 2023-08-14 PROCEDURE — 80061 LIPID PANEL: CPT

## 2023-08-14 PROCEDURE — 80053 COMPREHEN METABOLIC PANEL: CPT

## 2023-08-14 PROCEDURE — 82043 UR ALBUMIN QUANTITATIVE: CPT

## 2023-08-14 PROCEDURE — 36415 COLL VENOUS BLD VENIPUNCTURE: CPT

## 2023-08-14 PROCEDURE — 83036 HEMOGLOBIN GLYCOSYLATED A1C: CPT

## 2023-08-21 ENCOUNTER — OFFICE VISIT (OUTPATIENT)
Dept: INTERNAL MEDICINE CLINIC | Facility: CLINIC | Age: 68
End: 2023-08-21
Payer: MEDICARE

## 2023-08-21 VITALS
SYSTOLIC BLOOD PRESSURE: 132 MMHG | HEIGHT: 69 IN | BODY MASS INDEX: 38.98 KG/M2 | WEIGHT: 263.2 LBS | TEMPERATURE: 97.3 F | HEART RATE: 71 BPM | DIASTOLIC BLOOD PRESSURE: 88 MMHG | RESPIRATION RATE: 18 BRPM | OXYGEN SATURATION: 95 %

## 2023-08-21 DIAGNOSIS — Z12.31 SCREENING MAMMOGRAM FOR BREAST CANCER: ICD-10-CM

## 2023-08-21 DIAGNOSIS — G47.9 SLEEP DISTURBANCE: ICD-10-CM

## 2023-08-21 DIAGNOSIS — E78.2 MIXED HYPERLIPIDEMIA: ICD-10-CM

## 2023-08-21 DIAGNOSIS — E11.9 TYPE 2 DIABETES MELLITUS WITHOUT COMPLICATION, WITHOUT LONG-TERM CURRENT USE OF INSULIN (HCC): ICD-10-CM

## 2023-08-21 DIAGNOSIS — I10 BENIGN ESSENTIAL HYPERTENSION: ICD-10-CM

## 2023-08-21 DIAGNOSIS — Z00.00 MEDICARE ANNUAL WELLNESS VISIT, SUBSEQUENT: Primary | ICD-10-CM

## 2023-08-21 PROCEDURE — G0439 PPPS, SUBSEQ VISIT: HCPCS | Performed by: INTERNAL MEDICINE

## 2023-08-21 PROCEDURE — 99214 OFFICE O/P EST MOD 30 MIN: CPT | Performed by: INTERNAL MEDICINE

## 2023-08-21 RX ORDER — METFORMIN HYDROCHLORIDE 500 MG/1
500 TABLET, EXTENDED RELEASE ORAL
Qty: 90 TABLET | Refills: 1 | Status: SHIPPED | OUTPATIENT
Start: 2023-08-21 | End: 2024-02-17

## 2023-08-21 NOTE — PROGRESS NOTES
Assessment and Plan:     Problem List Items Addressed This Visit        Endocrine    Type 2 diabetes mellitus without complication, without long-term current use of insulin (HCC)    Relevant Medications    metFORMIN (GLUCOPHAGE-XR) 500 mg 24 hr tablet    Other Relevant Orders    Ambulatory referral to Diabetic Education - use to refer for diabetes group classes, individual diabetes education, medical nutrition therapy, device training    Comprehensive metabolic panel    Hemoglobin A1C       Cardiovascular and Mediastinum    Benign essential hypertension       Other    Mixed hyperlipidemia   Other Visit Diagnoses     Medicare annual wellness visit, subsequent    -  Primary    Screening mammogram for breast cancer        Relevant Orders    Mammo screening bilateral w 3d & cad    Sleep disturbance        Relevant Orders    Ambulatory Referral to Sleep Medicine      For worsening diabetes, we will start her on for any perceived side effects. Recheck A1c and CMP in 1 month on the medicine. For her chronic stable problem of hypertension, continue atenolol 50 mg daily, losartan/HCTZ 1 tablet daily. For her chronic mixed hyperlipidemia, continue simvastatin 40 mg daily. For her ongoing sleep issues, will refer to sleep medicine for possible sleep apnea. Preventive health issues were discussed with patient, and age appropriate screening tests were ordered as noted in patient's After Visit Summary. Personalized health advice and appropriate referrals for health education or preventive services given if needed, as noted in patient's After Visit Summary. History of Present Illness:     Patient presents for a Medicare Wellness Visit    Patient comes in today for routine follow-up and Medicare wellness. Her sugars have gone up again. She feels she is following her diet. But she would be willing to see a nutritionist.  She is exercising.   She joined the gym after she found out that her Silver sneakers covers that.  She has lost a little weight but not a lot. Struggling with that. Her blood pressure is controlled. Cholesterol is controlled. Taking her medicines as directed. She also reports that her family is telling her that she is snoring at night. Concern for sleep apnea. But she is not sure she wants to go for sleep testing. Patient Care Team:  Josue Lewis MD as PCP - Kaia Sawyer MD as Endoscopist     Review of Systems:     Review of Systems   Respiratory: Negative for shortness of breath. Cardiovascular: Negative for chest pain. Gastrointestinal: Negative for abdominal pain. Problem List:     Patient Active Problem List   Diagnosis   • Arthritis   • Benign essential hypertension   • Detached retina, left   • Mixed hyperlipidemia   • Type 2 diabetes mellitus without complication, without long-term current use of insulin (HCC)   • Varicose veins of lower extremity   • Thyroid nodule   • Obesity, morbid ([address])   • Fall   • Closed nondisplaced fracture of head of left radius      Past Medical and Surgical History:     Past Medical History:   Diagnosis Date   • Arthritis    • Cataract     07/16/2017   • Detached retina     left   • Hyperlipidemia    • Hypertension    • Mass of arm     left arm    • Obesity    • Visual impairment      Past Surgical History:   Procedure Laterality Date   • CARPAL TUNNEL RELEASE Left    • CATARACT EXTRACTION Left    • COLONOSCOPY     • EYE SURGERY     • MASS EXCISION Left 1/24/2022    Procedure: EXCISION BIOPSY TISSUE LESION/MASS UPPER EXTREMITY;  Surgeon: Swapna Obrien DO;  Location: [facility];  Service: General   • AZ COLONOSCOPY FLX DX W/COLLJ SPEC WHEN PFRMD N/A 8/29/2017    Procedure: COLONOSCOPY;  Surgeon: Narciso Villatoro MD;  Location: [facility];   Service: Gastroenterology   • RETINAL DETACHMENT SURGERY Left       Family History:     Family History   Problem Relation Age of Onset   • Coronary artery disease Mother    • Diabetes Mother • COPD Father    • Coronary artery disease Brother    • Valvular heart disease Brother    • Cancer Daughter       Social History:     Social History     Socioeconomic History   • Marital status:      Spouse name: None   • Number of children: 2   • Years of education: None   • Highest education level: None   Occupational History   • Occupation:       Comment: retired    Tobacco Use   • Smoking status: Never   • Smokeless tobacco: Never   Vaping Use   • Vaping Use: Never used   Substance and Sexual Activity   • Alcohol use: Not Currently     Comment: RARELY,   • Drug use: No   • Sexual activity: Not Currently     Partners: Male     Birth control/protection: None   Other Topics Concern   • None   Social History Narrative    Brushes teeth twice a day    Dental care    Engages in instrumental music     Exercise: Yard work     Exercises reg,      Social Determinants of Health     Financial Resource Strain: Low Risk  (8/20/2023)    Overall Financial Resource Strain (CARDIA)    • Difficulty of Paying Living Expenses: Not hard at all   Food Insecurity: Not on file   Transportation Needs: No Transportation Needs (8/20/2023)    PRAPARE - Transportation    • Lack of Transportation (Medical): No    • Lack of Transportation (Non-Medical):  No   Physical Activity: Not on file   Stress: Not on file   Social Connections: Not on file   Intimate Partner Violence: Not on file   Housing Stability: Not on file      Medications and Allergies:     Current Outpatient Medications   Medication Sig Dispense Refill   • Ascorbic Acid (VITAMIN C PO) Take by mouth     • atenolol (TENORMIN) 50 mg tablet Take 2 tablets (100 mg total) by mouth daily at bedtime 180 tablet 1   • cholecalciferol (VITAMIN D3) 1,000 units tablet Take 1,000 Units by mouth daily at bedtime     • losartan-hydrochlorothiazide (HYZAAR) 100-25 MG per tablet Take 1 tablet by mouth daily 90 tablet 1   • metFORMIN (GLUCOPHAGE-XR) 500 mg 24 hr tablet Take 1 tablet (500 mg total) by mouth daily with dinner 90 tablet 1   • methenamine hippurate (HIPREX) 1 g tablet Take 1 g by mouth daily     • multivitamin (THERAGRAN) TABS Take 1 tablet by mouth daily     • prednisoLONE acetate (PRED FORTE) 1 % ophthalmic suspension daily  1   • simvastatin (ZOCOR) 40 mg tablet Take 1 tablet (40 mg total) by mouth daily at bedtime 90 tablet 1   • vitamin B-12 (VITAMIN B-12) 1,000 mcg tablet Take 1,000 mcg by mouth daily       No current facility-administered medications for this visit. Allergies   Allergen Reactions   • Ciprofloxacin Other (See Comments)     Possible detached retina  Possible detached retina      Immunizations:     Immunization History   Administered Date(s) Administered   • COVID-19 MODERNA VACC 0.5 ML IM 01/27/2021, 01/27/2021, 02/24/2021, 02/24/2021, 10/26/2021, 07/14/2022   • COVID-19 Moderna Vac BIVALENT 12 Yr+ IM (BOOSTER ONLY) 0.5 ML 10/26/2022   • INFLUENZA 11/11/2010, 10/31/2011, 01/16/2014, 10/23/2021, 10/03/2022   • Influenza Quadrivalent, 6-35 Months IM 10/30/2017   • Influenza Split High Dose Preservative Free IM 12/06/2016   • Influenza, high dose seasonal 0.7 mL 09/23/2019, 10/08/2020, 10/28/2022   • Influenza, recombinant, quadrivalent,injectable, preservative free 09/20/2018   • MMR 05/09/2019   • Pneumococcal Conjugate 13-Valent 09/23/2019   • Pneumococcal Polysaccharide PPV23 11/11/2010, 10/08/2020, 12/10/2021   • Tdap 12/06/2016   • Zoster Vaccine Recombinant 11/24/2020, 03/23/2021      Health Maintenance:         Topic Date Due   • Breast Cancer Screening: Mammogram  09/06/2023   • Colorectal Cancer Screening  09/19/2027   • Hepatitis C Screening  Completed         Topic Date Due   • COVID-19 Vaccine (7 - Moderna series) 02/26/2023   • Influenza Vaccine (1) 09/01/2023      Medicare Screening Tests and Risk Assessments:     Sharyn Rao is here for her Subsequent Wellness visit. Health Risk Assessment:   Patient rates overall health as good. Patient feels that their physical health rating is same. Patient is very satisfied with their life. Eyesight was rated as same. Hearing was rated as same. Patient feels that their emotional and mental health rating is same. Patients states they are never, rarely angry. Patient states they are never, rarely unusually tired/fatigued. Pain experienced in the last 7 days has been none. Patient states that she has experienced no weight loss or gain in last 6 months. Depression Screening:   PHQ-2 Score: 0      Fall Risk Screening: In the past year, patient has experienced: history of falling in past year    Number of falls: 1  Injured during fall?: No    Feels unsteady when standing or walking?: No    Worried about falling?: No      Urinary Incontinence Screening:   Patient has not leaked urine accidently in the last six months. Home Safety:  Patient does not have trouble with stairs inside or outside of their home. Patient has working smoke alarms and has working carbon monoxide detector. Home safety hazards include: none. Nutrition:   Current diet is Low Cholesterol, Low Saturated Fat, Low Carb and No Added Salt. Medications:   Patient is currently taking over-the-counter supplements. OTC medications include: Multi vitamin, D-3, C, B12. Patient is able to manage medications. Activities of Daily Living (ADLs)/Instrumental Activities of Daily Living (IADLs):   Walk and transfer into and out of bed and chair?: Yes  Dress and groom yourself?: Yes    Bathe or shower yourself?: Yes    Feed yourself? Yes  Do your laundry/housekeeping?: Yes  Manage your money, pay your bills and track your expenses?: Yes  Make your own meals?: Yes    Do your own shopping?: Yes    Previous Hospitalizations:   Any hospitalizations or ED visits within the last 12 months?: No      Advance Care Planning:   Living will: Yes    Durable POA for healthcare: Yes    Advanced directive: Yes    Advanced directive counseling given:  Yes Cognitive Screening:   Provider or family/friend/caregiver concerned regarding cognition?: No    PREVENTIVE SCREENINGS      Cardiovascular Screening:    General: Screening Not Indicated and History Lipid Disorder      Diabetes Screening:     General: Screening Not Indicated and History Diabetes      Colorectal Cancer Screening:     General: Screening Current      Breast Cancer Screening:     General: Screening Current      Cervical Cancer Screening:    General: Screening Not Indicated      Osteoporosis Screening:    General: Screening Current      Abdominal Aortic Aneurysm (AAA) Screening:        General: Screening Not Indicated      Lung Cancer Screening:     General: Screening Not Indicated      Hepatitis C Screening:    General: Screening Current    Screening, Brief Intervention, and Referral to Treatment (SBIRT)    Screening  Typical number of drinks in a day: 0  Typical number of drinks in a week: 0  Interpretation: Low risk drinking behavior. AUDIT-C Screenin) How often did you have a drink containing alcohol in the past year? never  2) How many drinks did you have on a typical day when you were drinking in the past year? 0  3) How often did you have 6 or more drinks on one occasion in the past year? never    AUDIT-C Score: 0  Interpretation: Score 0-2 (female): Negative screen for alcohol misuse    Single Item Drug Screening:  How often have you used an illegal drug (including marijuana) or a prescription medication for non-medical reasons in the past year? never    Single Item Drug Screen Score: 0  Interpretation: Negative screen for possible drug use disorder    Brief Intervention  Alcohol & drug use screenings were reviewed. No concerns regarding substance use disorder identified. No results found.      Physical Exam:     /88 (BP Location: Left arm, Patient Position: Sitting, Cuff Size: Large)   Pulse 71   Temp (!) 97.3 °F (36.3 °C) (Tympanic)   Resp 18   Ht 5' 9" (1.753 m)   Wt 119 kg (263 lb 3.2 oz)   SpO2 95%   BMI 38.87 kg/m²     Physical Exam  Vitals and nursing note reviewed. Constitutional:       Appearance: She is well-developed. Cardiovascular:      Rate and Rhythm: Normal rate and regular rhythm. Pulmonary:      Effort: Pulmonary effort is normal.      Breath sounds: Normal breath sounds. Abdominal:      General: Abdomen is flat. Tenderness: There is no abdominal tenderness. Musculoskeletal:      Right lower leg: No edema. Left lower leg: No edema. Neurological:      Mental Status: She is alert and oriented to person, place, and time. Psychiatric:         Behavior: Behavior normal.         Thought Content:  Thought content normal.         Judgment: Judgment normal.          Sylvie De La Torre MD

## 2023-08-21 NOTE — PATIENT INSTRUCTIONS
Medicare Preventive Visit Patient Instructions  Thank you for completing your Welcome to Medicare Visit or Medicare Annual Wellness Visit today. Your next wellness visit will be due in one year (8/21/2024). The screening/preventive services that you may require over the next 5-10 years are detailed below. Some tests may not apply to you based off risk factors and/or age. Screening tests ordered at today's visit but not completed yet may show as past due. Also, please note that scanned in results may not display below. Preventive Screenings:  Service Recommendations Previous Testing/Comments   Colorectal Cancer Screening  * Colonoscopy    * Fecal Occult Blood Test (FOBT)/Fecal Immunochemical Test (FIT)  * Fecal DNA/Cologuard Test  * Flexible Sigmoidoscopy Age: 43-73 years old   Colonoscopy: every 10 years (may be performed more frequently if at higher risk)  OR  FOBT/FIT: every 1 year  OR  Cologuard: every 3 years  OR  Sigmoidoscopy: every 5 years  Screening may be recommended earlier than age 39 if at higher risk for colorectal cancer. Also, an individualized decision between you and your healthcare provider will decide whether screening between the ages of 77-80 would be appropriate. Colonoscopy: 09/20/2022  FOBT/FIT: Not on file  Cologuard: Not on file  Sigmoidoscopy: Not on file          Breast Cancer Screening Age: 36 years old  Frequency: every 1-2 years  Not required if history of left and right mastectomy Mammogram: 09/06/2022        Cervical Cancer Screening Between the ages of 21-29, pap smear recommended once every 3 years. Between the ages of 32-69, can perform pap smear with HPV co-testing every 5 years.    Recommendations may differ for women with a history of total hysterectomy, cervical cancer, or abnormal pap smears in past. Pap Smear: 08/31/2018        Hepatitis C Screening Once for adults born between 1945 and 1965  More frequently in patients at high risk for Hepatitis C Hep C Antibody: 05/02/2019        Diabetes Screening 1-2 times per year if you're at risk for diabetes or have pre-diabetes Fasting glucose: 160 mg/dL (8/14/2023)  A1C: 7.1 % (8/14/2023)      Cholesterol Screening Once every 5 years if you don't have a lipid disorder. May order more often based on risk factors. Lipid panel: 08/14/2023          Other Preventive Screenings Covered by Medicare:  1. Abdominal Aortic Aneurysm (AAA) Screening: covered once if your at risk. You're considered to be at risk if you have a family history of AAA. 2. Lung Cancer Screening: covers low dose CT scan once per year if you meet all of the following conditions: (1) Age 48-67; (2) No signs or symptoms of lung cancer; (3) Current smoker or have quit smoking within the last 15 years; (4) You have a tobacco smoking history of at least 20 pack years (packs per day multiplied by number of years you smoked); (5) You get a written order from a healthcare provider. 3. Glaucoma Screening: covered annually if you're considered high risk: (1) You have diabetes OR (2) Family history of glaucoma OR (3)  aged 48 and older OR (3)  American aged 72 and older  3. Osteoporosis Screening: covered every 2 years if you meet one of the following conditions: (1) You're estrogen deficient and at risk for osteoporosis based off medical history and other findings; (2) Have a vertebral abnormality; (3) On glucocorticoid therapy for more than 3 months; (4) Have primary hyperparathyroidism; (5) On osteoporosis medications and need to assess response to drug therapy. · Last bone density test (DXA Scan): 10/05/2022.  5. HIV Screening: covered annually if you're between the age of 15-65. Also covered annually if you are younger than 13 and older than 72 with risk factors for HIV infection. For pregnant patients, it is covered up to 3 times per pregnancy.     Immunizations:  Immunization Recommendations   Influenza Vaccine Annual influenza vaccination during flu season is recommended for all persons aged >= 6 months who do not have contraindications   Pneumococcal Vaccine   * Pneumococcal conjugate vaccine = PCV13 (Prevnar 13), PCV15 (Vaxneuvance), PCV20 (Prevnar 20)  * Pneumococcal polysaccharide vaccine = PPSV23 (Pneumovax) Adults 20-63 years old: 1-3 doses may be recommended based on certain risk factors  Adults 72 years old: 1-2 doses may be recommended based off what pneumonia vaccine you previously received   Hepatitis B Vaccine 3 dose series if at intermediate or high risk (ex: diabetes, end stage renal disease, liver disease)   Tetanus (Td) Vaccine - COST NOT COVERED BY MEDICARE PART B Following completion of primary series, a booster dose should be given every 10 years to maintain immunity against tetanus. Td may also be given as tetanus wound prophylaxis. Tdap Vaccine - COST NOT COVERED BY MEDICARE PART B Recommended at least once for all adults. For pregnant patients, recommended with each pregnancy. Shingles Vaccine (Shingrix) - COST NOT COVERED BY MEDICARE PART B  2 shot series recommended in those aged 48 and above     Health Maintenance Due:      Topic Date Due   • Breast Cancer Screening: Mammogram  09/06/2023   • Colorectal Cancer Screening  09/19/2027   • Hepatitis C Screening  Completed     Immunizations Due:      Topic Date Due   • COVID-19 Vaccine (7 - Moderna series) 02/26/2023   • Influenza Vaccine (1) 09/01/2023     Advance Directives   What are advance directives? Advance directives are legal documents that state your wishes and plans for medical care. These plans are made ahead of time in case you lose your ability to make decisions for yourself. Advance directives can apply to any medical decision, such as the treatments you want, and if you want to donate organs. What are the types of advance directives? There are many types of advance directives, and each state has rules about how to use them.  You may choose a combination of any of the following:  · Living will: This is a written record of the treatment you want. You can also choose which treatments you do not want, which to limit, and which to stop at a certain time. This includes surgery, medicine, IV fluid, and tube feedings. · Durable power of  for healthcare [facility]): This is a written record that states who you want to make healthcare choices for you when you are unable to make them for yourself. This person, called a proxy, is usually a family member or a friend. You may choose more than 1 proxy. · Do not resuscitate (DNR) order:  A DNR order is used in case your heart stops beating or you stop breathing. It is a request not to have certain forms of treatment, such as CPR. A DNR order may be included in other types of advance directives. · Medical directive: This covers the care that you want if you are in a coma, near death, or unable to make decisions for yourself. You can list the treatments you want for each condition. Treatment may include pain medicine, surgery, blood transfusions, dialysis, IV or tube feedings, and a ventilator (breathing machine). · Values history: This document has questions about your views, beliefs, and how you feel and think about life. This information can help others choose the care that you would choose. Why are advance directives important? An advance directive helps you control your care. Although spoken wishes may be used, it is better to have your wishes written down. Spoken wishes can be misunderstood, or not followed. Treatments may be given even if you do not want them. An advance directive may make it easier for your family to make difficult choices about your care. Weight Management   Why it is important to manage your weight:  Being overweight increases your risk of health conditions such as heart disease, high blood pressure, type 2 diabetes, and certain types of cancer.  It can also increase your risk for osteoarthritis, sleep apnea, and other respiratory problems. Aim for a slow, steady weight loss. Even a small amount of weight loss can lower your risk of health problems. How to lose weight safely:  A safe and healthy way to lose weight is to eat fewer calories and get regular exercise. You can lose up about 1 pound a week by decreasing the number of calories you eat by 500 calories each day. Healthy meal plan for weight management:  A healthy meal plan includes a variety of foods, contains fewer calories, and helps you stay healthy. A healthy meal plan includes the following:  · Eat whole-grain foods more often. A healthy meal plan should contain fiber. Fiber is the part of grains, fruits, and vegetables that is not broken down by your body. Whole-grain foods are healthy and provide extra fiber in your diet. Some examples of whole-grain foods are whole-wheat breads and pastas, oatmeal, brown rice, and bulgur. · Eat a variety of vegetables every day. Include dark, leafy greens such as spinach, kale, piero greens, and mustard greens. Eat yellow and orange vegetables such as carrots, sweet potatoes, and winter squash. · Eat a variety of fruits every day. Choose fresh or canned fruit (canned in its own juice or light syrup) instead of juice. Fruit juice has very little or no fiber. · Eat low-fat dairy foods. Drink fat-free (skim) milk or 1% milk. Eat fat-free yogurt and low-fat cottage cheese. Try low-fat cheeses such as mozzarella and other reduced-fat cheeses. · Choose meat and other protein foods that are low in fat. Choose beans or other legumes such as split peas or lentils. Choose fish, skinless poultry (chicken or turkey), or lean cuts of red meat (beef or pork). Before you cook meat or poultry, cut off any visible fat. · Use less fat and oil. Try baking foods instead of frying them. Add less fat, such as margarine, sour cream, regular salad dressing and mayonnaise to foods. Eat fewer high-fat foods.  Some examples of high-fat foods include french fries, doughnuts, ice cream, and cakes. · Eat fewer sweets. Limit foods and drinks that are high in sugar. This includes candy, cookies, regular soda, and sweetened drinks. Exercise:  Exercise at least 30 minutes per day on most days of the week. Some examples of exercise include walking, biking, dancing, and swimming. You can also fit in more physical activity by taking the stairs instead of the elevator or parking farther away from stores. Ask your healthcare provider about the best exercise plan for you. © Copyright [employer] 2018 Information is for End User's use only and may not be sold, redistributed or otherwise used for commercial purposes.  All illustrations and images included in CareNotes® are the copyrighted property of [employer] or [address] [place] [address]

## 2023-08-24 ENCOUNTER — TELEMEDICINE (OUTPATIENT)
Dept: DIABETES SERVICES | Facility: CLINIC | Age: 68
End: 2023-08-24
Payer: MEDICARE

## 2023-08-24 VITALS — WEIGHT: 263 LBS | BODY MASS INDEX: 38.84 KG/M2

## 2023-08-24 DIAGNOSIS — E11.9 TYPE 2 DIABETES MELLITUS WITHOUT COMPLICATION, WITHOUT LONG-TERM CURRENT USE OF INSULIN (HCC): Primary | ICD-10-CM

## 2023-08-24 PROCEDURE — 97802 MEDICAL NUTRITION INDIV IN: CPT

## 2023-08-24 NOTE — PATIENT INSTRUCTIONS
Consume 3 balanced meals/day at appropriate times. 30 grams carbohydrate/meal and 15 grams carbohydrate/snack. Consume 2 balanced snacks/day at appropriate times.

## 2023-08-24 NOTE — PROGRESS NOTES
Virtual Regular Visit    Verification of patient location:    Patient is located at Home in the following state in which I hold an active license [place]      Assessment/Plan:    Problem List Items Addressed This Visit        Endocrine    Type 2 diabetes mellitus without complication, without long-term current use of insulin ([address])            Reason for visit is   Chief Complaint   Patient presents with   • Diabetes Type 2   • Virtual Regular Visit        Encounter provider [facility]    Provider located at [address] [address] [phone]      Recent Visits  Date Type Provider Dept   08/21/23 Office Visit Lexie Walton MD [facility] Internal Med [address] recent visits within past 7 days and meeting all other requirements  Today's Visits  Date Type Provider Dept   08/24/23 Telemedicine 24 [facility] Pg Diabetic Education Aaron Dillard today's visits and meeting all other requirements  Future Appointments  No visits were found meeting these conditions. Showing future appointments within next 150 days and meeting all other requirements       The patient was identified by name and date of birth. Rajinder Taylor was informed that this is a telemedicine visit and that the visit is being conducted through the [employer]. She agrees to proceed. .  My office door was closed. No one else was in the room. She acknowledged consent and understanding of privacy and security of the video platform. The patient has agreed to participate and understands they can discontinue the visit at any time. Patient is aware this is a billable service. Subjective  Rajinder Taylor is a 70 y.o. female with Type 2 Diabetes Mellitus.        HPI     Past Medical History:   Diagnosis Date   • Arthritis    • Cataract     07/16/2017   • Detached retina     left   • Hyperlipidemia    • Hypertension    • Mass of arm left arm    • Obesity    • Visual impairment        Past Surgical History:   Procedure Laterality Date   • CARPAL TUNNEL RELEASE Left    • CATARACT EXTRACTION Left    • COLONOSCOPY     • EYE SURGERY     • MASS EXCISION Left 1/24/2022    Procedure: EXCISION BIOPSY TISSUE LESION/MASS UPPER EXTREMITY;  Surgeon: Simin Moffett DO;  Location: [facility];  Service: General   • HI COLONOSCOPY FLX DX W/COLLJ SPEC WHEN PFRMD N/A 8/29/2017    Procedure: COLONOSCOPY;  Surgeon: Martha Miguel MD;  Location: [facility]; Service: Gastroenterology   • RETINAL DETACHMENT SURGERY Left        Current Outpatient Medications   Medication Sig Dispense Refill   • metFORMIN (GLUCOPHAGE-XR) 500 mg 24 hr tablet Take 1 tablet (500 mg total) by mouth daily with dinner 90 tablet 1   • Ascorbic Acid (VITAMIN C PO) Take by mouth     • atenolol (TENORMIN) 50 mg tablet Take 2 tablets (100 mg total) by mouth daily at bedtime 180 tablet 1   • cholecalciferol (VITAMIN D3) 1,000 units tablet Take 1,000 Units by mouth daily at bedtime     • losartan-hydrochlorothiazide (HYZAAR) 100-25 MG per tablet Take 1 tablet by mouth daily 90 tablet 1   • methenamine hippurate (HIPREX) 1 g tablet Take 1 g by mouth daily     • multivitamin (THERAGRAN) TABS Take 1 tablet by mouth daily     • prednisoLONE acetate (PRED FORTE) 1 % ophthalmic suspension daily  1   • simvastatin (ZOCOR) 40 mg tablet Take 1 tablet (40 mg total) by mouth daily at bedtime 90 tablet 1   • vitamin B-12 (VITAMIN B-12) 1,000 mcg tablet Take 1,000 mcg by mouth daily       No current facility-administered medications for this visit.         Allergies   Allergen Reactions   • Ciprofloxacin Other (See Comments)     Possible detached retina  Possible detached retina       Review of Systems    Video Exam    Vitals:    08/24/23 0946   Weight: 119 kg (263 lb)       Physical Exam     Visit Time  Total Visit Duration: 60 minutes      Medical Nutrition Therapy        Assessment    Visit Type: Initial visit  Chief complaint/Medical Diagnosis/reason for visit E11.9 Type 2 Diabetes Mellitus without complication without current use of insulin    HPI Sahara attended her Virtual Medical Nutrition Therapy appointment today. Tee Rolon stated she has had Pre-Diabetes for about 2 years, and was just diagnosed with Type 2 Diabetes Mellitus this past week. Tee Rolon stated she has been eating healthy for the past 6 months. Sahara's goal is to lower her blood sugars, her A1C, and to lose weight. Sahara's most current A1C = 7.1 on 8/14/23. Obtained a 24 hour food recall. Problems identified in food recall include inconsistent carbohydrate intake, and some unbalanced meals. Educated patient on a 5826-8241 calorie balanced individualized meal plan to assist with consistency, balance and portion control. Encouraged the consumption of balanced meals and snacks at appropriate times. Advised patient to keep carbohydrate intake to 30 grams per meal and 15 grams per snack to assist with glycemic control. Portion booklet and food labels were used to teach basic carbohydrate counting. Patient stated she will call at a later date if she desires additional MNT education. RD will remain available for further dietary questions/concerns.      Ht Readings from Last 1 Encounters:   08/21/23 5' 9" (1.753 m)     Wt Readings from Last 3 Encounters:   08/24/23 119 kg (263 lb)   08/21/23 119 kg (263 lb 3.2 oz)   06/19/23 121 kg (266 lb)     Weight Change: No    Barriers to Learning: no barriers    Do you follow any special diet presently?: Yes - follows Lose-it and Weight Watchers, and does not eat white foods, and has decreased carbohydrates  Who shops: patient  Who cooks: patient    Food Log: Completed via the method of food recall    Breakfast:8AM - 1 sl whole grain bread, 1 egg, 1 sl cheese, 32 oz water  Morning Snack:None  Lunch:12:30PM - 2 [place] crackers, tuna and cottage cheese, water  Afternoon Snack: 4PM - 1/4 c almonds  Dinner:6:30PM - large salad with adam, kale, cucumbers, radishes, with grilled salmon or chicken, water  Evening Snack:8:30PM - 1/2c pistachios  Beverages: water  Eating out/Take out:occasionally  Exercise 30 minutes walking 6 days/week    Calorie needs 1500-1600kcals/day  Carbs: 30g/meal, 15g/snack         Nutrition Diagnosis:  Food and nutrition related knowledge deficit  related to Lack of prior exposure to accurate nutrition related information as evidenced by [employer] inaccurate or incomplete information    Intervention: label reading, carbohydrate counting, increased protein intake, meal timing, weight/ BMI goals, meal planning, individualized meal plan, monitoring portion control and exercise guidelines     Treatment Goals: Patient will consume 3 meals a day, Patient will consume snacks and Patient will count carbohydrates    Monitoring and evaluation:    Term code indicator  FH 1.3.2 Food Intake Criteria: Consume 3 balanced meals/day at appropriate times. Term code indicator  FH 1.6.3 Carbohydrate Intake Criteria: 30 grams carbohydrate/meal and 15 grams carbohydrate/snack. Term code indicator  FH 1.3.2 Food Intake Criteria: Consume 2 balanced snacks/day at appropriate times. Materials Discussed and sent: Portion Booklet, individualized meal plan, CHO counting    Patient’s Response to Instruction:  Comprehensiongood  Motivationgood  Expected Compliancegood    Begin Time: 8:35AM  End Time: 9:35AM  Referring Provider: Brendan Vicente MD    Thank you for coming to the [address] [place] [address] for education today. Please feel free to call with any questions or concerns.     [facility]  [address] [place] [address] [phone]

## 2023-09-08 ENCOUNTER — OFFICE VISIT (OUTPATIENT)
Dept: INTERNAL MEDICINE CLINIC | Facility: CLINIC | Age: 68
End: 2023-09-08
Payer: MEDICARE

## 2023-09-08 VITALS
BODY MASS INDEX: 38.95 KG/M2 | OXYGEN SATURATION: 92 % | WEIGHT: 263 LBS | HEART RATE: 102 BPM | HEIGHT: 69 IN | TEMPERATURE: 103.2 F

## 2023-09-08 DIAGNOSIS — U07.1 COVID-19 VIRUS INFECTION: Primary | ICD-10-CM

## 2023-09-08 PROCEDURE — 99214 OFFICE O/P EST MOD 30 MIN: CPT | Performed by: INTERNAL MEDICINE

## 2023-09-08 RX ORDER — NIRMATRELVIR AND RITONAVIR 300-100 MG
3 KIT ORAL 2 TIMES DAILY
Qty: 30 TABLET | Refills: 0 | Status: SHIPPED | OUTPATIENT
Start: 2023-09-08 | End: 2023-09-13

## 2023-09-08 NOTE — PROGRESS NOTES
COVID-19 Outpatient Progress Note    Assessment/Plan:    Problem List Items Addressed This Visit    None  Visit Diagnoses     COVID-19 virus infection    -  Primary    Relevant Medications    nirmatrelvir & ritonavir (Paxlovid, 300/100,) tablet therapy pack         Disposition:     Patient has asymptomatic or mild COVID-19 infection. Based off CDC guidelines, they were recommended to isolate for 5 days. If they are asymptomatic or symptoms are improving with no fevers in the past 24 hours, isolation may be ended followed by 5 days of wearing a mask when around othes to minimize risk of infecting others. If still have a fever or other symptoms have not improved, continue to isolate until they improve. Regardless of when they end isolation, avoid being around people who are more likely to get very sick from COVID-19 until at least day 11. I have spent a total time of 30 minutes on the day of the encounter for this patient including risks and benefits of treatment options, instructions for management, patient and family education, importance of treatment compliance, counseling/coordination of care and documenting in the medical record. Encounter provider: Alis Terry MD     Provider located at: [address] [facility] [id number] [place] [address] 9  330 [facility]  [address] [address] [phone]     Recent Visits  No visits were found meeting these conditions. Showing recent visits within past 7 days and meeting all other requirements  Today's Visits  Date Type Provider Dept   09/08/23 Office Visit Alis Terry MD [facility] [address] today's visits and meeting all other requirements  Future Appointments  No visits were found meeting these conditions. Showing future appointments within next 150 days and meeting all other requirements     Subjective:   Connie Mathis is a 70 y.o. female who has been screened for COVID-19.  Symptom change since last report: worsening. Patient's symptoms include fever, cough, shortness of breath, chest tightness and headache.     - Date of symptom onset: 9/7/2023  - Date of positive COVID-19 test: 9/8/2023. Type of test: Home antigen. COVID-19 vaccination status: Fully vaccinated with booster    Jwmicourtney Adame has been staying home and has isolated themselves in her home. She is taking care to not share personal items and is cleaning all surfaces that are touched often, like counters, tabletops, and doorknobs using household cleaning sprays or wipes. She is wearing a mask when she leaves her room. Lab Results   Component Value Date    SARSCOV2 Negative 01/08/2022    [address] Not Detected 06/06/2022       Review of Systems   Constitutional: Positive for fever. Respiratory: Positive for cough, chest tightness and shortness of breath. Neurological: Positive for headaches.      Current Outpatient Medications on File Prior to Visit   Medication Sig   • Ascorbic Acid (VITAMIN C PO) Take by mouth   • atenolol (TENORMIN) 50 mg tablet Take 2 tablets (100 mg total) by mouth daily at bedtime   • cholecalciferol (VITAMIN D3) 1,000 units tablet Take 1,000 Units by mouth daily at bedtime   • losartan-hydrochlorothiazide (HYZAAR) 100-25 MG per tablet Take 1 tablet by mouth daily   • metFORMIN (GLUCOPHAGE-XR) 500 mg 24 hr tablet Take 1 tablet (500 mg total) by mouth daily with dinner   • methenamine hippurate (HIPREX) 1 g tablet Take 1 g by mouth daily   • multivitamin (THERAGRAN) TABS Take 1 tablet by mouth daily   • prednisoLONE acetate (PRED FORTE) 1 % ophthalmic suspension daily   • simvastatin (ZOCOR) 40 mg tablet Take 1 tablet (40 mg total) by mouth daily at bedtime   • vitamin B-12 (VITAMIN B-12) 1,000 mcg tablet Take 1,000 mcg by mouth daily       Objective:    Pulse 102   Temp (!) 103.2 °F (39.6 °C) (Tympanic)   Ht 5' 9" (1.753 m)   Wt 119 kg (263 lb)   SpO2 92%   BMI 38.84 kg/m²        Physical Exam  Vitals and nursing note reviewed. Constitutional:       Appearance: Normal appearance. She is ill-appearing. Cardiovascular:      Rate and Rhythm: Normal rate and regular rhythm. Pulmonary:      Effort: Pulmonary effort is normal. No respiratory distress. Breath sounds: No wheezing, rhonchi or rales. Neurological:      Mental Status: She is alert.        Obdulio Kendall MD

## 2023-09-14 ENCOUNTER — TELEPHONE (OUTPATIENT)
Dept: INTERNAL MEDICINE CLINIC | Facility: CLINIC | Age: 68
End: 2023-09-14

## 2023-09-14 NOTE — TELEPHONE ENCOUNTER
Patient just getting over Covid, she was taking the Paxlovid since last Friday. Patient tested herself this morning and there was still a very faint line. Patient's question is that she has an appointment coming up for next week and needs to get blood work done. Patient is not sure if this will interfere with the blood work or not? Please advise and notify patient.

## 2023-09-18 NOTE — TELEPHONE ENCOUNTER
As per Dr. Rey Shah, "She can get her labs and keep her follow-up.  Patients can still test positive for COVID even after their infection is cleared."    Called patient and lmom advising of this.

## 2023-09-19 ENCOUNTER — APPOINTMENT (OUTPATIENT)
Dept: LAB | Facility: CLINIC | Age: 68
End: 2023-09-19
Payer: MEDICARE

## 2023-09-19 DIAGNOSIS — E11.9 TYPE 2 DIABETES MELLITUS WITHOUT COMPLICATION, WITHOUT LONG-TERM CURRENT USE OF INSULIN (HCC): ICD-10-CM

## 2023-09-19 LAB
ALBUMIN SERPL BCP-MCNC: 4.6 G/DL (ref 3.5–5)
ALP SERPL-CCNC: 57 U/L (ref 34–104)
ALT SERPL W P-5'-P-CCNC: 24 U/L (ref 7–52)
ANION GAP SERPL CALCULATED.3IONS-SCNC: 11 MMOL/L
AST SERPL W P-5'-P-CCNC: 21 U/L (ref 13–39)
BILIRUB SERPL-MCNC: 1.35 MG/DL (ref 0.2–1)
BUN SERPL-MCNC: 18 MG/DL (ref 5–25)
CALCIUM SERPL-MCNC: 9.3 MG/DL (ref 8.4–10.2)
CHLORIDE SERPL-SCNC: 101 MMOL/L (ref 96–108)
CO2 SERPL-SCNC: 28 MMOL/L (ref 21–32)
CREAT SERPL-MCNC: 0.6 MG/DL (ref 0.6–1.3)
EST. AVERAGE GLUCOSE BLD GHB EST-MCNC: 151 MG/DL
GFR SERPL CREATININE-BSD FRML MDRD: 93 ML/MIN/1.73SQ M
GLUCOSE P FAST SERPL-MCNC: 152 MG/DL (ref 65–99)
HBA1C MFR BLD: 6.9 %
POTASSIUM SERPL-SCNC: 4 MMOL/L (ref 3.5–5.3)
PROT SERPL-MCNC: 7.1 G/DL (ref 6.4–8.4)
SODIUM SERPL-SCNC: 140 MMOL/L (ref 135–147)

## 2023-09-19 PROCEDURE — 80053 COMPREHEN METABOLIC PANEL: CPT

## 2023-09-19 PROCEDURE — 36415 COLL VENOUS BLD VENIPUNCTURE: CPT

## 2023-09-19 PROCEDURE — 83036 HEMOGLOBIN GLYCOSYLATED A1C: CPT

## 2023-09-20 ENCOUNTER — VBI (OUTPATIENT)
Dept: INTERNAL MEDICINE CLINIC | Facility: CLINIC | Age: 68
End: 2023-09-20

## 2023-09-22 ENCOUNTER — OFFICE VISIT (OUTPATIENT)
Dept: INTERNAL MEDICINE CLINIC | Facility: CLINIC | Age: 68
End: 2023-09-22
Payer: MEDICARE

## 2023-09-22 VITALS
WEIGHT: 262.2 LBS | BODY MASS INDEX: 38.83 KG/M2 | DIASTOLIC BLOOD PRESSURE: 82 MMHG | HEART RATE: 67 BPM | OXYGEN SATURATION: 93 % | SYSTOLIC BLOOD PRESSURE: 130 MMHG | HEIGHT: 69 IN

## 2023-09-22 DIAGNOSIS — Z86.16 HISTORY OF COVID-19: ICD-10-CM

## 2023-09-22 DIAGNOSIS — E11.9 TYPE 2 DIABETES MELLITUS WITHOUT COMPLICATION, WITHOUT LONG-TERM CURRENT USE OF INSULIN (HCC): Primary | ICD-10-CM

## 2023-09-22 PROCEDURE — 99214 OFFICE O/P EST MOD 30 MIN: CPT | Performed by: INTERNAL MEDICINE

## 2023-09-22 NOTE — PROGRESS NOTES
Assessment/Plan:     Her diabetes is improving. Continue with the metformin 500 mg. Keep working on diet. Ordered full complement of labs for next visit. She is back to normal after her COVID infection. No residual issues. Quality Measures:       Return in about 3 months (around 12/22/2023) for Recheck. No problem-specific Assessment & Plan notes found for this encounter. Diagnoses and all orders for this visit:    Type 2 diabetes mellitus without complication, without long-term current use of insulin (HCC)  -     Comprehensive metabolic panel; Future  -     CBC and differential; Future  -     Hemoglobin A1C; Future  -     Lipid panel; Future    History of COVID-19          Subjective:      Patient ID: Catrachito Hernandez is a 70 y.o. female. Patient comes in today for follow-up. She started improving the next day after starting the Paxlovid from her COVID infection. She is back to normal.  Feels fine in that regards. For her sugars, she is taking the metformin without any side effects. She did have a meeting with nutritionist and has made adjustments. She has already lost some weight, although, some of that may be from the COVID illness. She is doing better with diet. Sugars are coming down. A1c is already back down to 6.9. No new complaints today. No further additions to her history.       ALLERGIES:  Allergies   Allergen Reactions   • Ciprofloxacin Other (See Comments)     Possible detached retina  Possible detached retina       CURRENT MEDICATIONS:    Current Outpatient Medications:   •  Ascorbic Acid (VITAMIN C PO), Take by mouth, Disp: , Rfl:   •  atenolol (TENORMIN) 50 mg tablet, Take 2 tablets (100 mg total) by mouth daily at bedtime, Disp: 180 tablet, Rfl: 1  •  cholecalciferol (VITAMIN D3) 1,000 units tablet, Take 1,000 Units by mouth daily at bedtime, Disp: , Rfl:   •  losartan-hydrochlorothiazide (HYZAAR) 100-25 MG per tablet, Take 1 tablet by mouth daily, Disp: 90 tablet, Rfl: 1  •  metFORMIN (GLUCOPHAGE-XR) 500 mg 24 hr tablet, Take 1 tablet (500 mg total) by mouth daily with dinner, Disp: 90 tablet, Rfl: 1  •  methenamine hippurate (HIPREX) 1 g tablet, Take 1 g by mouth daily, Disp: , Rfl:   •  multivitamin (THERAGRAN) TABS, Take 1 tablet by mouth daily, Disp: , Rfl:   •  prednisoLONE acetate (PRED FORTE) 1 % ophthalmic suspension, daily, Disp: , Rfl: 1  •  simvastatin (ZOCOR) 40 mg tablet, Take 1 tablet (40 mg total) by mouth daily at bedtime, Disp: 90 tablet, Rfl: 1  •  vitamin B-12 (VITAMIN B-12) 1,000 mcg tablet, Take 1,000 mcg by mouth daily, Disp: , Rfl:     ACTIVE PROBLEM LIST:  Patient Active Problem List   Diagnosis   • Arthritis   • Benign essential hypertension   • Detached retina, left   • Mixed hyperlipidemia   • Type 2 diabetes mellitus without complication, without long-term current use of insulin (HCC)   • Varicose veins of lower extremity   • Thyroid nodule   • Obesity, morbid (HCC)   • Fall   • Closed nondisplaced fracture of head of left radius       PAST MEDICAL HISTORY:  Past Medical History:   Diagnosis Date   • Arthritis    • Cataract     07/16/2017   • Detached retina     left   • Hyperlipidemia    • Hypertension    • Mass of arm     left arm    • Obesity    • Visual impairment        PAST SURGICAL HISTORY:  Past Surgical History:   Procedure Laterality Date   • CARPAL TUNNEL RELEASE Left    • CATARACT EXTRACTION Left    • COLONOSCOPY     • EYE SURGERY     • MASS EXCISION Left 1/24/2022    Procedure: EXCISION BIOPSY TISSUE LESION/MASS UPPER EXTREMITY;  Surgeon: Elena Yadav DO;  Location: [facility];  Service: General   • HI COLONOSCOPY FLX DX W/COLLJ SPEC WHEN PFRMD N/A 8/29/2017    Procedure: COLONOSCOPY;  Surgeon: Jadon Saxena MD;  Location: [facility];   Service: Gastroenterology   • RETINAL DETACHMENT SURGERY Left        FAMILY HISTORY:  Family History   Problem Relation Age of Onset   • Coronary artery disease Mother    • Diabetes Mother    • COPD Father    • Coronary artery disease Brother    • Valvular heart disease Brother    • Cancer Daughter        SOCIAL HISTORY:  Social History     Socioeconomic History   • Marital status:      Spouse name: Not on file   • Number of children: 2   • Years of education: Not on file   • Highest education level: Not on file   Occupational History   • Occupation:       Comment: retired    Tobacco Use   • Smoking status: Never   • Smokeless tobacco: Never   Vaping Use   • Vaping Use: Never used   Substance and Sexual Activity   • Alcohol use: Not Currently     Comment: RARELY,   • Drug use: No   • Sexual activity: Not Currently     Partners: Male     Birth control/protection: None   Other Topics Concern   • Not on file   Social History Narrative    Brushes teeth twice a day    Dental care    Engages in instrumental music     Exercise: Yard work     Exercises reg,      Social Determinants of Health     Financial Resource Strain: Low Risk  (8/20/2023)    Overall Financial Resource Strain (CARDIA)    • Difficulty of Paying Living Expenses: Not hard at all   Food Insecurity: Not on file   Transportation Needs: No Transportation Needs (8/20/2023)    PRAPARE - Transportation    • Lack of Transportation (Medical): No    • Lack of Transportation (Non-Medical): No   Physical Activity: Not on file   Stress: Not on file   Social Connections: Not on file   Intimate Partner Violence: Not on file   Housing Stability: Not on file       Review of Systems   Respiratory: Negative for shortness of breath. Cardiovascular: Negative for chest pain. Objective:  Vitals:    09/22/23 1029   BP: 130/82   BP Location: Left arm   Patient Position: Sitting   Cuff Size: Large   Pulse: 67   SpO2: 93%   Weight: 119 kg (262 lb 3.2 oz)   Height: 5' 9" (1.753 m)     Body mass index is 38.72 kg/m². Physical Exam  Vitals and nursing note reviewed. Constitutional:       Appearance: Normal appearance.    Pulmonary: Effort: Pulmonary effort is normal.   Neurological:      Mental Status: She is alert. RESULTS:  Hemoglobin A1C   Date/Time Value Ref Range Status   09/19/2023 09:07 AM 6.9 (H) Normal 4.0-5.6%; PreDiabetic 5.7-6.4%; Diabetic >=6.5%; Glycemic control for adults with diabetes <7.0% % Final     Cholesterol   Date/Time Value Ref Range Status   08/14/2023 09:45  See Comment mg/dL Final     Comment:     Cholesterol:         Pediatric <18 Years        Desirable          <170 mg/dL      Borderline High    170-199 mg/dL      High               >=200 mg/dL        Adult >=18 Years            Desirable        <200 mg/dL      Borderline High  200-239 mg/dL      High             >239 mg/dL       Triglycerides   Date/Time Value Ref Range Status   08/14/2023 09:45  (H) See Comment mg/dL Final     Comment:     Triglyceride:     0-9Y            <75mg/dL     10Y-17Y         <90 mg/dL       >=18Y     Normal          <150 mg/dL     Borderline High 150-199 mg/dL     High            200-499 mg/dL        Very High       >499 mg/dL    Specimen collection should occur prior to N-Acetylcysteine or Metamizole administration due to the potential for falsely depressed results. HDL, Direct   Date/Time Value Ref Range Status   08/14/2023 09:45 AM 35 (L) >=50 mg/dL Final     Comment:     Specimen collection should occur prior to Metamizole administration due to the potential for falsley depressed results. LDL Calculated   Date/Time Value Ref Range Status   08/14/2023 09:45 AM 41 0 - 100 mg/dL Final     Comment:     LDL Cholesterol:     Optimal           <100 mg/dl     Near Optimal      100-129 mg/dl     Above Optimal       Borderline High 130-159 mg/dl       High            160-189 mg/dl       Very High       >189 mg/dl         This screening LDL is a calculated result. It does not have the accuracy of the Direct Measured LDL in the monitoring of patients with hyperlipidemia and/or statin therapy.    Direct Measure LDL ([id number]) must be ordered separately in these patients. Hemoglobin   Date/Time Value Ref Range Status   08/14/2023 09:45 AM 14.6 11.5 - 15.4 g/dL Final     Hematocrit   Date/Time Value Ref Range Status   08/14/2023 09:45 AM 43.1 34.8 - 46.1 % Final     Platelets   Date/Time Value Ref Range Status   08/14/2023 09:45  149 - 390 Thousands/uL Final     Sodium   Date/Time Value Ref Range Status   09/19/2023 09:07  135 - 147 mmol/L Final     BUN   Date/Time Value Ref Range Status   09/19/2023 09:07 AM 18 5 - 25 mg/dL Final     Creatinine   Date/Time Value Ref Range Status   09/19/2023 09:07 AM 0.60 0.60 - 1.30 mg/dL Final     Comment:     Standardized to IDMS reference method      In chart    This note was created with voice recognition software. Phonic, grammatical and spelling errors may be present within the note as a result.

## 2023-10-19 ENCOUNTER — EVALUATION (OUTPATIENT)
Dept: PHYSICAL THERAPY | Facility: CLINIC | Age: 68
End: 2023-10-19
Payer: MEDICARE

## 2023-10-19 DIAGNOSIS — M17.0 PRIMARY OSTEOARTHRITIS OF BOTH KNEES: Primary | ICD-10-CM

## 2023-10-19 PROCEDURE — 97110 THERAPEUTIC EXERCISES: CPT | Performed by: PHYSICAL THERAPIST

## 2023-10-19 PROCEDURE — 97161 PT EVAL LOW COMPLEX 20 MIN: CPT | Performed by: PHYSICAL THERAPIST

## 2023-10-19 NOTE — LETTER
2023    Katherine Vazquez PA-C  [facility] [address] [address]    Patient: Frank Chirinos   YOB: 1955   Date of Visit: 10/19/2023     Encounter Diagnosis     ICD-10-CM    1. Primary osteoarthritis of both knees  M17.0 CANCELED: PT plan of care cert/re-cert          Dear Dr. Osmel Boyd: Thank you for your recent referral of Frank Chirinos. Please review the attached evaluation summary from [address] [place] [address] recent visit. Please verify that you agree with the plan of care by signing the attached order. If you have any questions or concerns, please do not hesitate to call. I sincerely appreciate the opportunity to share in the care of one of your patients and hope to have another opportunity to work with you in the near future. Sincerely,    Juancho Verdugo, PT      Referring Provider:      I certify that I have read the below Plan of Care and certify the need for these services furnished under this plan of treatment while under my care. Katherine Vazquez PA-C  [facility] [address] [address]  Via Fax: [phone]          PT Evaluation     Today's date: 10/19/2023  Patient name: Frank Chirinos  : 1955  MRN: [id number]  Referring provider: Katherine Vazquez PA-C  Dx:   Encounter Diagnosis     ICD-10-CM    1. Primary osteoarthritis of both knees  M17.0                      Assessment  Assessment details: Pt is a 71 y/o female who presents to physical therapy with primary nociceptive pain associated with L knee OA in the environment of reduced hip strength complicated by chronicity of symptoms. Pt does not present with any red flag symptoms at this time. Further investigation of stair negotiation revealed pain with L LE leading up the stairs and behind when coming down the stairs. Pain was also recreated with this. SL balance revealed a trendelenburg on the L and increased sway.  Normal joint ROM of the knee was revealed not limiting her ability to climb stairs. Reduced hip abd strength was also noted on the L compared to the R but not with hip extension or quad/hamstring strength. Following trial, pt reported almost no pain with stairs. Education provided regarding POC, prognosis and HEP, pt verablized understanding. Pt would benefit from skilled physical therapy in order to decrease deficits and return to prior level of function. Impairments: abnormal gait, abnormal or restricted ROM, activity intolerance, impaired physical strength and pain with function    Symptom irritability: lowUnderstanding of Dx/Px/POC: good  Goals  STG (2-3 weeks):  Pt will be independent with HEP. Pt will demonstrate increase in MMT grade by 1/3 for hip abd. LTG (6 weeks): FOTO will be expected outcome. Pt will report no pain with stair negotiation with reciprocal pattern. Plan  Patient would benefit from: skilled physical therapy  Planned modality interventions: cryotherapy  Planned therapy interventions: manual therapy, neuromuscular re-education, patient education, self care, strengthening, stretching, therapeutic activities, therapeutic exercise and home exercise program  Frequency: 1-2x/week. Duration in weeks: 6  Treatment plan discussed with: patient    Subjective Evaluation    History of Present Illness  Mechanism of injury: Chief Complaint: Pt reports that she began having b/l knee pain 5 years ago. She states that it continued to increase until 2 years ago until she went to get it looked at. Sports med MD administered CSI 1x into each knee and within 20 mins she had full relief of symptoms. She states that 7 weeks later she got gel injections, lasted 3-6 months and then CSI and gel, lasted about 6 months. Most recent gel injection 3.5 weeks ago. Each time the pain comes back, pt reports her symptoms are of gradual onset.      Severity: low  Irritability: low  Nature:nociceptive  Stage: chronic  Stability: evolving    P1: see body chart    Occupation: Retired  Patient Goals  Patient goal: be able to go up and down stairs with reciprocal pattern    Objective     Observations     Additional Observation Details  Reduced L knee ext in standing  Slight offloading of the L LE in quiet standing    Stairs up:  R LE some difficulty no pain  L LE pain at a 3/10    Stairs down:  R LE turned but no pain  L LE pain at a 4/10 and lack of eccentric control    Gait: pt reported 1/10 pain in the L medial knee    SLS: L LE no pain but slight trendelenburg on the L, increased sway on the L    Neurological Testing     Additional Neurological Details  Light touch intact throughout the foot, except slight reduction in the plantar aspect of the L heel    Lumbar ROM screen- WNL and no recreation of symptoms with OP in straight plane motions    Vision: good ability to track with both eyes, peripheral vision WNL- reports of wavy vision but no areas of the vision that are blacked out    Active Range of Motion   Left Knee   Normal active range of motion    Right Knee   Normal active range of motion    Passive Range of Motion   Left Knee   Normal passive range of motion    Right Knee   Normal passive range of motion    Additional Passive Range of Motion Details  No recreation of symptoms with OP into flexion    Strength/Myotome Testing     Left Knee   Flexion: 5  Extension: 4+    Right Knee   Flexion: 5  Extension: 4+    Additional Strength Details  Hip:  Bridge- SL better on the L compared to the R  Abd: 4/5 R, 3+/5 L           Precautions: HTN    POC expires Unit limit Auth Expiration date PT/OT + Visit Limit?   12/14/23 6 combo No BOMN                               Therapeutic exercise = (TE)  Therapeutic activity= (TA)  Manual Therapy= (MT)  Neuromuscular re-education- (NRE)  Gait training= (GT)    TE 1. Sidelying hip abd- 1x2 RIR ~20 reps  *step up on the L- 97% improved reported by patient  *gait- no pain  TE 2.  Pt education- POC, pathoanatomy, HEP    10/19: Step up on L, SLS  Sidelying hip abd

## 2023-10-19 NOTE — PROGRESS NOTES
PT Evaluation     Today's date: 10/19/2023  Patient name: Abebe Clement  : 1955  MRN: [id number]  Referring provider: Guera Le PA-C  Dx:   Encounter Diagnosis     ICD-10-CM    1. Primary osteoarthritis of both knees  M17.0                      Assessment  Assessment details: Pt is a 69 y/o female who presents to physical therapy with primary nociceptive pain associated with L knee OA in the environment of reduced hip strength complicated by chronicity of symptoms. Pt does not present with any red flag symptoms at this time. Further investigation of stair negotiation revealed pain with L LE leading up the stairs and behind when coming down the stairs. Pain was also recreated with this. SL balance revealed a trendelenburg on the L and increased sway. Normal joint ROM of the knee was revealed not limiting her ability to climb stairs. Reduced hip abd strength was also noted on the L compared to the R but not with hip extension or quad/hamstring strength. Following trial, pt reported almost no pain with stairs. Education provided regarding POC, prognosis and HEP, pt verablized understanding. Pt would benefit from skilled physical therapy in order to decrease deficits and return to prior level of function. Impairments: abnormal gait, abnormal or restricted ROM, activity intolerance, impaired physical strength and pain with function    Symptom irritability: lowUnderstanding of Dx/Px/POC: good  Goals  STG (2-3 weeks):  Pt will be independent with HEP. Pt will demonstrate increase in MMT grade by 1/3 for hip abd. LTG (6 weeks): FOTO will be expected outcome. Pt will report no pain with stair negotiation with reciprocal pattern.       Plan  Patient would benefit from: skilled physical therapy  Planned modality interventions: cryotherapy  Planned therapy interventions: manual therapy, neuromuscular re-education, patient education, self care, strengthening, stretching, therapeutic activities, therapeutic exercise and home exercise program  Frequency: 1-2x/week. Duration in weeks: 6  Treatment plan discussed with: patient    Subjective Evaluation    History of Present Illness  Mechanism of injury: Chief Complaint: Pt reports that she began having b/l knee pain 5 years ago. She states that it continued to increase until 2 years ago until she went to get it looked at. Sports med MD administered CSI 1x into each knee and within 20 mins she had full relief of symptoms. She states that 7 weeks later she got gel injections, lasted 3-6 months and then CSI and gel, lasted about 6 months. Most recent gel injection 3.5 weeks ago. Each time the pain comes back, pt reports her symptoms are of gradual onset.      Severity: low  Irritability: low  Nature:nociceptive  Stage: chronic  Stability: evolving    P1: see body chart    Occupation: Retired  Patient Goals  Patient goal: be able to go up and down stairs with reciprocal pattern    Objective     Observations     Additional Observation Details  Reduced L knee ext in standing  Slight offloading of the L LE in quiet standing    Stairs up:  R LE some difficulty no pain  L LE pain at a 3/10    Stairs down:  R LE turned but no pain  L LE pain at a 4/10 and lack of eccentric control    Gait: pt reported 1/10 pain in the L medial knee    SLS: L LE no pain but slight trendelenburg on the L, increased sway on the L    Neurological Testing     Additional Neurological Details  Light touch intact throughout the foot, except slight reduction in the plantar aspect of the L heel    Lumbar ROM screen- WNL and no recreation of symptoms with OP in straight plane motions    Vision: good ability to track with both eyes, peripheral vision WNL- reports of wavy vision but no areas of the vision that are blacked out    Active Range of Motion   Left Knee   Normal active range of motion    Right Knee   Normal active range of motion    Passive Range of Motion   Left Knee   Normal passive range of motion    Right Knee   Normal passive range of motion    Additional Passive Range of Motion Details  No recreation of symptoms with OP into flexion    Strength/Myotome Testing     Left Knee   Flexion: 5  Extension: 4+    Right Knee   Flexion: 5  Extension: 4+    Additional Strength Details  Hip:  Bridge- SL better on the L compared to the R  Abd: 4/5 R, 3+/5 L           Precautions: HTN    POC expires Unit limit Auth Expiration date PT/OT + Visit Limit?   12/14/23 6 combo No BOMN                               Therapeutic exercise = (TE)  Therapeutic activity= (TA)  Manual Therapy= (MT)  Neuromuscular re-education- (NRE)  Gait training= (GT)    TE 1. Sidelying hip abd- 1x2 RIR ~20 reps  *step up on the L- 97% improved reported by patient  *gait- no pain  TE 2.  Pt education- POC, pathoanatomy, HEP    10/19: Step up on L, SLS  Sidelying hip abd [Negative] : Respiratory

## 2023-10-27 ENCOUNTER — OFFICE VISIT (OUTPATIENT)
Dept: PHYSICAL THERAPY | Facility: CLINIC | Age: 68
End: 2023-10-27
Payer: MEDICARE

## 2023-10-27 DIAGNOSIS — M17.0 PRIMARY OSTEOARTHRITIS OF BOTH KNEES: Primary | ICD-10-CM

## 2023-10-27 PROCEDURE — 97110 THERAPEUTIC EXERCISES: CPT | Performed by: PHYSICAL THERAPIST

## 2023-10-27 NOTE — PROGRESS NOTES
Daily Note     Today's date: 10/27/2023  Patient name: Catrachito Hernandez  : 1955  MRN: [id number]  Referring provider: Apolinar Hernandez PA-C  Dx:   Encounter Diagnosis     ICD-10-CM    1. Primary osteoarthritis of both knees  M17.0                      Subjective: Pt reports not much change since IE. She states that she was only able to do the exercises 4x since last visit. She states also that she only did 1 set of 20 or 15 at home. Objective: See treatment diary below      Assessment: Tolerated treatment well. Demonstrated to patient the importance of proper dosage for HEP as asterisks only improved with challenging exercise. Noted improvement with hip abd exercise, as well as quad strengthening exercise. Updated HEP. Patient would benefit from continued PT      Plan: Continue per plan of care. Progress treatment as tolerated. Precautions: HTN    POC expires Unit limit Auth Expiration date PT/OT + Visit Limit?   23 6 combo No BOMN                               Therapeutic exercise = (TE)  Therapeutic activity= (TA)  Manual Therapy= (MT)  Neuromuscular re-education- (NRE)  Gait training= (GT)    TE 1. Sidelying hip abd- 1x2 RIR ~20 reps  *step up on the L- 97% improved reported by patient  *gait- no pain  TE 2. Pt education- POC, pathoanatomy, HEP    10/19: Step up on L, SLS  TE 1. Sidelying hip abd- HEP version 1x20, 1x15  *step up- no change  TE 2. Sidelying hip abd- 1x28  *step up- 2/10 pain improved stability  TE 3. Sidelying hip abd- YTB 1x15  *step up - no change  TE 4. SLR- RTB 1x15  *step up- no change  TE 5. LAQ- 7.5# 25x  *step up- 1/10 pain and diffuse knee pain became more localized to medial patella   TE 6.  LAQ- 7.5# 25x  *step up- no further improvement

## 2023-11-03 ENCOUNTER — OFFICE VISIT (OUTPATIENT)
Dept: PHYSICAL THERAPY | Facility: CLINIC | Age: 68
End: 2023-11-03
Payer: MEDICARE

## 2023-11-03 DIAGNOSIS — M17.0 PRIMARY OSTEOARTHRITIS OF BOTH KNEES: Primary | ICD-10-CM

## 2023-11-03 PROCEDURE — 97140 MANUAL THERAPY 1/> REGIONS: CPT | Performed by: PHYSICAL THERAPIST

## 2023-11-03 PROCEDURE — 97110 THERAPEUTIC EXERCISES: CPT | Performed by: PHYSICAL THERAPIST

## 2023-11-03 PROCEDURE — 97112 NEUROMUSCULAR REEDUCATION: CPT | Performed by: PHYSICAL THERAPIST

## 2023-11-03 NOTE — PROGRESS NOTES
Daily Note     Today's date: 11/3/2023  Patient name: Albin Mejias  : 1955  MRN: [id number]  Referring provider: Kyler Hernandez PA-C  Dx:   Encounter Diagnosis     ICD-10-CM    1. Primary osteoarthritis of both knees  M17.0                      Subjective: Pt reports slight improvement. Completing HEP as prescribed. Objective: See treatment diary below      Assessment: Tolerated treatment well. Discussed with patient the typical healing timeframe following TKA as pt inquired. Discussed purpose of therapy now to help reduce fall risk, improve strength and physical condition to the best of our ability. Pt agreeable. Branched treatment arms into balance domain. Updated HEP. Began glute strengthening as well as lumbar motor control. Patient would benefit from continued PT      Plan: Continue per plan of care. Progress treatment as tolerated. Precautions: HTN    POC expires Unit limit Auth Expiration date PT/OT + Visit Limit?   23 6 combo No BOMN                               Therapeutic exercise = (TE)  Therapeutic activity= (TA)  Manual Therapy= (MT)  Neuromuscular re-education- (NRE)  Gait training= (GT)    TE 1. Sidelying hip abd- 1x2 RIR ~20 reps  *step up on the L- 97% improved reported by patient  *gait- no pain  TE 2. Pt education- POC, pathoanatomy, HEP    10/19: Step up on L, SLS  TE 1. Sidelying hip abd- HEP version 1x20, 1x15  *step up- no change  TE 2. Sidelying hip abd- 1x28  *step up- 2/10 pain improved stability  TE 3. Sidelying hip abd- YTB 1x15  *step up - no change  TE 4. SLR- RTB 1x15  *step up- no change  TE 5. LAQ- 7.5# 25x  *step up- 1/10 pain and diffuse knee pain became more localized to medial patella   TE 6. LAQ- 7.5# 25x  *step up- no further improvement    11/3: step up on L  *step up- no pain but unsteady on L  *step down- pain and lack of ecc control on the L   TE 1. Pt education as above  MT 2. Reassessment of knee ROM- WNL without pain with OP  NRE 4.  Romberg assessment- 30s+ in all facets, increased sway in condition 4  NRE 5. Foam feet together vestibular challenge- able to complete but increased sway  NRE 6. Assessment of gait with head turns- more so related to fast walking and trendelenburg rather than head turning  TE 7.  Sidelying hip abd- RTB above the knee 1x20  NRE 8. Glute bridge with post tilt and BTB around the knees- cueing for glute activation only 1x10x5"  *step down- 50% improved

## 2023-11-04 DIAGNOSIS — I10 BENIGN ESSENTIAL HYPERTENSION: ICD-10-CM

## 2023-11-04 DIAGNOSIS — E78.2 MIXED HYPERLIPIDEMIA: ICD-10-CM

## 2023-11-06 ENCOUNTER — HOSPITAL ENCOUNTER (OUTPATIENT)
Dept: RADIOLOGY | Facility: HOSPITAL | Age: 68
Discharge: HOME | End: 2023-11-06
Payer: MEDICARE

## 2023-11-06 ENCOUNTER — OFFICE VISIT (OUTPATIENT)
Dept: INTERNAL MEDICINE CLINIC | Facility: CLINIC | Age: 68
End: 2023-11-06
Payer: MEDICARE

## 2023-11-06 VITALS
HEART RATE: 70 BPM | SYSTOLIC BLOOD PRESSURE: 126 MMHG | DIASTOLIC BLOOD PRESSURE: 80 MMHG | WEIGHT: 260 LBS | BODY MASS INDEX: 38.51 KG/M2 | OXYGEN SATURATION: 94 % | HEIGHT: 69 IN

## 2023-11-06 DIAGNOSIS — M54.50 ACUTE LEFT-SIDED LOW BACK PAIN WITHOUT SCIATICA: Primary | ICD-10-CM

## 2023-11-06 DIAGNOSIS — M54.50 ACUTE LEFT-SIDED LOW BACK PAIN WITHOUT SCIATICA: ICD-10-CM

## 2023-11-06 PROCEDURE — 99214 OFFICE O/P EST MOD 30 MIN: CPT | Performed by: INTERNAL MEDICINE

## 2023-11-06 PROCEDURE — 72100 X-RAY EXAM L-S SPINE 2/3 VWS: CPT

## 2023-11-06 RX ORDER — METHYLPREDNISOLONE 4 MG/1
TABLET ORAL
Qty: 21 EACH | Refills: 0 | Status: SHIPPED | OUTPATIENT
Start: 2023-11-06

## 2023-11-06 RX ORDER — SIMVASTATIN 40 MG
40 TABLET ORAL
Qty: 90 TABLET | Refills: 1 | Status: SHIPPED | OUTPATIENT
Start: 2023-11-06

## 2023-11-06 RX ORDER — LOSARTAN POTASSIUM AND HYDROCHLOROTHIAZIDE 25; 100 MG/1; MG/1
1 TABLET ORAL DAILY
Qty: 90 TABLET | Refills: 1 | Status: SHIPPED | OUTPATIENT
Start: 2023-11-06

## 2023-11-06 NOTE — PROGRESS NOTES
Assessment/Plan:     Most likely musculoskeletal.  Lower spine but also possibly left SI joint. We will order a formal x-ray of her lumbar spine to make sure there is no significant pathology such as fracture. Try her with a Medrol Dosepak. Continue PT. Quality Measures:       Return if symptoms worsen or fail to improve. No problem-specific Assessment & Plan notes found for this encounter. Diagnoses and all orders for this visit:    Acute left-sided low back pain without sciatica  -     XR spine lumbar 2 or 3 views injury; Future  -     methylPREDNISolone 4 MG tablet therapy pack; Use as directed on package          Subjective:      Patient ID: Catrachito Hernandez is a 70 y.o. female. Patient comes in today complaining of an ongoing issue with pain in her lower back to the left of center. Denies any specific injury. She thought it was because of her ongoing knee pain issues and thought she was starting to tilt. She has been going to physical therapy and they have been working on her back as well but not really improving. The pain does not radiate. No urinary complaints with that. Besides the physical therapy, she has not really tried anything else.         ALLERGIES:  Allergies   Allergen Reactions   • Ciprofloxacin Other (See Comments)     Possible detached retina  Possible detached retina       CURRENT MEDICATIONS:    Current Outpatient Medications:   •  Ascorbic Acid (VITAMIN C PO), Take by mouth, Disp: , Rfl:   •  atenolol (TENORMIN) 50 mg tablet, Take 2 tablets (100 mg total) by mouth daily at bedtime, Disp: 180 tablet, Rfl: 1  •  cholecalciferol (VITAMIN D3) 1,000 units tablet, Take 1,000 Units by mouth daily at bedtime, Disp: , Rfl:   •  losartan-hydrochlorothiazide (HYZAAR) 100-25 MG per tablet, TAKE 1 TABLET BY MOUTH DAILY, Disp: 90 tablet, Rfl: 1  •  metFORMIN (GLUCOPHAGE-XR) 500 mg 24 hr tablet, Take 1 tablet (500 mg total) by mouth daily with dinner, Disp: 90 tablet, Rfl: 1  • methenamine hippurate (HIPREX) 1 g tablet, Take 1 g by mouth daily, Disp: , Rfl:   •  methylPREDNISolone 4 MG tablet therapy pack, Use as directed on package, Disp: 21 each, Rfl: 0  •  multivitamin (THERAGRAN) TABS, Take 1 tablet by mouth daily, Disp: , Rfl:   •  prednisoLONE acetate (PRED FORTE) 1 % ophthalmic suspension, daily, Disp: , Rfl: 1  •  simvastatin (ZOCOR) 40 mg tablet, TAKE 1 TABLET BY MOUTH DAILY AT  BEDTIME, Disp: 90 tablet, Rfl: 1  •  vitamin B-12 (VITAMIN B-12) 1,000 mcg tablet, Take 1,000 mcg by mouth daily, Disp: , Rfl:     ACTIVE PROBLEM LIST:  Patient Active Problem List   Diagnosis   • Arthritis   • Benign essential hypertension   • Detached retina, left   • Mixed hyperlipidemia   • Type 2 diabetes mellitus without complication, without long-term current use of insulin (HCC)   • Varicose veins of lower extremity   • Thyroid nodule   • Obesity, morbid (HCC)   • Fall   • Closed nondisplaced fracture of head of left radius       PAST MEDICAL HISTORY:  Past Medical History:   Diagnosis Date   • Arthritis    • Cataract     07/16/2017   • Detached retina     left   • Diabetes mellitus ([address])    • Hyperlipidemia    • Hypertension    • Mass of arm     left arm    • Obesity    • Visual impairment        PAST SURGICAL HISTORY:  Past Surgical History:   Procedure Laterality Date   • CARPAL TUNNEL RELEASE Left    • CATARACT EXTRACTION Left    • COLONOSCOPY     • EYE SURGERY     • MASS EXCISION Left 1/24/2022    Procedure: EXCISION BIOPSY TISSUE LESION/MASS UPPER EXTREMITY;  Surgeon: Maria Victoria eD La Garza DO;  Location: [facility];  Service: General   • AZ COLONOSCOPY FLX DX W/COLLJ SPEC WHEN PFRMD N/A 8/29/2017    Procedure: COLONOSCOPY;  Surgeon: Nnamdi Rosas MD;  Location: [facility];   Service: Gastroenterology   • RETINAL DETACHMENT SURGERY Left        FAMILY HISTORY:  Family History   Problem Relation Age of Onset   • Coronary artery disease Mother    • Diabetes Mother    • COPD Father    • Coronary artery disease Brother    • Valvular heart disease Brother    • Cancer Daughter        SOCIAL HISTORY:  Social History     Socioeconomic History   • Marital status:      Spouse name: Not on file   • Number of children: 2   • Years of education: Not on file   • Highest education level: Not on file   Occupational History   • Occupation:       Comment: retired    Tobacco Use   • Smoking status: Never   • Smokeless tobacco: Never   Vaping Use   • Vaping Use: Never used   Substance and Sexual Activity   • Alcohol use: Not Currently     Comment: RARELY,   • Drug use: Never   • Sexual activity: Not Currently     Partners: Male     Birth control/protection: None   Other Topics Concern   • Not on file   Social History Narrative    Brushes teeth twice a day    Dental care    Engages in instrumental music     Exercise: Yard work     Exercises reg,      Social Determinants of Health     Financial Resource Strain: Low Risk  (8/20/2023)    Overall Financial Resource Strain (CARDIA)    • Difficulty of Paying Living Expenses: Not hard at all   Food Insecurity: Not on file   Transportation Needs: No Transportation Needs (8/20/2023)    PRAPARE - Transportation    • Lack of Transportation (Medical): No    • Lack of Transportation (Non-Medical): No   Physical Activity: Not on file   Stress: Not on file   Social Connections: Not on file   Intimate Partner Violence: Not on file   Housing Stability: Not on file       Review of Systems   Constitutional:  Negative for fever. Musculoskeletal:  Positive for back pain and gait problem. Objective:  Vitals:    11/06/23 1144   BP: 126/80   BP Location: Left arm   Patient Position: Sitting   Cuff Size: Large   Pulse: 70   SpO2: 94%   Weight: 118 kg (260 lb)   Height: 5' 9" (1.753 m)     Body mass index is 38.4 kg/m². Physical Exam  Vitals and nursing note reviewed. Musculoskeletal:         General: Tenderness (Left Lower paraspinal) present. RESULTS:  Hemoglobin A1C   Date/Time Value Ref Range Status   09/19/2023 09:07 AM 6.9 (H) Normal 4.0-5.6%; PreDiabetic 5.7-6.4%; Diabetic >=6.5%; Glycemic control for adults with diabetes <7.0% % Final     Cholesterol   Date/Time Value Ref Range Status   08/14/2023 09:45  See Comment mg/dL Final     Comment:     Cholesterol:         Pediatric <18 Years        Desirable          <170 mg/dL      Borderline High    170-199 mg/dL      High               >=200 mg/dL        Adult >=18 Years            Desirable        <200 mg/dL      Borderline High  200-239 mg/dL      High             >239 mg/dL       Triglycerides   Date/Time Value Ref Range Status   08/14/2023 09:45  (H) See Comment mg/dL Final     Comment:     Triglyceride:     0-9Y            <75mg/dL     10Y-17Y         <90 mg/dL       >=18Y     Normal          <150 mg/dL     Borderline High 150-199 mg/dL     High            200-499 mg/dL        Very High       >499 mg/dL    Specimen collection should occur prior to N-Acetylcysteine or Metamizole administration due to the potential for falsely depressed results. HDL, Direct   Date/Time Value Ref Range Status   08/14/2023 09:45 AM 35 (L) >=50 mg/dL Final     Comment:     Specimen collection should occur prior to Metamizole administration due to the potential for falsley depressed results. LDL Calculated   Date/Time Value Ref Range Status   08/14/2023 09:45 AM 41 0 - 100 mg/dL Final     Comment:     LDL Cholesterol:     Optimal           <100 mg/dl     Near Optimal      100-129 mg/dl     Above Optimal       Borderline High 130-159 mg/dl       High            160-189 mg/dl       Very High       >189 mg/dl         This screening LDL is a calculated result. It does not have the accuracy of the Direct Measured LDL in the monitoring of patients with hyperlipidemia and/or statin therapy. Direct Measure LDL (ESA595) must be ordered separately in these patients.      Hemoglobin   Date/Time Value Ref Range Status   08/14/2023 09:45 AM 14.6 11.5 - 15.4 g/dL Final     Hematocrit   Date/Time Value Ref Range Status   08/14/2023 09:45 AM 43.1 34.8 - 46.1 % Final     Platelets   Date/Time Value Ref Range Status   08/14/2023 09:45  149 - 390 Thousands/uL Final     Sodium   Date/Time Value Ref Range Status   09/19/2023 09:07  135 - 147 mmol/L Final     BUN   Date/Time Value Ref Range Status   09/19/2023 09:07 AM 18 5 - 25 mg/dL Final     Creatinine   Date/Time Value Ref Range Status   09/19/2023 09:07 AM 0.60 0.60 - 1.30 mg/dL Final     Comment:     Standardized to IDMS reference method      In chart    This note was created with voice recognition software. Phonic, grammatical and spelling errors may be present within the note as a result.

## 2023-11-10 ENCOUNTER — OFFICE VISIT (OUTPATIENT)
Dept: PHYSICAL THERAPY | Facility: CLINIC | Age: 68
End: 2023-11-10
Payer: MEDICARE

## 2023-11-10 DIAGNOSIS — M17.0 PRIMARY OSTEOARTHRITIS OF BOTH KNEES: Primary | ICD-10-CM

## 2023-11-10 PROCEDURE — 97140 MANUAL THERAPY 1/> REGIONS: CPT | Performed by: PHYSICAL THERAPIST

## 2023-11-10 PROCEDURE — 97110 THERAPEUTIC EXERCISES: CPT | Performed by: PHYSICAL THERAPIST

## 2023-11-10 NOTE — PROGRESS NOTES
Daily Note     Today's date: 11/10/2023  Patient name: Alaina Obrien  : 1955  MRN: [id number]  Referring provider: Mateusz Cid PA-C  Dx: No diagnosis found. Subjective: Pt reports that her L low back pain had increased last week. It had gotten so bad that she went to her PCP and received a steroid pack. She states that this has helped with her pain but she still has some. She reports that she had to stop her leg exercises due to the pain. Objective: See treatment diary below      Assessment: Tolerated treatment well. Assessed low back as this is now prohibiting the LE exercise. This also could be contributing to the knee pain. Pain with ambulation with L LE stance phase. L SB and flex increased pain. PAIVM revealed hypomobility at L4. CPA mobs resolved hypomobility. L SB increased motion and less pain. No pain with gait following or flex. Discussed how to maintain proper lumbar spine position for sidelying hip abd for home. Patient would benefit from continued PT      Plan: Continue per plan of care. Progress treatment as tolerated. Precautions: HTN    POC expires Unit limit Auth Expiration date PT/OT + Visit Limit?   23 6 combo No BOMN                               Therapeutic exercise = (TE)  Therapeutic activity= (TA)  Manual Therapy= (MT)  Neuromuscular re-education- (NRE)  Gait training= (GT)    TE 1. Sidelying hip abd- 1x2 RIR ~20 reps  *step up on the L- 97% improved reported by patient  *gait- no pain  TE 2. Pt education- POC, pathoanatomy, HEP    10/19: Step up on L, SLS  TE 1. Sidelying hip abd- HEP version 1x20, 1x15  *step up- no change  TE 2. Sidelying hip abd- 1x28  *step up- 2/10 pain improved stability  TE 3. Sidelying hip abd- YTB 1x15  *step up - no change  TE 4. SLR- RTB 1x15  *step up- no change  TE 5. LAQ- 7.5# 25x  *step up- 1/10 pain and diffuse knee pain became more localized to medial patella   TE 6.  LAQ- 7.5# 25x  *step up- no further improvement    11/3: step up on L  *step up- no pain but unsteady on L  *step down- pain and lack of ecc control on the L   TE 1. Pt education as above  MT 2. Reassessment of knee ROM- WNL without pain with OP  NRE 4. Romberg assessment- 30s+ in all facets, increased sway in condition 4  NRE 5. Foam feet together vestibular challenge- able to complete but increased sway  NRE 6. Assessment of gait with head turns- more so related to fast walking and trendelenburg rather than head turning  TE 7. Sidelying hip abd- RTB above the knee 1x20  NRE 8. Glute bridge with post tilt and BTB around the knees- cueing for glute activation only 1x10x5"  *step down- 50% improved    11/10:  TE 1. Assessment of lumbar spine- gait 1/10 with L stance phase and L sb of trunk in this position, Flex 1/10, L Sb 2/10 and 50% motion, PAIVM revealed hypomobility at L4 CPA, sidelying hip abd hip hike noticed- no back pain  MT 2.  CPA jt mobs- gd IV- IV+ with Caudally directed force- 15'  *L SB 1/10- 100%  *Flex- 0/10 100%  *gait no pain  NRE 3. Education on proper form for sidelying hip abd to not activate the lumbar spine into SB

## 2023-11-17 ENCOUNTER — OFFICE VISIT (OUTPATIENT)
Dept: PHYSICAL THERAPY | Facility: CLINIC | Age: 68
End: 2023-11-17
Payer: MEDICARE

## 2023-11-17 DIAGNOSIS — M17.0 PRIMARY OSTEOARTHRITIS OF BOTH KNEES: Primary | ICD-10-CM

## 2023-11-17 PROCEDURE — 97110 THERAPEUTIC EXERCISES: CPT | Performed by: PHYSICAL THERAPIST

## 2023-11-17 NOTE — PROGRESS NOTES
PT Re-evaluation     Today's date: 2023  Patient name: Alaina Obrien  : 1955  MRN: [id number]  Referring provider: Mateusz Cid PA-C  Dx:   Encounter Diagnosis     ICD-10-CM    1. Primary osteoarthritis of both knees  M17.0                      Assessment  Assessment details: Pt is a 71 y/o female who presents to physical therapy with primary nociceptive pain associated with L knee OA in the environment of reduced hip strength complicated by chronicity of symptoms. Pt does not present with any red flag symptoms at this time. Pt has made good improvement since beginning physical therapy. She is now able to tolerate going up stairs with reciprocal gait with minimal to no pain. She still does demonstrate some hip weakness with this but it is improved. She continues to have significant difficulty with stepping down. Quad strengthening continues to be required. Pt would benefit from skilled physical therapy in order to decrease deficits and return to prior level of function. Impairments: abnormal gait, abnormal or restricted ROM, activity intolerance, impaired physical strength and pain with function    Symptom irritability: lowUnderstanding of Dx/Px/POC: good  Goals  STG (2-3 weeks):  Pt will be independent with HEP. - MET  Pt will demonstrate increase in MMT grade by 1/3 for hip abd. - MET    LTG (6 weeks): - Ongoing  FOTO will be expected outcome. Pt will report no pain with stair negotiation with reciprocal pattern. Plan  Patient would benefit from: skilled physical therapy  Planned modality interventions: cryotherapy  Planned therapy interventions: manual therapy, neuromuscular re-education, patient education, self care, strengthening, stretching, therapeutic activities, therapeutic exercise and home exercise program  Frequency: 1-2x/week.   Duration in weeks: 6  Treatment plan discussed with: patient    Subjective Evaluation    History of Present Illness  Mechanism of injury: Chief Complaint: Pt reports that she began having b/l knee pain 5 years ago. She states that it continued to increase until 2 years ago until she went to get it looked at. Sports med MD administered CSI 1x into each knee and within 20 mins she had full relief of symptoms. She states that 7 weeks later she got gel injections, lasted 3-6 months and then CSI and gel, lasted about 6 months. Most recent gel injection 3.5 weeks ago. Each time the pain comes back, pt reports her symptoms are of gradual onset. RE (11/17): Pt reports that her symptoms are improving. Her knees, R foot and back all feel better. Her balance is improving as well. She has been consistent with HEP.      Severity: low  Irritability: low  Nature:nociceptive  Stage: chronic  Stability: evolving    P1: see body chart    Occupation: Retired  Patient Goals  Patient goal: be able to go up and down stairs with reciprocal pattern    Objective     Observations     Additional Observation Details  Reduced L knee ext in standing  Slight offloading of the L LE in quiet standing    Stairs up:  R LE some difficulty no pain  L LE no pain    Stairs down:  R LE turned but no pain  L LE pain at a 4/10 and lack of eccentric control    Gait: pt reported 1/10 pain in the L medial knee    SLS: L LE no pain but slight trendelenburg on the L, increased sway on the L    Neurological Testing     Additional Neurological Details  Light touch intact throughout the foot, except slight reduction in the plantar aspect of the L heel    Lumbar ROM screen- WNL and no recreation of symptoms with OP in straight plane motions    Vision: good ability to track with both eyes, peripheral vision WNL- reports of wavy vision but no areas of the vision that are blacked out    Active Range of Motion   Left Knee   Normal active range of motion    Right Knee   Normal active range of motion    Passive Range of Motion   Left Knee   Normal passive range of motion    Right Knee   Normal passive range of motion    Additional Passive Range of Motion Details  No recreation of symptoms with OP into flexion    Strength/Myotome Testing     Left Knee   Flexion: 5  Extension: 4+    Right Knee   Flexion: 5  Extension: 5    Additional Strength Details  Hip:  Bridge- SL better on the L compared to the R  Abd: 4/5 R, 4-/5 L           Precautions: HTN    POC expires Unit limit Auth Expiration date PT/OT + Visit Limit?   12/14/23 6 combo No BOMN                               Therapeutic exercise = (TE)  Therapeutic activity= (TA)  Manual Therapy= (MT)  Neuromuscular re-education- (NRE)  Gait training= (GT)    TE 1. Sidelying hip abd- 1x2 RIR ~20 reps  *step up on the L- 97% improved reported by patient  *gait- no pain  TE 2. Pt education- POC, pathoanatomy, HEP    11/10:  TE 1. Assessment of lumbar spine- gait 1/10 with L stance phase and L sb of trunk in this position, Flex 1/10, L Sb 2/10 and 50% motion, PAIVM revealed hypomobility at L4 CPA, sidelying hip abd hip hike noticed- no back pain  MT 2. CPA jt mobs- gd IV- IV+ with Caudally directed force- 15'  *L SB 1/10- 100%  *Flex- 0/10 100%  *gait no pain  NRE 3. Education on proper form for sidelying hip abd to not activate the lumbar spine into SB    11/17:  TE 1. Reassessment  TE 2. LAQ 10# 2x25  TE 3.  Mini squat- HEP

## 2023-12-01 ENCOUNTER — OFFICE VISIT (OUTPATIENT)
Dept: PHYSICAL THERAPY | Facility: CLINIC | Age: 68
End: 2023-12-01
Payer: MEDICARE

## 2023-12-01 DIAGNOSIS — M17.0 PRIMARY OSTEOARTHRITIS OF BOTH KNEES: Primary | ICD-10-CM

## 2023-12-01 PROCEDURE — 97110 THERAPEUTIC EXERCISES: CPT | Performed by: PHYSICAL THERAPIST

## 2023-12-01 PROCEDURE — 97112 NEUROMUSCULAR REEDUCATION: CPT | Performed by: PHYSICAL THERAPIST

## 2023-12-01 NOTE — PROGRESS NOTES
PT Discharge     Today's date: 2023  Patient name: Teena Gonzalez  : 1955  MRN: [id number]  Referring provider: Roni Ny PA-C  Dx:   Encounter Diagnosis     ICD-10-CM    1. Primary osteoarthritis of both knees  M17.0                      Assessment  Assessment details: Pt is a 69 y/o female who presents to physical therapy with primary nociceptive pain associated with L knee OA in the environment of reduced hip strength complicated by chronicity of symptoms. Pt does not present with any red flag symptoms at this time. Pt has made good improvement since beginning physical therapy. She is now able to tolerate going up stairs with reciprocal gait with minimal to no pain. Progress is slowing, and HEP is complete. Therefore, PT and pt discussed d/c to HEP for now. Pt to come back in the future for further progressions. Impairments: abnormal gait, abnormal or restricted ROM, activity intolerance, impaired physical strength and pain with function    Symptom irritability: lowUnderstanding of Dx/Px/POC: good  Goals  STG (2-3 weeks):  Pt will be independent with HEP. - MET  Pt will demonstrate increase in MMT grade by 1/3 for hip abd. - MET    LTG (6 weeks): Jose Hipps will be expected outcome. - NOT MET  Pt will report no pain with stair negotiation with reciprocal pattern. - MET 80% of the time      Plan  D/c    Subjective Evaluation    History of Present Illness  Mechanism of injury: Chief Complaint: Pt reports that she began having b/l knee pain 5 years ago. She states that it continued to increase until 2 years ago until she went to get it looked at. Sports med MD administered CSI 1x into each knee and within 20 mins she had full relief of symptoms. She states that 7 weeks later she got gel injections, lasted 3-6 months and then CSI and gel, lasted about 6 months. Most recent gel injection 3.5 weeks ago. Each time the pain comes back, pt reports her symptoms are of gradual onset. RE ():  Pt reports that her symptoms are improving. Her knees, R foot and back all feel better. Her balance is improving as well. She has been consistent with HEP.      Severity: low  Irritability: low  Nature:nociceptive  Stage: chronic  Stability: evolving    P1: see body chart    Occupation: Retired  Patient Goals  Patient goal: be able to go up and down stairs with reciprocal pattern    Objective     Observations     Additional Observation Details  Reduced L knee ext in standing  Slight offloading of the L LE in quiet standing    Stairs up:  R LE some difficulty no pain  L LE no pain    Stairs down:  R LE turned but no pain  L LE pain at a 4/10 and lack of eccentric control    Gait: pt reported 1/10 pain in the L medial knee    SLS: L LE no pain but slight trendelenburg on the L, increased sway on the L    Neurological Testing     Additional Neurological Details  Light touch intact throughout the foot, except slight reduction in the plantar aspect of the L heel    Lumbar ROM screen- WNL and no recreation of symptoms with OP in straight plane motions    Vision: good ability to track with both eyes, peripheral vision WNL- reports of wavy vision but no areas of the vision that are blacked out    Active Range of Motion   Left Knee   Normal active range of motion    Right Knee   Normal active range of motion    Passive Range of Motion   Left Knee   Normal passive range of motion    Right Knee   Normal passive range of motion    Additional Passive Range of Motion Details  No recreation of symptoms with OP into flexion    Strength/Myotome Testing     Left Knee   Flexion: 5  Extension: 5    Right Knee   Flexion: 5  Extension: 5    Additional Strength Details  Hip:  Bridge- SL better on the L compared to the R  Abd: 4/5 R, 4/5 L           Precautions: HTN    POC expires Unit limit Auth Expiration date PT/OT + Visit Limit?   12/14/23 6 combo No BOMN                               Therapeutic exercise = (TE)  Therapeutic activity= (TA)  Manual Therapy= (MT)  Neuromuscular re-education- (NRE)  Gait training= (GT)    TE 1. Sidelying hip abd- 1x2 RIR ~20 reps  *step up on the L- 97% improved reported by patient  *gait- no pain  TE 2. Pt education- POC, pathoanatomy, HEP    11/10:  TE 1. Assessment of lumbar spine- gait 1/10 with L stance phase and L sb of trunk in this position, Flex 1/10, L Sb 2/10 and 50% motion, PAIVM revealed hypomobility at L4 CPA, sidelying hip abd hip hike noticed- no back pain  MT 2. CPA jt mobs- gd IV- IV+ with Caudally directed force- 15'  *L SB 1/10- 100%  *Flex- 0/10 100%  *gait no pain  NRE 3. Education on proper form for sidelying hip abd to not activate the lumbar spine into SB    11/17:  TE 1. Reassessment  TE 2. LAQ 10# 2x25  TE 3. Mini squat- HEP    11/30:  TE 1. Reassessment of knee- 30  TE 2. Reassessment of HEP- sidelying hip abd, bridge- cueing for proper technique 15'  NRE 3.  Progression of balance exercises - tandem walking, tandem walking with EC, backwards walking- 10'

## 2023-12-12 ENCOUNTER — TELEPHONE (OUTPATIENT)
Dept: ADMINISTRATIVE | Facility: OTHER | Age: 68
End: 2023-12-12

## 2023-12-12 NOTE — TELEPHONE ENCOUNTER
----- Message from Janet Louie LPN sent at 34/40/1294 11:09 AM EST -----  Regarding: DM Eye Exam  12/12/23 11:09 AM    Hello, our patient Connie Mathis has had Diabetic Eye Exam completed/performed. Please assist in updating the patient chart by pulling the document from the Media Tab. The date of service is 11/07/2023.      Thank you,  Janet Louie LPN  [facility]

## 2023-12-12 NOTE — TELEPHONE ENCOUNTER
Upon review of the In Basket request we have found/obtained the documentation. After careful review of the document we are unable to complete this request for Diabetic Eye Exam because the documentation does not have the result(s) needed to close the requested care gap(s). Any additional questions or concerns should be emailed to the Practice Liaisons via the appropriate education email address, please do not reply via In Basket.     Thank you  Ricky Puente MA

## 2023-12-13 ENCOUNTER — APPOINTMENT (OUTPATIENT)
Dept: LAB | Facility: CLINIC | Age: 68
End: 2023-12-13
Payer: MEDICARE

## 2023-12-13 DIAGNOSIS — E11.9 TYPE 2 DIABETES MELLITUS WITHOUT COMPLICATION, WITHOUT LONG-TERM CURRENT USE OF INSULIN (HCC): ICD-10-CM

## 2023-12-13 LAB
ALBUMIN SERPL BCP-MCNC: 4.3 G/DL (ref 3.5–5)
ALP SERPL-CCNC: 51 U/L (ref 34–104)
ALT SERPL W P-5'-P-CCNC: 18 U/L (ref 7–52)
ANION GAP SERPL CALCULATED.3IONS-SCNC: 7 MMOL/L
AST SERPL W P-5'-P-CCNC: 18 U/L (ref 13–39)
BASOPHILS # BLD AUTO: 0.04 THOUSANDS/ÂΜL (ref 0–0.1)
BASOPHILS NFR BLD AUTO: 1 % (ref 0–1)
BILIRUB SERPL-MCNC: 1.85 MG/DL (ref 0.2–1)
BUN SERPL-MCNC: 14 MG/DL (ref 5–25)
CALCIUM SERPL-MCNC: 9.3 MG/DL (ref 8.4–10.2)
CHLORIDE SERPL-SCNC: 101 MMOL/L (ref 96–108)
CHOLEST SERPL-MCNC: 135 MG/DL
CO2 SERPL-SCNC: 31 MMOL/L (ref 21–32)
CREAT SERPL-MCNC: 0.52 MG/DL (ref 0.6–1.3)
EOSINOPHIL # BLD AUTO: 0.11 THOUSAND/ÂΜL (ref 0–0.61)
EOSINOPHIL NFR BLD AUTO: 2 % (ref 0–6)
ERYTHROCYTE [DISTWIDTH] IN BLOOD BY AUTOMATED COUNT: 13.8 % (ref 11.6–15.1)
EST. AVERAGE GLUCOSE BLD GHB EST-MCNC: 154 MG/DL
GFR SERPL CREATININE-BSD FRML MDRD: 97 ML/MIN/1.73SQ M
GLUCOSE P FAST SERPL-MCNC: 138 MG/DL (ref 65–99)
HBA1C MFR BLD: 7 %
HCT VFR BLD AUTO: 42 % (ref 34.8–46.1)
HDLC SERPL-MCNC: 38 MG/DL
HGB BLD-MCNC: 14.1 G/DL (ref 11.5–15.4)
IMM GRANULOCYTES # BLD AUTO: 0.01 THOUSAND/UL (ref 0–0.2)
IMM GRANULOCYTES NFR BLD AUTO: 0 % (ref 0–2)
LDLC SERPL CALC-MCNC: 38 MG/DL (ref 0–100)
LYMPHOCYTES # BLD AUTO: 2.08 THOUSANDS/ÂΜL (ref 0.6–4.47)
LYMPHOCYTES NFR BLD AUTO: 32 % (ref 14–44)
MCH RBC QN AUTO: 29.4 PG (ref 26.8–34.3)
MCHC RBC AUTO-ENTMCNC: 33.6 G/DL (ref 31.4–37.4)
MCV RBC AUTO: 88 FL (ref 82–98)
MONOCYTES # BLD AUTO: 0.48 THOUSAND/ÂΜL (ref 0.17–1.22)
MONOCYTES NFR BLD AUTO: 7 % (ref 4–12)
NEUTROPHILS # BLD AUTO: 3.79 THOUSANDS/ÂΜL (ref 1.85–7.62)
NEUTS SEG NFR BLD AUTO: 58 % (ref 43–75)
NONHDLC SERPL-MCNC: 97 MG/DL
NRBC BLD AUTO-RTO: 0 /100 WBCS
PLATELET # BLD AUTO: 198 THOUSANDS/UL (ref 149–390)
PMV BLD AUTO: 12 FL (ref 8.9–12.7)
POTASSIUM SERPL-SCNC: 3.9 MMOL/L (ref 3.5–5.3)
PROT SERPL-MCNC: 6.7 G/DL (ref 6.4–8.4)
RBC # BLD AUTO: 4.8 MILLION/UL (ref 3.81–5.12)
SODIUM SERPL-SCNC: 139 MMOL/L (ref 135–147)
TRIGL SERPL-MCNC: 296 MG/DL
WBC # BLD AUTO: 6.51 THOUSAND/UL (ref 4.31–10.16)

## 2023-12-13 PROCEDURE — 80053 COMPREHEN METABOLIC PANEL: CPT

## 2023-12-13 PROCEDURE — 36415 COLL VENOUS BLD VENIPUNCTURE: CPT

## 2023-12-13 PROCEDURE — 85025 COMPLETE CBC W/AUTO DIFF WBC: CPT

## 2023-12-13 PROCEDURE — 80061 LIPID PANEL: CPT

## 2023-12-13 PROCEDURE — 83036 HEMOGLOBIN GLYCOSYLATED A1C: CPT

## 2023-12-22 ENCOUNTER — RA CDI HCC (OUTPATIENT)
Dept: OTHER | Facility: HOSPITAL | Age: 68
End: 2023-12-22

## 2023-12-22 NOTE — PROGRESS NOTES
HCC coding opportunities          Chart Reviewed number of suggestions sent to Provider: 1     Patients Insurance   E11.36  Medicare Insurance: Medicare

## 2023-12-29 ENCOUNTER — TELEPHONE (OUTPATIENT)
Dept: ADMINISTRATIVE | Facility: OTHER | Age: 68
End: 2023-12-29

## 2023-12-29 ENCOUNTER — OFFICE VISIT (OUTPATIENT)
Dept: INTERNAL MEDICINE CLINIC | Facility: CLINIC | Age: 68
End: 2023-12-29
Payer: MEDICARE

## 2023-12-29 VITALS
TEMPERATURE: 98.6 F | RESPIRATION RATE: 18 BRPM | SYSTOLIC BLOOD PRESSURE: 126 MMHG | WEIGHT: 262 LBS | HEART RATE: 78 BPM | OXYGEN SATURATION: 93 % | HEIGHT: 69 IN | DIASTOLIC BLOOD PRESSURE: 78 MMHG | BODY MASS INDEX: 38.8 KG/M2

## 2023-12-29 DIAGNOSIS — E11.9 TYPE 2 DIABETES MELLITUS WITHOUT COMPLICATION, WITHOUT LONG-TERM CURRENT USE OF INSULIN (HCC): Primary | ICD-10-CM

## 2023-12-29 DIAGNOSIS — E78.2 MIXED HYPERLIPIDEMIA: ICD-10-CM

## 2023-12-29 DIAGNOSIS — I10 BENIGN ESSENTIAL HYPERTENSION: ICD-10-CM

## 2023-12-29 PROCEDURE — 99214 OFFICE O/P EST MOD 30 MIN: CPT | Performed by: INTERNAL MEDICINE

## 2023-12-29 NOTE — TELEPHONE ENCOUNTER
----- Message from Ashley Farfan LPN sent at 12/28/2023  8:45 AM EST -----  Regarding: DM Eye Exam  12/28/23 8:45 AM    Hello, our patient Ruby Odonnell has had Diabetic Eye Exam completed/performed. Please assist in updating the patient chart by pulling the document from the Media Tab. The date of service is 11/07/2023.     Thank you,  Ashley Farfan LPN   [facility] [place]

## 2023-12-29 NOTE — TELEPHONE ENCOUNTER
Upon review of the In Basket request we have found/obtained the documentation. After careful review of the document we are unable to complete this request for Diabetic Eye Exam because the documentation does not have the result(s) needed to close the requested care gap(s). Report does not state type of eye exam performed.     Any additional questions or concerns should be emailed to the Practice Liaisons via the appropriate education email address, please do not reply via In Basket.    Thank you  Christa Heredia

## 2023-12-29 NOTE — PROGRESS NOTES
Assessment/Plan:     Her chronic problems are stable.  Keep working on the diet.  For her diabetes, continue metformin 500 mg daily with dinner.  For her hypertension continue atenolol 50 mg daily and Hyzaar 100/25 mg daily.  For her hyperlipidemia, continue simvastatin 40 mg daily.  Continue all other medications.    Continue followup with all specialists.   Continue diet and exercise.    Ordered labs for next visit.     Quality Measures:   Depression Screening and Follow-up Plan: Patient was screened for depression during today's encounter. They screened negative with a PHQ-2 score of 0.         Return in about 4 months (around 4/29/2024) for Recheck.    No problem-specific Assessment & Plan notes found for this encounter.       Diagnoses and all orders for this visit:    Type 2 diabetes mellitus without complication, without long-term current use of insulin (HCC)  -     Hemoglobin A1C; Future  -     Comprehensive metabolic panel; Future  -     CBC and differential; Future  -     Lipid Panel with Direct LDL reflex; Future    Benign essential hypertension    Mixed hyperlipidemia          Subjective:      Patient ID: Ruby Odonnell is a 68 y.o. female.    Patient comes in today for routine follow-up.  She states she is doing okay.  Her back is doing better.  Still bothers her but nothing like before.  Her knees are doing better so she is hoping to start exercising more.  Pressure is controlled.  Sugar is stable.  Cholesterol is stable.  Taking her medicines as directed.  Continues to work on the diet.  Denies any new complaints today.  No further additions to her history.        ALLERGIES:  Allergies   Allergen Reactions   • Ciprofloxacin Other (See Comments)     Possible detached retina  Possible detached retina       CURRENT MEDICATIONS:    Current Outpatient Medications:   •  Ascorbic Acid (VITAMIN C PO), Take by mouth, Disp: , Rfl:   •  atenolol (TENORMIN) 50 mg tablet, Take 2 tablets (100 mg total) by mouth  daily at bedtime, Disp: 180 tablet, Rfl: 1  •  cholecalciferol (VITAMIN D3) 1,000 units tablet, Take 1,000 Units by mouth daily at bedtime, Disp: , Rfl:   •  losartan-hydrochlorothiazide (HYZAAR) 100-25 MG per tablet, TAKE 1 TABLET BY MOUTH DAILY, Disp: 90 tablet, Rfl: 1  •  metFORMIN (GLUCOPHAGE-XR) 500 mg 24 hr tablet, Take 1 tablet (500 mg total) by mouth daily with dinner, Disp: 90 tablet, Rfl: 1  •  methenamine hippurate (HIPREX) 1 g tablet, Take 1 g by mouth daily, Disp: , Rfl:   •  multivitamin (THERAGRAN) TABS, Take 1 tablet by mouth daily, Disp: , Rfl:   •  prednisoLONE acetate (PRED FORTE) 1 % ophthalmic suspension, daily, Disp: , Rfl: 1  •  simvastatin (ZOCOR) 40 mg tablet, TAKE 1 TABLET BY MOUTH DAILY AT  BEDTIME, Disp: 90 tablet, Rfl: 1  •  vitamin B-12 (VITAMIN B-12) 1,000 mcg tablet, Take 1,000 mcg by mouth daily, Disp: , Rfl:     ACTIVE PROBLEM LIST:  Patient Active Problem List   Diagnosis   • Arthritis   • Benign essential hypertension   • Detached retina, left   • Mixed hyperlipidemia   • Type 2 diabetes mellitus without complication, without long-term current use of insulin (HCC)   • Varicose veins of lower extremity   • Thyroid nodule   • Obesity, morbid (HCC)   • Fall   • Closed nondisplaced fracture of head of left radius       PAST MEDICAL HISTORY:  Past Medical History:   Diagnosis Date   • Arthritis    • Cataract     07/16/2017   • Detached retina     left   • Diabetes mellitus (HCC)    • Hyperlipidemia    • Hypertension    • Mass of arm     left arm    • Obesity    • Visual impairment        PAST SURGICAL HISTORY:  Past Surgical History:   Procedure Laterality Date   • CARPAL TUNNEL RELEASE Left    • CATARACT EXTRACTION Left    • COLONOSCOPY     • EYE SURGERY     • MASS EXCISION Left 1/24/2022    Procedure: EXCISION BIOPSY TISSUE LESION/MASS UPPER EXTREMITY;  Surgeon: Enrrique Heath DO;  Location: [facility];  Service: General   • FL COLONOSCOPY FLX DX W/COLLJ SPEC WHEN PFRMD N/A  8/29/2017    Procedure: COLONOSCOPY;  Surgeon: Iván Simmons MD;  Location: [facility];  Service: Gastroenterology   • RETINAL DETACHMENT SURGERY Left        FAMILY HISTORY:  Family History   Problem Relation Age of Onset   • Coronary artery disease Mother    • Diabetes Mother    • COPD Father    • Coronary artery disease Brother    • Valvular heart disease Brother    • Cancer Daughter        SOCIAL HISTORY:  Social History     Socioeconomic History   • Marital status:      Spouse name: Not on file   • Number of children: 2   • Years of education: Not on file   • Highest education level: Not on file   Occupational History   • Occupation:       Comment: retired    Tobacco Use   • Smoking status: Never   • Smokeless tobacco: Never   Vaping Use   • Vaping status: Never Used   Substance and Sexual Activity   • Alcohol use: Not Currently     Comment: RARELY,   • Drug use: Never   • Sexual activity: Not Currently     Partners: Male     Birth control/protection: None   Other Topics Concern   • Not on file   Social History Narrative    Brushes teeth twice a day    Dental care    Engages in instrumental music     Exercise:  Yard work     Exercises reg,      Social Determinants of Health     Financial Resource Strain: Low Risk  (8/20/2023)    Overall Financial Resource Strain (CARDIA)    • Difficulty of Paying Living Expenses: Not hard at all   Food Insecurity: Not on file   Transportation Needs: No Transportation Needs (8/20/2023)    PRAPARE - Transportation    • Lack of Transportation (Medical): No    • Lack of Transportation (Non-Medical): No   Physical Activity: Not on file   Stress: Not on file   Social Connections: Not on file   Intimate Partner Violence: Not on file   Housing Stability: Not on file       Review of Systems   Respiratory:  Negative for shortness of breath.    Cardiovascular:  Negative for chest pain.   Gastrointestinal:  Negative for abdominal pain.         Objective:  Vitals:     "12/29/23 0928   BP: 126/78   BP Location: Left arm   Patient Position: Sitting   Cuff Size: Large   Pulse: 78   Resp: 18   Temp: 98.6 °F (37 °C)   TempSrc: Tympanic   SpO2: 93%   Weight: 119 kg (262 lb)   Height: 5' 9\" (1.753 m)     Body mass index is 38.69 kg/m².     Physical Exam  Vitals and nursing note reviewed.   Constitutional:       Appearance: Normal appearance. She is well-developed.   Cardiovascular:      Rate and Rhythm: Normal rate and regular rhythm.      Heart sounds: Normal heart sounds.   Pulmonary:      Effort: Pulmonary effort is normal.      Breath sounds: Normal breath sounds.   Abdominal:      Palpations: Abdomen is soft.      Tenderness: There is no abdominal tenderness.   Neurological:      Mental Status: She is alert and oriented to person, place, and time.   Psychiatric:         Mood and Affect: Mood normal.         Behavior: Behavior normal.           RESULTS:  Hemoglobin A1C   Date/Time Value Ref Range Status   12/13/2023 08:00 AM 7.0 (H) Normal 4.0-5.6%; PreDiabetic 5.7-6.4%; Diabetic >=6.5%; Glycemic control for adults with diabetes <7.0% % Final     Cholesterol   Date/Time Value Ref Range Status   12/13/2023 08:00  See Comment mg/dL Final     Comment:     Cholesterol:         Pediatric <18 Years        Desirable          <170 mg/dL      Borderline High    170-199 mg/dL      High               >=200 mg/dL        Adult >=18 Years            Desirable        <200 mg/dL      Borderline High  200-239 mg/dL      High             >239 mg/dL       Triglycerides   Date/Time Value Ref Range Status   12/13/2023 08:00  (H) See Comment mg/dL Final     Comment:     Triglyceride:     0-9Y            <75mg/dL     10Y-17Y         <90 mg/dL       >=18Y     Normal          <150 mg/dL     Borderline High 150-199 mg/dL     High            200-499 mg/dL        Very High       >499 mg/dL    Specimen collection should occur prior to Metamizole administration due to the potential for falsely " depressed results.     HDL, Direct   Date/Time Value Ref Range Status   12/13/2023 08:00 AM 38 (L) >=50 mg/dL Final     LDL Calculated   Date/Time Value Ref Range Status   12/13/2023 08:00 AM 38 0 - 100 mg/dL Final     Comment:     LDL Cholesterol:     Optimal           <100 mg/dl     Near Optimal      100-129 mg/dl     Above Optimal       Borderline High 130-159 mg/dl       High            160-189 mg/dl       Very High       >189 mg/dl         This screening LDL is a calculated result.   It does not have the accuracy of the Direct Measured LDL in the monitoring of patients with hyperlipidemia and/or statin therapy.   Direct Measure LDL (IDK262) must be ordered separately in these patients.     Hemoglobin   Date/Time Value Ref Range Status   12/13/2023 08:00 AM 14.1 11.5 - 15.4 g/dL Final     Hematocrit   Date/Time Value Ref Range Status   12/13/2023 08:00 AM 42.0 34.8 - 46.1 % Final     Platelets   Date/Time Value Ref Range Status   12/13/2023 08:00  149 - 390 Thousands/uL Final     Sodium   Date/Time Value Ref Range Status   12/13/2023 08:00  135 - 147 mmol/L Final     BUN   Date/Time Value Ref Range Status   12/13/2023 08:00 AM 14 5 - 25 mg/dL Final     Creatinine   Date/Time Value Ref Range Status   12/13/2023 08:00 AM 0.52 (L) 0.60 - 1.30 mg/dL Final     Comment:     Standardized to IDMS reference method      In chart    This note was created with voice recognition software.  Phonic, grammatical and spelling errors may be present within the note as a result.

## 2024-01-05 ENCOUNTER — TELEPHONE (OUTPATIENT)
Dept: ADMINISTRATIVE | Facility: OTHER | Age: 69
End: 2024-01-05

## 2024-01-05 NOTE — TELEPHONE ENCOUNTER
----- Message from Ashley Farfan LPN sent at 1/5/2024 10:25 AM EST -----  Regarding: DM Eye Exam  01/05/24 10:25 AM    Hello, our patient Ruby Odonnell has had Diabetic Eye Exam completed/performed. Please assist in updating the patient chart by pulling the document from the Media Tab. The date of service is 11/07/2023.     Thank you,  Ashley Farfan LPN   [facility] [place]

## 2024-02-03 DIAGNOSIS — I10 BENIGN ESSENTIAL HYPERTENSION: ICD-10-CM

## 2024-02-05 RX ORDER — ATENOLOL 50 MG/1
100 TABLET ORAL
Qty: 180 TABLET | Refills: 1 | Status: SHIPPED | OUTPATIENT
Start: 2024-02-05

## 2024-02-06 ENCOUNTER — EVALUATION (OUTPATIENT)
Dept: PHYSICAL THERAPY | Facility: CLINIC | Age: 69
End: 2024-02-06
Payer: MEDICARE

## 2024-02-06 DIAGNOSIS — E11.9 TYPE 2 DIABETES MELLITUS WITHOUT COMPLICATION, WITHOUT LONG-TERM CURRENT USE OF INSULIN (HCC): ICD-10-CM

## 2024-02-06 DIAGNOSIS — M17.0 BILATERAL PRIMARY OSTEOARTHRITIS OF KNEE: Primary | ICD-10-CM

## 2024-02-06 PROCEDURE — 97110 THERAPEUTIC EXERCISES: CPT | Performed by: PHYSICAL THERAPIST

## 2024-02-06 PROCEDURE — 97161 PT EVAL LOW COMPLEX 20 MIN: CPT | Performed by: PHYSICAL THERAPIST

## 2024-02-06 RX ORDER — METFORMIN HYDROCHLORIDE 500 MG/1
500 TABLET, EXTENDED RELEASE ORAL
Qty: 90 TABLET | Refills: 1 | Status: SHIPPED | OUTPATIENT
Start: 2024-02-06

## 2024-02-06 NOTE — LETTER
2024    Karla Lopez MD  [address] [facility] [address]    Patient: Ruby Odonnell   YOB: 1955   Date of Visit: 2024     Encounter Diagnosis     ICD-10-CM    1. Bilateral primary osteoarthritis of knee  M17.0           Dear Dr. Lopez:    Thank you for your recent referral of Ruby Odonnell. Please review the attached evaluation summary from Ruby's recent visit.     Please verify that you agree with the plan of care by signing the attached order.     If you have any questions or concerns, please do not hesitate to call.     I sincerely appreciate the opportunity to share in the care of one of your patients and hope to have another opportunity to work with you in the near future.       Sincerely,    Ricky Lopes, PT      Referring Provider:      I certify that I have read the below Plan of Care and certify the need for these services furnished under this plan of treatment while under my care.                    Karla Lopez MD  [address] [facility] [address]  Via Fax: [phone]          PT Evaluation     Today's date: 2024  Patient name: Ruby Odonnell  : 1955  MRN: [id number]  Referring provider: Karla Darden PA-C  Dx:   Encounter Diagnosis     ICD-10-CM    1. Bilateral primary osteoarthritis of knee  M17.0                        Assessment  Assessment details: Pt is a 69 y/o female who presents to physical therapy with primary nociceptive pain associated with L knee OA in the environment of reduced hip strength complicated by chronicity of symptoms. Pt does not present with any red flag symptoms at this time. Pt continues to be doing much better than previous bout of therapy. Her strength, balance, and function are improved. She continues to have a deficit in balance, as well as strength that can be improved. Education provided regarding POC, prognosis and HEP, pt verablized understanding. Also, spent time  discussing using a stationary bike rather than treadmill at the gym, assessed it, and educated on total amount of cardiovascular exercise recommended (150 min/week) as well as the amount of intensity she should be trying to aim for (Zone 2) during this. Pt would benefit from skilled physical therapy in order to decrease deficits and return to prior level of function.    Impairments: abnormal gait, abnormal or restricted ROM, activity intolerance, impaired physical strength and pain with function    Symptom irritability: lowUnderstanding of Dx/Px/POC: good  Goals  STG (2-3 weeks):  Pt will be independent with HEP.  Pt will demonstrate increase in MMT grade by 1/3 for hip abd.    LTG (6 weeks):  FOTO will be expected outcome.   Pt will report return to gym with stationary bike usage >90 min/week.      Plan  Patient would benefit from: skilled physical therapy  Planned modality interventions: cryotherapy  Planned therapy interventions: manual therapy, neuromuscular re-education, patient education, self care, strengthening, stretching, therapeutic activities, therapeutic exercise and home exercise program  Frequency: 1-2x/week.  Duration in weeks: 6  Treatment plan discussed with: patient      Subjective Evaluation    History of Present Illness  Mechanism of injury: Chief Complaint: Pt reports that she began having b/l knee pain 5 years ago. She states that it continued to increase until 2 years ago until she went to get it looked at. Sports med MD administered CSI 1x into each knee and within 20 mins she had full relief of symptoms. She states that 7 weeks later she got gel injections, lasted 3-6 months and then CSI and gel, lasted about 6 months. Most recent gel injection 3.5 weeks ago. Each time the pain comes back, pt reports her symptoms are of gradual onset. Pt was seen through the beginning of December at which point her function was improved. She states that she also received CSI injections into both knees in  "mid December that largely reduced her pain. She states that since then she has been sporadic with HEP, but still continues to complete it. She states that usually CSI's begin to wear off around the 6 week quin so this is coming up for her now. She would like to return to the gym and plans on using the treadmill.     Severity: low  Irritability: low  Nature:nociceptive  Stage: chronic  Stability: evolving    P1: see body chart    Occupation: Retired  Patient Goals  Patient goal: return to gym      Objective     Observations     Additional Observation Details  Reduced L knee ext in standing  Slight offloading of the L LE in quiet standing    Stairs up:  R LE some difficulty no pain  L LE some difficulty no pain    Stairs down:  R LE turned but no pain  L LE pain at a 3/10 and lack of eccentric control    Gait: pt reported 1/10 pain in the L medial knee    SLS: L LE no pain but slight trendelenburg on the L, increased sway on the L    Neurological Testing     Additional Neurological Details  Lumbar ROM screen- WNL and no recreation of symptoms with OP in straight plane motions    Vision: good ability to track with both eyes, peripheral vision WNL- reports of wavy vision but no areas of the vision that are blacked out    Balance:  MCTSIB: 30+ on lvl 1-4, some increased sway in lvl 4 but good use of ankle strategy    SL stance:  L: 4\"  R: 1\"    Tandem walking fwd and bwd with 1 HHA      Active Range of Motion   Left Knee   Flexion: 130 degrees   Extension: 0 degrees     Right Knee   Flexion: 120 degrees   Extension: 0 degrees     Passive Range of Motion     Additional Passive Range of Motion Details  No recreation of symptoms with OP into flexion or extension  Firm end feel end range flexion bilaterally    Strength/Myotome Testing     Left Knee   Flexion: 5  Extension: 5    Right Knee   Flexion: 5  Extension: 5    Additional Strength Details  Hip:  Abd: 4/5 R, 4/5 L  Bridge- no pain feeling it in glutes           "   Precautions: HTN    POC expires Unit limit Auth Expiration date PT/OT + Visit Limit?   12/14/23 6 combo No BOMN                               Therapeutic exercise = (TE)  Therapeutic activity= (TA)  Manual Therapy= (MT)  Neuromuscular re-education- (NRE)  Gait training= (GT)    TE 1. Stationary bike- 6 min  TE 2. Pt education- Zone 2, recommended exercise level, revamp of the HEP

## 2024-02-06 NOTE — PROGRESS NOTES
PT Evaluation     Today's date: 2024  Patient name: Ruby Odonnell  : 1955  MRN: [id number]  Referring provider: Karla Darden PA-C  Dx:   Encounter Diagnosis     ICD-10-CM    1. Bilateral primary osteoarthritis of knee  M17.0                        Assessment  Assessment details: Pt is a 67 y/o female who presents to physical therapy with primary nociceptive pain associated with L knee OA in the environment of reduced hip strength complicated by chronicity of symptoms. Pt does not present with any red flag symptoms at this time. Pt continues to be doing much better than previous bout of therapy. Her strength, balance, and function are improved. She continues to have a deficit in balance, as well as strength that can be improved. Education provided regarding POC, prognosis and HEP, pt verablized understanding. Also, spent time discussing using a stationary bike rather than treadmill at the gym, assessed it, and educated on total amount of cardiovascular exercise recommended (150 min/week) as well as the amount of intensity she should be trying to aim for (Zone 2) during this. Pt would benefit from skilled physical therapy in order to decrease deficits and return to prior level of function.    Impairments: abnormal gait, abnormal or restricted ROM, activity intolerance, impaired physical strength and pain with function    Symptom irritability: lowUnderstanding of Dx/Px/POC: good  Goals  STG (2-3 weeks):  Pt will be independent with HEP.  Pt will demonstrate increase in MMT grade by 1/3 for hip abd.    LTG (6 weeks):  FOTO will be expected outcome.   Pt will report return to gym with stationary bike usage >90 min/week.      Plan  Patient would benefit from: skilled physical therapy  Planned modality interventions: cryotherapy  Planned therapy interventions: manual therapy, neuromuscular re-education, patient education, self care, strengthening, stretching, therapeutic activities, therapeutic exercise and  home exercise program  Frequency: 1-2x/week.  Duration in weeks: 6  Treatment plan discussed with: patient      Subjective Evaluation    History of Present Illness  Mechanism of injury: Chief Complaint: Pt reports that she began having b/l knee pain 5 years ago. She states that it continued to increase until 2 years ago until she went to get it looked at. Sports med MD administered CSI 1x into each knee and within 20 mins she had full relief of symptoms. She states that 7 weeks later she got gel injections, lasted 3-6 months and then CSI and gel, lasted about 6 months. Most recent gel injection 3.5 weeks ago. Each time the pain comes back, pt reports her symptoms are of gradual onset. Pt was seen through the beginning of December at which point her function was improved. She states that she also received CSI injections into both knees in mid December that largely reduced her pain. She states that since then she has been sporadic with HEP, but still continues to complete it. She states that usually CSI's begin to wear off around the 6 week quin so this is coming up for her now. She would like to return to the gym and plans on using the treadmill.     Severity: low  Irritability: low  Nature:nociceptive  Stage: chronic  Stability: evolving    P1: see body chart    Occupation: Retired  Patient Goals  Patient goal: return to gym      Objective     Observations     Additional Observation Details  Reduced L knee ext in standing  Slight offloading of the L LE in quiet standing    Stairs up:  R LE some difficulty no pain  L LE some difficulty no pain    Stairs down:  R LE turned but no pain  L LE pain at a 3/10 and lack of eccentric control    Gait: pt reported 1/10 pain in the L medial knee    SLS: L LE no pain but slight trendelenburg on the L, increased sway on the L    Neurological Testing     Additional Neurological Details  Lumbar ROM screen- WNL and no recreation of symptoms with OP in straight plane  "motions    Vision: good ability to track with both eyes, peripheral vision WNL- reports of wavy vision but no areas of the vision that are blacked out    Balance:  MCTSIB: 30+ on lvl 1-4, some increased sway in lvl 4 but good use of ankle strategy    SL stance:  L: 4\"  R: 1\"    Tandem walking fwd and bwd with 1 HHA      Active Range of Motion   Left Knee   Flexion: 130 degrees   Extension: 0 degrees     Right Knee   Flexion: 120 degrees   Extension: 0 degrees     Passive Range of Motion     Additional Passive Range of Motion Details  No recreation of symptoms with OP into flexion or extension  Firm end feel end range flexion bilaterally    Strength/Myotome Testing     Left Knee   Flexion: 5  Extension: 5    Right Knee   Flexion: 5  Extension: 5    Additional Strength Details  Hip:  Abd: 4/5 R, 4/5 L  Bridge- no pain feeling it in glutes             Precautions: HTN    POC expires Unit limit Auth Expiration date PT/OT + Visit Limit?   12/14/23 6 combo No BOMN                               Therapeutic exercise = (TE)  Therapeutic activity= (TA)  Manual Therapy= (MT)  Neuromuscular re-education- (NRE)  Gait training= (GT)    TE 1. Stationary bike- 6 min  TE 2. Pt education- Zone 2, recommended exercise level, revamp of the HEP             "

## 2024-02-23 ENCOUNTER — OFFICE VISIT (OUTPATIENT)
Dept: PHYSICAL THERAPY | Facility: CLINIC | Age: 69
End: 2024-02-23
Payer: MEDICARE

## 2024-02-23 DIAGNOSIS — M17.0 BILATERAL PRIMARY OSTEOARTHRITIS OF KNEE: Primary | ICD-10-CM

## 2024-02-23 PROCEDURE — 97110 THERAPEUTIC EXERCISES: CPT | Performed by: PHYSICAL THERAPIST

## 2024-02-23 NOTE — PROGRESS NOTES
Daily Note     Today's date: 2024  Patient name: Ruby Odonnell  : 1955  MRN: [id number]  Referring provider: Karla Darden PA-C  Dx:   Encounter Diagnosis     ICD-10-CM    1. Bilateral primary osteoarthritis of knee  M17.0                      Subjective: Pt reports that she was able to go to the gym 2x since IE and do the bike without difficulty. She reports her SL balance is improving as well.       Objective: See treatment diary below      Assessment: Discussed expected outcomes with continued strengthening. Discussed timeframe for consideration for progression to TKA and did refer to the surgeon for more information. At this time, pt doing well. Will follow-up as needed.       Plan: Follow-up as needed     Precautions: HTN    POC expires Unit limit Auth Expiration date PT/OT + Visit Limit?   23 6 combo No BOMN                               Therapeutic exercise = (TE)  Therapeutic activity= (TA)  Manual Therapy= (MT)  Neuromuscular re-education- (NRE)  Gait training= (GT)    TE 1. Stationary bike- 6 min  TE 2. Pt education- Zone 2, recommended exercise level, revamp of the HEP    :  TE 1. Education as above

## 2024-04-18 LAB
LEFT EYE DIABETIC RETINOPATHY: NORMAL
RIGHT EYE DIABETIC RETINOPATHY: NORMAL

## 2024-04-21 DIAGNOSIS — E78.2 MIXED HYPERLIPIDEMIA: ICD-10-CM

## 2024-04-21 DIAGNOSIS — I10 BENIGN ESSENTIAL HYPERTENSION: ICD-10-CM

## 2024-04-22 ENCOUNTER — APPOINTMENT (OUTPATIENT)
Dept: LAB | Facility: CLINIC | Age: 69
End: 2024-04-22
Payer: MEDICARE

## 2024-04-22 DIAGNOSIS — E11.9 TYPE 2 DIABETES MELLITUS WITHOUT COMPLICATION, WITHOUT LONG-TERM CURRENT USE OF INSULIN (HCC): ICD-10-CM

## 2024-04-22 LAB
BASOPHILS # BLD AUTO: 0.06 THOUSANDS/ÂΜL (ref 0–0.1)
BASOPHILS NFR BLD AUTO: 1 % (ref 0–1)
EOSINOPHIL # BLD AUTO: 0.15 THOUSAND/ÂΜL (ref 0–0.61)
EOSINOPHIL NFR BLD AUTO: 2 % (ref 0–6)
ERYTHROCYTE [DISTWIDTH] IN BLOOD BY AUTOMATED COUNT: 13.5 % (ref 11.6–15.1)
EST. AVERAGE GLUCOSE BLD GHB EST-MCNC: 148 MG/DL
HBA1C MFR BLD: 6.8 %
HCT VFR BLD AUTO: 43.9 % (ref 34.8–46.1)
HGB BLD-MCNC: 14.5 G/DL (ref 11.5–15.4)
IMM GRANULOCYTES # BLD AUTO: 0.02 THOUSAND/UL (ref 0–0.2)
IMM GRANULOCYTES NFR BLD AUTO: 0 % (ref 0–2)
LYMPHOCYTES # BLD AUTO: 2.07 THOUSANDS/ÂΜL (ref 0.6–4.47)
LYMPHOCYTES NFR BLD AUTO: 28 % (ref 14–44)
MCH RBC QN AUTO: 29.8 PG (ref 26.8–34.3)
MCHC RBC AUTO-ENTMCNC: 33 G/DL (ref 31.4–37.4)
MCV RBC AUTO: 90 FL (ref 82–98)
MONOCYTES # BLD AUTO: 0.54 THOUSAND/ÂΜL (ref 0.17–1.22)
MONOCYTES NFR BLD AUTO: 7 % (ref 4–12)
NEUTROPHILS # BLD AUTO: 4.62 THOUSANDS/ÂΜL (ref 1.85–7.62)
NEUTS SEG NFR BLD AUTO: 62 % (ref 43–75)
NRBC BLD AUTO-RTO: 0 /100 WBCS
PLATELET # BLD AUTO: 209 THOUSANDS/UL (ref 149–390)
PMV BLD AUTO: 12.3 FL (ref 8.9–12.7)
RBC # BLD AUTO: 4.86 MILLION/UL (ref 3.81–5.12)
WBC # BLD AUTO: 7.46 THOUSAND/UL (ref 4.31–10.16)

## 2024-04-22 PROCEDURE — 36415 COLL VENOUS BLD VENIPUNCTURE: CPT

## 2024-04-22 PROCEDURE — 80061 LIPID PANEL: CPT

## 2024-04-22 PROCEDURE — 85025 COMPLETE CBC W/AUTO DIFF WBC: CPT

## 2024-04-22 PROCEDURE — 83036 HEMOGLOBIN GLYCOSYLATED A1C: CPT

## 2024-04-22 PROCEDURE — 80053 COMPREHEN METABOLIC PANEL: CPT

## 2024-04-22 RX ORDER — LOSARTAN POTASSIUM AND HYDROCHLOROTHIAZIDE 25; 100 MG/1; MG/1
1 TABLET ORAL DAILY
Qty: 90 TABLET | Refills: 3 | Status: SHIPPED | OUTPATIENT
Start: 2024-04-22

## 2024-04-22 RX ORDER — SIMVASTATIN 40 MG
40 TABLET ORAL
Qty: 90 TABLET | Refills: 3 | Status: SHIPPED | OUTPATIENT
Start: 2024-04-22

## 2024-04-23 LAB
ALBUMIN SERPL BCP-MCNC: 4.6 G/DL (ref 3.5–5)
ALP SERPL-CCNC: 54 U/L (ref 34–104)
ALT SERPL W P-5'-P-CCNC: 20 U/L (ref 7–52)
ANION GAP SERPL CALCULATED.3IONS-SCNC: 11 MMOL/L (ref 4–13)
AST SERPL W P-5'-P-CCNC: 20 U/L (ref 13–39)
BILIRUB SERPL-MCNC: 1.89 MG/DL (ref 0.2–1)
BUN SERPL-MCNC: 16 MG/DL (ref 5–25)
CALCIUM SERPL-MCNC: 9.9 MG/DL (ref 8.4–10.2)
CHLORIDE SERPL-SCNC: 96 MMOL/L (ref 96–108)
CHOLEST SERPL-MCNC: 147 MG/DL
CO2 SERPL-SCNC: 30 MMOL/L (ref 21–32)
CREAT SERPL-MCNC: 0.57 MG/DL (ref 0.6–1.3)
GFR SERPL CREATININE-BSD FRML MDRD: 94 ML/MIN/1.73SQ M
GLUCOSE P FAST SERPL-MCNC: 143 MG/DL (ref 65–99)
HDLC SERPL-MCNC: 39 MG/DL
LDLC SERPL CALC-MCNC: 48 MG/DL (ref 0–100)
POTASSIUM SERPL-SCNC: 3.9 MMOL/L (ref 3.5–5.3)
PROT SERPL-MCNC: 7.1 G/DL (ref 6.4–8.4)
SODIUM SERPL-SCNC: 137 MMOL/L (ref 135–147)
TRIGL SERPL-MCNC: 299 MG/DL

## 2024-05-09 ENCOUNTER — OFFICE VISIT (OUTPATIENT)
Dept: INTERNAL MEDICINE CLINIC | Facility: CLINIC | Age: 69
End: 2024-05-09
Payer: MEDICARE

## 2024-05-09 ENCOUNTER — TELEPHONE (OUTPATIENT)
Age: 69
End: 2024-05-09

## 2024-05-09 VITALS
OXYGEN SATURATION: 99 % | SYSTOLIC BLOOD PRESSURE: 128 MMHG | RESPIRATION RATE: 16 BRPM | TEMPERATURE: 98 F | WEIGHT: 262 LBS | DIASTOLIC BLOOD PRESSURE: 80 MMHG | HEART RATE: 68 BPM | HEIGHT: 69 IN | BODY MASS INDEX: 38.8 KG/M2

## 2024-05-09 DIAGNOSIS — R19.7 DIARRHEA, UNSPECIFIED TYPE: Primary | ICD-10-CM

## 2024-05-09 PROCEDURE — G2211 COMPLEX E/M VISIT ADD ON: HCPCS | Performed by: PHYSICIAN ASSISTANT

## 2024-05-09 PROCEDURE — 99213 OFFICE O/P EST LOW 20 MIN: CPT | Performed by: PHYSICIAN ASSISTANT

## 2024-05-09 NOTE — TELEPHONE ENCOUNTER
FYI: Pt c/o very loose watery stools x 3-4 days.  Pt given an appointment this afternoon with JASON.  Doctor did not have any available appointments.  Pt agreeable.

## 2024-05-09 NOTE — PROGRESS NOTES
Assessment/Plan:   Diarrhea:  Patient reports feeling indigestion on Sunday, developed watery diarrhea on Monday evening.   Diarrhea has been improving.  Can use imodium. Keep hydrated     Quality Measures:       No follow-ups on file.    No problem-specific Assessment & Plan notes found for this encounter.       There are no diagnoses linked to this encounter.      Subjective:      Patient ID: Ruby Odonnell is a 69 y.o. female.    Patient reports water diarrhea x 5 on Monday and Tuesday, and x 3 on Wednesday, no diarrhea so far today. She reports she ate a salad from [employer] on Saturday evening. Woke up with indigestion on Sunday morning. No antibiotic use in the past 3 months. No recent travel. No abdominal pain pain or nausea. No blood or mucus.  No sick contacts.  She has not tried any self treatment.  Has been trying to keep hydrated with sugar-free Gatorade.  Eating bland diet.    Diarrhea   Pertinent negatives include no abdominal pain, chills or fever.       ALLERGIES:  Allergies   Allergen Reactions    Ciprofloxacin Other (See Comments)     Possible detached retina  Possible detached retina       CURRENT MEDICATIONS:    Current Outpatient Medications:     Ascorbic Acid (VITAMIN C PO), Take by mouth, Disp: , Rfl:     atenolol (TENORMIN) 50 mg tablet, TAKE 2 TABLETS BY MOUTH DAILY AT BEDTIME, Disp: 180 tablet, Rfl: 1    cholecalciferol (VITAMIN D3) 1,000 units tablet, Take 1,000 Units by mouth daily at bedtime, Disp: , Rfl:     losartan-hydrochlorothiazide (HYZAAR) 100-25 MG per tablet, TAKE 1 TABLET BY MOUTH DAILY, Disp: 90 tablet, Rfl: 3    metFORMIN (GLUCOPHAGE-XR) 500 mg 24 hr tablet, TAKE 1 TABLET BY MOUTH EVERY DAY WITH DINNER, Disp: 90 tablet, Rfl: 1    methenamine hippurate (HIPREX) 1 g tablet, Take 1 g by mouth daily, Disp: , Rfl:     multivitamin (THERAGRAN) TABS, Take 1 tablet by mouth daily, Disp: , Rfl:     prednisoLONE acetate (PRED FORTE) 1 % ophthalmic suspension, daily, Disp: , Rfl: 1     simvastatin (ZOCOR) 40 mg tablet, TAKE 1 TABLET BY MOUTH DAILY AT  BEDTIME, Disp: 90 tablet, Rfl: 3    vitamin B-12 (VITAMIN B-12) 1,000 mcg tablet, Take 1,000 mcg by mouth daily, Disp: , Rfl:     ACTIVE PROBLEM LIST:  Patient Active Problem List   Diagnosis    Arthritis    Benign essential hypertension    Detached retina, left    Mixed hyperlipidemia    Type 2 diabetes mellitus without complication, without long-term current use of insulin (HCC)    Varicose veins of lower extremity    Thyroid nodule    Obesity, morbid (HCC)    Fall    Closed nondisplaced fracture of head of left radius       PAST MEDICAL HISTORY:  Past Medical History:   Diagnosis Date    Arthritis     Cataract     07/16/2017    Detached retina     left    Diabetes mellitus (HCC)     Hyperlipidemia     Hypertension     Mass of arm     left arm     Obesity     Visual impairment        PAST SURGICAL HISTORY:  Past Surgical History:   Procedure Laterality Date    CARPAL TUNNEL RELEASE Left     CATARACT EXTRACTION Left     COLONOSCOPY      EYE SURGERY      MASS EXCISION Left 1/24/2022    Procedure: EXCISION BIOPSY TISSUE LESION/MASS UPPER EXTREMITY;  Surgeon: Enrrique Heath DO;  Location: [facility];  Service: General    AL COLONOSCOPY FLX DX W/COLLJ SPEC WHEN PFRMD N/A 8/29/2017    Procedure: COLONOSCOPY;  Surgeon: Iván Simmons MD;  Location: [facility];  Service: Gastroenterology    RETINAL DETACHMENT SURGERY Left        FAMILY HISTORY:  Family History   Problem Relation Age of Onset    Coronary artery disease Mother     Diabetes Mother     COPD Father     Coronary artery disease Brother     Valvular heart disease Brother     Cancer Daughter        SOCIAL HISTORY:  Social History     Socioeconomic History    Marital status:      Spouse name: Not on file    Number of children: 2    Years of education: Not on file    Highest education level: Not on file   Occupational History    Occupation:       Comment: retired    Tobacco  "Use    Smoking status: Never    Smokeless tobacco: Never   Vaping Use    Vaping status: Never Used   Substance and Sexual Activity    Alcohol use: Not Currently     Comment: RARELY,    Drug use: Never    Sexual activity: Not Currently     Partners: Male     Birth control/protection: None   Other Topics Concern    Not on file   Social History Narrative    Brushes teeth twice a day    Dental care    Engages in instrumental music     Exercise:  Yard work     Exercises reg,      Social Determinants of Health     Financial Resource Strain: Low Risk  (8/20/2023)    Overall Financial Resource Strain (CARDIA)     Difficulty of Paying Living Expenses: Not hard at all   Food Insecurity: Not on file   Transportation Needs: No Transportation Needs (8/20/2023)    PRAPARE - Transportation     Lack of Transportation (Medical): No     Lack of Transportation (Non-Medical): No   Physical Activity: Not on file   Stress: Not on file   Social Connections: Not on file   Intimate Partner Violence: Not on file   Housing Stability: Not on file       Review of Systems   Constitutional:  Negative for chills and fever.   Gastrointestinal:  Positive for diarrhea. Negative for abdominal pain and nausea.         Objective:  Vitals:    05/09/24 1353   BP: 128/80   BP Location: Left arm   Patient Position: Sitting   Cuff Size: Standard   Pulse: 68   Resp: 16   Temp: 98 °F (36.7 °C)   TempSrc: Tympanic   SpO2: 99%   Weight: 119 kg (262 lb)   Height: 5' 9\" (1.753 m)     Body mass index is 38.69 kg/m².     Physical Exam  Constitutional:       Appearance: Normal appearance. She is obese.   HENT:      Nose: Nose normal.   Cardiovascular:      Rate and Rhythm: Normal rate and regular rhythm.      Heart sounds: Normal heart sounds.   Pulmonary:      Effort: Pulmonary effort is normal.      Breath sounds: Normal breath sounds.   Abdominal:      Tenderness: There is no abdominal tenderness.   Skin:     General: Skin is warm and dry.   Neurological:      " General: No focal deficit present.      Mental Status: She is alert and oriented to person, place, and time.   Psychiatric:         Mood and Affect: Mood normal.         Behavior: Behavior normal.         Thought Content: Thought content normal.         Judgment: Judgment normal.           RESULTS:  Hemoglobin A1C   Date/Time Value Ref Range Status   04/22/2024 09:15 AM 6.8 (H) Normal 4.0-5.6%; PreDiabetic 5.7-6.4%; Diabetic >=6.5%; Glycemic control for adults with diabetes <7.0% % Final     Cholesterol   Date/Time Value Ref Range Status   04/22/2024 09:15  See Comment mg/dL Final     Comment:     Cholesterol:         Pediatric <18 Years        Desirable          <170 mg/dL      Borderline High    170-199 mg/dL      High               >=200 mg/dL        Adult >=18 Years            Desirable        <200 mg/dL      Borderline High  200-239 mg/dL      High             >239 mg/dL       Triglycerides   Date/Time Value Ref Range Status   04/22/2024 09:15  (H) See Comment mg/dL Final     Comment:     Triglyceride:     0-9Y            <75mg/dL     10Y-17Y         <90 mg/dL       >=18Y     Normal          <150 mg/dL     Borderline High 150-199 mg/dL     High            200-499 mg/dL        Very High       >499 mg/dL    Specimen collection should occur prior to Metamizole administration due to the potential for falsely depressed results.     HDL, Direct   Date/Time Value Ref Range Status   04/22/2024 09:15 AM 39 (L) >=50 mg/dL Final     LDL Calculated   Date/Time Value Ref Range Status   04/22/2024 09:15 AM 48 0 - 100 mg/dL Final     Comment:     LDL Cholesterol:     Optimal           <100 mg/dl     Near Optimal      100-129 mg/dl     Above Optimal       Borderline High 130-159 mg/dl       High            160-189 mg/dl       Very High       >189 mg/dl         This screening LDL is a calculated result.   It does not have the accuracy of the Direct Measured LDL in the monitoring of patients with hyperlipidemia  and/or statin therapy.   Direct Measure LDL (YPP865) must be ordered separately in these patients.     Hemoglobin   Date/Time Value Ref Range Status   04/22/2024 09:15 AM 14.5 11.5 - 15.4 g/dL Final     Hematocrit   Date/Time Value Ref Range Status   04/22/2024 09:15 AM 43.9 34.8 - 46.1 % Final     Platelets   Date/Time Value Ref Range Status   04/22/2024 09:15  149 - 390 Thousands/uL Final     Sodium   Date/Time Value Ref Range Status   04/22/2024 09:15  135 - 147 mmol/L Final     BUN   Date/Time Value Ref Range Status   04/22/2024 09:15 AM 16 5 - 25 mg/dL Final     Creatinine   Date/Time Value Ref Range Status   04/22/2024 09:15 AM 0.57 (L) 0.60 - 1.30 mg/dL Final     Comment:     Standardized to IDMS reference method      In chart    This note was created with voice recognition software.  Phonic, grammatical and spelling errors may be present within the note as a result.

## 2024-06-12 ENCOUNTER — RA CDI HCC (OUTPATIENT)
Dept: OTHER | Facility: HOSPITAL | Age: 69
End: 2024-06-12

## 2024-06-18 ENCOUNTER — OFFICE VISIT (OUTPATIENT)
Dept: INTERNAL MEDICINE CLINIC | Facility: CLINIC | Age: 69
End: 2024-06-18
Payer: MEDICARE

## 2024-06-18 VITALS
HEART RATE: 80 BPM | SYSTOLIC BLOOD PRESSURE: 124 MMHG | BODY MASS INDEX: 38.95 KG/M2 | HEIGHT: 69 IN | RESPIRATION RATE: 16 BRPM | WEIGHT: 263 LBS | OXYGEN SATURATION: 94 % | DIASTOLIC BLOOD PRESSURE: 76 MMHG

## 2024-06-18 DIAGNOSIS — E78.2 MIXED HYPERLIPIDEMIA: ICD-10-CM

## 2024-06-18 DIAGNOSIS — E11.9 TYPE 2 DIABETES MELLITUS WITHOUT COMPLICATION, WITHOUT LONG-TERM CURRENT USE OF INSULIN (HCC): ICD-10-CM

## 2024-06-18 DIAGNOSIS — I10 BENIGN ESSENTIAL HYPERTENSION: Primary | ICD-10-CM

## 2024-06-18 PROCEDURE — 99214 OFFICE O/P EST MOD 30 MIN: CPT | Performed by: INTERNAL MEDICINE

## 2024-06-18 PROCEDURE — G2211 COMPLEX E/M VISIT ADD ON: HCPCS | Performed by: INTERNAL MEDICINE

## 2024-06-18 NOTE — PROGRESS NOTES
"Ambulatory Visit  Name: Ruby Odonnell      : 1955      MRN: [id number]  Encounter Provider: Anil Ramos MD  Encounter Date: 2024   Encounter department: [facility] INTER[facility] MEDICINE [place]    Assessment & Plan   1. Benign essential hypertension  Assessment & Plan:  Controlled.  Continue atenolol and Hyzaar.  2. Mixed hyperlipidemia  Assessment & Plan:  Controlled with Zocor 40 mg daily.  Continue.  3. Type 2 diabetes mellitus without complication, without long-term current use of insulin ([facility])  Assessment & Plan:  Improving control.  Continue metformin 500 mg and keep working on diet.  Lab Results   Component Value Date    HGBA1C 6.8 (H) 2024     Orders:  -     Albumin / creatinine urine ratio; Future; Expected date: 10/16/2024  -     Comprehensive metabolic panel; Future; Expected date: 10/16/2024  -     Hemoglobin A1C; Future; Expected date: 10/16/2024  -     Lipid Panel with Direct LDL reflex; Future; Expected date: 10/16/2024       History of Present Illness     Patient comes in today for routine follow-up.  She states she is doing well with no new complaints.  Her blood pressure is controlled.  Cholesterol is controlled.  Sugar has come down further.  She has been working on her diet.  Still struggling with her weight.  But she keeps trying.  Denies any new complaints today.  No further additions to her history.        Review of Systems   Respiratory:  Negative for shortness of breath.    Cardiovascular:  Negative for chest pain.   Gastrointestinal:  Negative for abdominal pain.       Objective     /76 (BP Location: Left arm, Patient Position: Sitting, Cuff Size: Large)   Pulse 80   Resp 16   Ht 5' 9\" (1.753 m)   Wt 119 kg (263 lb)   SpO2 94%   BMI 38.84 kg/m²     Physical Exam  Vitals and nursing note reviewed.   Constitutional:       Appearance: Normal appearance. She is well-developed.   Cardiovascular:      Rate and Rhythm: Normal rate and regular rhythm.      Heart " sounds: Normal heart sounds.   Pulmonary:      Effort: Pulmonary effort is normal.      Breath sounds: Normal breath sounds.   Abdominal:      Palpations: Abdomen is soft.      Tenderness: There is no abdominal tenderness.   Neurological:      Mental Status: She is alert and oriented to person, place, and time.       Administrative Statements

## 2024-06-18 NOTE — ASSESSMENT & PLAN NOTE
Improving control.  Continue metformin 500 mg and keep working on diet.  Lab Results   Component Value Date    HGBA1C 6.8 (H) 04/22/2024

## 2024-07-07 DIAGNOSIS — I10 BENIGN ESSENTIAL HYPERTENSION: ICD-10-CM

## 2024-07-07 RX ORDER — ATENOLOL 50 MG/1
100 TABLET ORAL
Qty: 180 TABLET | Refills: 1 | Status: SHIPPED | OUTPATIENT
Start: 2024-07-07

## 2024-08-03 DIAGNOSIS — E11.9 TYPE 2 DIABETES MELLITUS WITHOUT COMPLICATION, WITHOUT LONG-TERM CURRENT USE OF INSULIN (HCC): ICD-10-CM

## 2024-08-04 RX ORDER — METFORMIN HYDROCHLORIDE 500 MG/1
500 TABLET, EXTENDED RELEASE ORAL
Qty: 90 TABLET | Refills: 1 | Status: SHIPPED | OUTPATIENT
Start: 2024-08-04

## 2024-10-07 ENCOUNTER — HOSPITAL ENCOUNTER (OUTPATIENT)
Age: 69
Discharge: HOME | End: 2024-10-07
Payer: MEDICARE

## 2024-10-07 VITALS — HEIGHT: 67 IN | BODY MASS INDEX: 40.89 KG/M2

## 2024-10-07 DIAGNOSIS — M81.0 AGE-RELATED OSTEOPOROSIS WITHOUT CURRENT PATHOLOGICAL FRACTURE: ICD-10-CM

## 2024-10-07 PROCEDURE — 77080 DXA BONE DENSITY AXIAL: CPT

## 2024-10-21 ENCOUNTER — APPOINTMENT (OUTPATIENT)
Dept: LAB | Facility: CLINIC | Age: 69
End: 2024-10-21
Payer: MEDICARE

## 2024-10-21 DIAGNOSIS — E11.9 TYPE 2 DIABETES MELLITUS WITHOUT COMPLICATION, WITHOUT LONG-TERM CURRENT USE OF INSULIN (HCC): ICD-10-CM

## 2024-10-21 LAB
ALBUMIN SERPL BCG-MCNC: 4.3 G/DL (ref 3.5–5)
ALP SERPL-CCNC: 53 U/L (ref 34–104)
ALT SERPL W P-5'-P-CCNC: 19 U/L (ref 7–52)
ANION GAP SERPL CALCULATED.3IONS-SCNC: 7 MMOL/L (ref 4–13)
AST SERPL W P-5'-P-CCNC: 21 U/L (ref 13–39)
BILIRUB SERPL-MCNC: 1.6 MG/DL (ref 0.2–1)
BUN SERPL-MCNC: 17 MG/DL (ref 5–25)
CALCIUM SERPL-MCNC: 9.2 MG/DL (ref 8.4–10.2)
CHLORIDE SERPL-SCNC: 98 MMOL/L (ref 96–108)
CHOLEST SERPL-MCNC: 144 MG/DL
CO2 SERPL-SCNC: 32 MMOL/L (ref 21–32)
CREAT SERPL-MCNC: 0.59 MG/DL (ref 0.6–1.3)
CREAT UR-MCNC: 65.9 MG/DL
EST. AVERAGE GLUCOSE BLD GHB EST-MCNC: 146 MG/DL
GFR SERPL CREATININE-BSD FRML MDRD: 93 ML/MIN/1.73SQ M
GLUCOSE P FAST SERPL-MCNC: 155 MG/DL (ref 65–99)
HBA1C MFR BLD: 6.7 %
HDLC SERPL-MCNC: 38 MG/DL
LDLC SERPL CALC-MCNC: 47 MG/DL (ref 0–100)
MICROALBUMIN UR-MCNC: 16.1 MG/L
MICROALBUMIN/CREAT 24H UR: 24 MG/G CREATININE (ref 0–30)
POTASSIUM SERPL-SCNC: 4.3 MMOL/L (ref 3.5–5.3)
PROT SERPL-MCNC: 7.1 G/DL (ref 6.4–8.4)
SODIUM SERPL-SCNC: 137 MMOL/L (ref 135–147)
TRIGL SERPL-MCNC: 296 MG/DL

## 2024-10-21 PROCEDURE — 83036 HEMOGLOBIN GLYCOSYLATED A1C: CPT

## 2024-10-21 PROCEDURE — 80053 COMPREHEN METABOLIC PANEL: CPT

## 2024-10-21 PROCEDURE — 82043 UR ALBUMIN QUANTITATIVE: CPT

## 2024-10-21 PROCEDURE — 80061 LIPID PANEL: CPT

## 2024-10-21 PROCEDURE — 82570 ASSAY OF URINE CREATININE: CPT

## 2024-10-21 PROCEDURE — 36415 COLL VENOUS BLD VENIPUNCTURE: CPT

## 2024-10-28 ENCOUNTER — RA CDI HCC (OUTPATIENT)
Dept: OTHER | Facility: HOSPITAL | Age: 69
End: 2024-10-28

## 2024-11-01 ENCOUNTER — OFFICE VISIT (OUTPATIENT)
Dept: INTERNAL MEDICINE CLINIC | Facility: CLINIC | Age: 69
End: 2024-11-01
Payer: MEDICARE

## 2024-11-01 VITALS
BODY MASS INDEX: 42.28 KG/M2 | HEART RATE: 72 BPM | OXYGEN SATURATION: 98 % | HEIGHT: 67 IN | TEMPERATURE: 98.7 F | RESPIRATION RATE: 18 BRPM | WEIGHT: 269.4 LBS | SYSTOLIC BLOOD PRESSURE: 138 MMHG | DIASTOLIC BLOOD PRESSURE: 76 MMHG

## 2024-11-01 DIAGNOSIS — E66.01 OBESITY, MORBID (HCC): ICD-10-CM

## 2024-11-01 DIAGNOSIS — I10 BENIGN ESSENTIAL HYPERTENSION: ICD-10-CM

## 2024-11-01 DIAGNOSIS — Z00.00 MEDICARE ANNUAL WELLNESS VISIT, SUBSEQUENT: Primary | ICD-10-CM

## 2024-11-01 DIAGNOSIS — E11.9 TYPE 2 DIABETES MELLITUS WITHOUT COMPLICATION, WITHOUT LONG-TERM CURRENT USE OF INSULIN (HCC): ICD-10-CM

## 2024-11-01 DIAGNOSIS — E78.2 MIXED HYPERLIPIDEMIA: ICD-10-CM

## 2024-11-01 PROBLEM — R19.7 DIARRHEA: Status: RESOLVED | Noted: 2024-05-09 | Resolved: 2024-11-01

## 2024-11-01 PROCEDURE — 99214 OFFICE O/P EST MOD 30 MIN: CPT | Performed by: INTERNAL MEDICINE

## 2024-11-01 PROCEDURE — G0439 PPPS, SUBSEQ VISIT: HCPCS | Performed by: INTERNAL MEDICINE

## 2024-11-01 NOTE — ASSESSMENT & PLAN NOTE
Continues to improve.  Continue current medication.  Keep working on diet.  Briefly discussed possibility of GLP-1's but she is still nervous about the new medicines.  Understandable.  Lab Results   Component Value Date    HGBA1C 6.7 (H) 10/21/2024       Orders:    Hemoglobin A1C; Future    Comprehensive metabolic panel; Future    CBC and differential; Future    Lipid Panel with Direct LDL reflex; Future

## 2024-11-01 NOTE — PROGRESS NOTES
Ambulatory Visit  Name: Ruby Odonnell      : 1955      MRN: [id number]  Encounter Provider: Anil Ramos MD  Encounter Date: 2024   Encounter department: [facility] INTERNAL MEDICINE [place]    Assessment & Plan  Medicare annual wellness visit, subsequent         Type 2 diabetes mellitus without complication, without long-term current use of insulin (HCC)  Continues to improve.  Continue current medication.  Keep working on diet.  Briefly discussed possibility of GLP-1's but she is still nervous about the new medicines.  Understandable.  Lab Results   Component Value Date    HGBA1C 6.7 (H) 10/21/2024       Orders:    Hemoglobin A1C; Future    Comprehensive metabolic panel; Future    CBC and differential; Future    Lipid Panel with Direct LDL reflex; Future    Mixed hyperlipidemia  Controlled.  Continue current medication.       Benign essential hypertension  Controlled.  Continue current medication.       Obesity, morbid (HCC)  Keep working on diet.            Preventive health issues were discussed with patient, and age appropriate screening tests were ordered as noted in patient's After Visit Summary. Personalized health advice and appropriate referrals for health education or preventive services given if needed, as noted in patient's After Visit Summary.    History of Present Illness     Patient comes in today for routine follow-up and Medicare wellness.  She states she is doing okay.  She is making the rounds of her specialists in the next few weeks and prior weeks.  Everything is going well.  Her blood work shows her sugars are controlled.  Cholesterol is controlled.  Blood pressure is controlled.  Taking her medicines as directed.  Continues to try to work on her weight.  Denies any new complaints today.  No further additions to her history.       Patient Care Team:  Anil Ramos MD as PCP - General  Iván Simmons MD as Endoscopist  Jeane Menon RD as Diabetes Educator (Diabetes  Services)    Review of Systems   Respiratory:  Negative for shortness of breath.    Cardiovascular:  Negative for chest pain.   Gastrointestinal:  Negative for abdominal pain.     Medical History Reviewed by provider this encounter:       Annual Wellness Visit Questionnaire   Ruby is here for her Subsequent Wellness visit. Last Medicare Wellness visit information reviewed, patient interviewed and updates made to the record today.      Health Risk Assessment:   Patient rates overall health as good. Patient feels that their physical health rating is slightly better. Patient is very satisfied with their life. Eyesight was rated as same. Hearing was rated as same. Patient feels that their emotional and mental health rating is same. Patients states they are never, rarely angry. Patient states they are never, rarely unusually tired/fatigued. Pain experienced in the last 7 days has been some. Patient's pain rating has been 1/10. Patient states that she has experienced no weight loss or gain in last 6 months.     Depression Screening:   PHQ-2 Score: 0      Fall Risk Screening:   In the past year, patient has experienced: no history of falling in past year      Urinary Incontinence Screening:   Patient has not leaked urine accidently in the last six months.     Home Safety:  Patient has trouble with stairs inside or outside of their home. Patient has working smoke alarms and has working carbon monoxide detector. Home safety hazards include: none.     Nutrition:   Current diet is Diabetic, Low Saturated Fat, No Added Salt and Limited junk food. Not really low carb, more like controlled carb    Medications:   Patient is currently taking over-the-counter supplements. OTC medications include: see medication list. Patient is able to manage medications.     Activities of Daily Living (ADLs)/Instrumental Activities of Daily Living (IADLs):   Walk and transfer into and out of bed and chair?: Yes  Dress and groom yourself?: Yes     Bathe or shower yourself?: Yes    Feed yourself? Yes  Do your laundry/housekeeping?: Yes  Manage your money, pay your bills and track your expenses?: Yes  Make your own meals?: Yes    Do your own shopping?: Yes    Previous Hospitalizations:   Any hospitalizations or ED visits within the last 12 months?: No      Advance Care Planning:   Living will: Yes    Durable POA for healthcare: Yes    Advanced directive: Yes    Advanced directive counseling given: Yes      Cognitive Screening:   Provider or family/friend/caregiver concerned regarding cognition?: No    PREVENTIVE SCREENINGS      Cardiovascular Screening:    General: Screening Not Indicated and History Lipid Disorder      Diabetes Screening:     General: Screening Not Indicated and History Diabetes      Colorectal Cancer Screening:     General: Screening Current      Breast Cancer Screening:     General: Screening Current      Cervical Cancer Screening:    General: Screening Not Indicated      Osteoporosis Screening:    General: Screening Not Indicated and History Osteoporosis      Abdominal Aortic Aneurysm (AAA) Screening:        General: Screening Not Indicated      Lung Cancer Screening:     General: Screening Not Indicated      Hepatitis C Screening:    General: Screening Current    Screening, Brief Intervention, and Referral to Treatment (SBIRT)    Screening  Typical number of drinks in a day: 0  Typical number of drinks in a week: 0  Interpretation: Low risk drinking behavior.    AUDIT-C Screenin) How often did you have a drink containing alcohol in the past year? never  2) How many drinks did you have on a typical day when you were drinking in the past year? 0  3) How often did you have 6 or more drinks on one occasion in the past year? never    AUDIT-C Score: 0  Interpretation: Score 0-2 (female): Negative screen for alcohol misuse    Single Item Drug Screening:  How often have you used an illegal drug (including marijuana) or a prescription  "medication for non-medical reasons in the past year? never    Single Item Drug Screen Score: 0  Interpretation: Negative screen for possible drug use disorder    Brief Intervention  Alcohol & drug use screenings were reviewed. No concerns regarding substance use disorder identified.     Social Determinants of Health     Financial Resource Strain: Low Risk  (8/20/2023)    Overall Financial Resource Strain (CARDIA)     Difficulty of Paying Living Expenses: Not hard at all   Food Insecurity: No Food Insecurity (10/25/2024)    Hunger Vital Sign     Worried About Running Out of Food in the Last Year: Never true     Ran Out of Food in the Last Year: Never true   Transportation Needs: No Transportation Needs (10/25/2024)    PRAPARE - Transportation     Lack of Transportation (Medical): No     Lack of Transportation (Non-Medical): No   Housing Stability: Low Risk  (10/25/2024)    Housing Stability Vital Sign     Unable to Pay for Housing in the Last Year: No     Number of Times Moved in the Last Year: 0     Homeless in the Last Year: No   Utilities: Not At Risk (10/25/2024)    [facility] Utilities     Threatened with loss of utilities: No     No results found.    Objective     /76 (BP Location: Left arm, Patient Position: Sitting, Cuff Size: Standard)   Pulse 72   Temp 98.7 °F (37.1 °C) (Tympanic)   Resp 18   Ht 5' 7.25\" (1.708 m)   Wt 122 kg (269 lb 6.4 oz)   SpO2 98%   BMI 41.88 kg/m²     Physical Exam  Vitals and nursing note reviewed.   Constitutional:       Appearance: She is well-developed.   Cardiovascular:      Rate and Rhythm: Normal rate and regular rhythm.   Pulmonary:      Effort: Pulmonary effort is normal.      Breath sounds: Normal breath sounds.   Abdominal:      General: Abdomen is flat.      Tenderness: There is no abdominal tenderness.   Musculoskeletal:      Right lower leg: No edema.      Left lower leg: No edema.   Neurological:      Mental Status: She is alert and oriented to person, place, and " time.   Psychiatric:         Behavior: Behavior normal.         Thought Content: Thought content normal.         Judgment: Judgment normal.

## 2024-11-01 NOTE — PATIENT INSTRUCTIONS
Medicare Preventive Visit Patient Instructions  Thank you for completing your Welcome to Medicare Visit or Medicare Annual Wellness Visit today. Your next wellness visit will be due in one year (11/2/2025).  The screening/preventive services that you may require over the next 5-10 years are detailed below. Some tests may not apply to you based off risk factors and/or age. Screening tests ordered at today's visit but not completed yet may show as past due. Also, please note that scanned in results may not display below.  Preventive Screenings:  Service Recommendations Previous Testing/Comments   Colorectal Cancer Screening  * Colonoscopy    * Fecal Occult Blood Test (FOBT)/Fecal Immunochemical Test (FIT)  * Fecal DNA/Cologuard Test  * Flexible Sigmoidoscopy Age: 45-75 years old   Colonoscopy: every 10 years (may be performed more frequently if at higher risk)  OR  FOBT/FIT: every 1 year  OR  Cologuard: every 3 years  OR  Sigmoidoscopy: every 5 years  Screening may be recommended earlier than age 45 if at higher risk for colorectal cancer. Also, an individualized decision between you and your healthcare provider will decide whether screening between the ages of 76-85 would be appropriate. Colonoscopy: 09/20/2022  FOBT/FIT: Not on file  Cologuard: Not on file  Sigmoidoscopy: Not on file    Screening Current     Breast Cancer Screening Age: 40+ years old  Frequency: every 1-2 years  Not required if history of left and right mastectomy Mammogram: 11/10/2023    Screening Current   Cervical Cancer Screening Between the ages of 21-29, pap smear recommended once every 3 years.   Between the ages of 30-65, can perform pap smear with HPV co-testing every 5 years.   Recommendations may differ for women with a history of total hysterectomy, cervical cancer, or abnormal pap smears in past. Pap Smear: 08/31/2018    Screening Not Indicated   Hepatitis C Screening Once for adults born between 1945 and 1965  More frequently in  patients at high risk for Hepatitis C Hep C Antibody: 05/02/2019    Screening Current   Diabetes Screening 1-2 times per year if you're at risk for diabetes or have pre-diabetes Fasting glucose: 155 mg/dL (10/21/2024)  A1C: 6.7 % (10/21/2024)  Screening Not Indicated  History Diabetes   Cholesterol Screening Once every 5 years if you don't have a lipid disorder. May order more often based on risk factors. Lipid panel: 10/21/2024    Screening Not Indicated  History Lipid Disorder     Other Preventive Screenings Covered by Medicare:  Abdominal Aortic Aneurysm (AAA) Screening: covered once if your at risk. You're considered to be at risk if you have a family history of AAA.  Lung Cancer Screening: covers low dose CT scan once per year if you meet all of the following conditions: (1) Age 55-77; (2) No signs or symptoms of lung cancer; (3) Current smoker or have quit smoking within the last 15 years; (4) You have a tobacco smoking history of at least 20 pack years (packs per day multiplied by number of years you smoked); (5) You get a written order from a healthcare provider.  Glaucoma Screening: covered annually if you're considered high risk: (1) You have diabetes OR (2) Family history of glaucoma OR (3)  aged 50 and older OR (4)  American aged 65 and older  Osteoporosis Screening: covered every 2 years if you meet one of the following conditions: (1) You're estrogen deficient and at risk for osteoporosis based off medical history and other findings; (2) Have a vertebral abnormality; (3) On glucocorticoid therapy for more than 3 months; (4) Have primary hyperparathyroidism; (5) On osteoporosis medications and need to assess response to drug therapy.   Last bone density test (DXA Scan): 10/07/2024.  HIV Screening: covered annually if you're between the age of 15-65. Also covered annually if you are younger than 15 and older than 65 with risk factors for HIV infection. For pregnant patients, it is  covered up to 3 times per pregnancy.    Immunizations:  Immunization Recommendations   Influenza Vaccine Annual influenza vaccination during flu season is recommended for all persons aged >= 6 months who do not have contraindications   Pneumococcal Vaccine   * Pneumococcal conjugate vaccine = PCV13 (Prevnar 13), PCV15 (Vaxneuvance), PCV20 (Prevnar 20)  * Pneumococcal polysaccharide vaccine = PPSV23 (Pneumovax) Adults 19-65 yo with certain risk factors or if 65+ yo  If never received any pneumonia vaccine: recommend Prevnar 20 (PCV20)  Give PCV20 if previously received 1 dose of PCV13 or PPSV23   Hepatitis B Vaccine 3 dose series if at intermediate or high risk (ex: diabetes, end stage renal disease, liver disease)   Respiratory syncytial virus (RSV) Vaccine - COVERED BY MEDICARE PART D  * RSVPreF3 (Arexvy) CDC recommends that adults 60 years of age and older may receive a single dose of RSV vaccine using shared clinical decision-making (SCDM)   Tetanus (Td) Vaccine - COST NOT COVERED BY MEDICARE PART B Following completion of primary series, a booster dose should be given every 10 years to maintain immunity against tetanus. Td may also be given as tetanus wound prophylaxis.   Tdap Vaccine - COST NOT COVERED BY MEDICARE PART B Recommended at least once for all adults. For pregnant patients, recommended with each pregnancy.   Shingles Vaccine (Shingrix) - COST NOT COVERED BY MEDICARE PART B  2 shot series recommended in those 19 years and older who have or will have weakened immune systems or those 50 years and older     Health Maintenance Due:      Topic Date Due   • Breast Cancer Screening: Mammogram  11/10/2024   • Colorectal Cancer Screening  09/19/2027   • Hepatitis C Screening  Completed   • Cervical Cancer Screening  Discontinued     Immunizations Due:  There are no preventive care reminders to display for this patient.  Advance Directives   What are advance directives?  Advance directives are legal documents  that state your wishes and plans for medical care. These plans are made ahead of time in case you lose your ability to make decisions for yourself. Advance directives can apply to any medical decision, such as the treatments you want, and if you want to donate organs.   What are the types of advance directives?  There are many types of advance directives, and each state has rules about how to use them. You may choose a combination of any of the following:  Living will:  This is a written record of the treatment you want. You can also choose which treatments you do not want, which to limit, and which to stop at a certain time. This includes surgery, medicine, IV fluid, and tube feedings.   Durable power of  for healthcare (DPAHC):  This is a written record that states who you want to make healthcare choices for you when you are unable to make them for yourself. This person, called a proxy, is usually a family member or a friend. You may choose more than 1 proxy.  Do not resuscitate (DNR) order:  A DNR order is used in case your heart stops beating or you stop breathing. It is a request not to have certain forms of treatment, such as CPR. A DNR order may be included in other types of advance directives.  Medical directive:  This covers the care that you want if you are in a coma, near death, or unable to make decisions for yourself. You can list the treatments you want for each condition. Treatment may include pain medicine, surgery, blood transfusions, dialysis, IV or tube feedings, and a ventilator (breathing machine).  Values history:  This document has questions about your views, beliefs, and how you feel and think about life. This information can help others choose the care that you would choose.  Why are advance directives important?  An advance directive helps you control your care. Although spoken wishes may be used, it is better to have your wishes written down. Spoken wishes can be misunderstood, or  not followed. Treatments may be given even if you do not want them. An advance directive may make it easier for your family to make difficult choices about your care.   Weight Management   Why it is important to manage your weight:  Being overweight increases your risk of health conditions such as heart disease, high blood pressure, type 2 diabetes, and certain types of cancer. It can also increase your risk for osteoarthritis, sleep apnea, and other respiratory problems. Aim for a slow, steady weight loss. Even a small amount of weight loss can lower your risk of health problems.  How to lose weight safely:  A safe and healthy way to lose weight is to eat fewer calories and get regular exercise. You can lose up about 1 pound a week by decreasing the number of calories you eat by 500 calories each day.   Healthy meal plan for weight management:  A healthy meal plan includes a variety of foods, contains fewer calories, and helps you stay healthy. A healthy meal plan includes the following:  Eat whole-grain foods more often.  A healthy meal plan should contain fiber. Fiber is the part of grains, fruits, and vegetables that is not broken down by your body. Whole-grain foods are healthy and provide extra fiber in your diet. Some examples of whole-grain foods are whole-wheat breads and pastas, oatmeal, brown rice, and bulgur.  Eat a variety of vegetables every day.  Include dark, leafy greens such as spinach, kale, piero greens, and mustard greens. Eat yellow and orange vegetables such as carrots, sweet potatoes, and winter squash.   Eat a variety of fruits every day.  Choose fresh or canned fruit (canned in its own juice or light syrup) instead of juice. Fruit juice has very little or no fiber.  Eat low-fat dairy foods.  Drink fat-free (skim) milk or 1% milk. Eat fat-free yogurt and low-fat cottage cheese. Try low-fat cheeses such as mozzarella and other reduced-fat cheeses.  Choose meat and other protein foods that  are low in fat.  Choose beans or other legumes such as split peas or lentils. Choose fish, skinless poultry (chicken or turkey), or lean cuts of red meat (beef or pork). Before you cook meat or poultry, cut off any visible fat.   Use less fat and oil.  Try baking foods instead of frying them. Add less fat, such as margarine, sour cream, regular salad dressing and mayonnaise to foods. Eat fewer high-fat foods. Some examples of high-fat foods include french fries, doughnuts, ice cream, and cakes.  Eat fewer sweets.  Limit foods and drinks that are high in sugar. This includes candy, cookies, regular soda, and sweetened drinks.  Exercise:  Exercise at least 30 minutes per day on most days of the week. Some examples of exercise include walking, biking, dancing, and swimming. You can also fit in more physical activity by taking the stairs instead of the elevator or parking farther away from stores. Ask your healthcare provider about the best exercise plan for you.      © Copyright [employer] 2018 Information is for End User's use only and may not be sold, redistributed or otherwise used for commercial purposes. All illustrations and images included in CareNotes® are the copyrighted property of [employer].A[employer] Inc. or [employer]

## 2024-12-09 DIAGNOSIS — I10 BENIGN ESSENTIAL HYPERTENSION: ICD-10-CM

## 2024-12-10 ENCOUNTER — NURSE TRIAGE (OUTPATIENT)
Age: 69
End: 2024-12-10

## 2024-12-10 RX ORDER — ATENOLOL 50 MG/1
100 TABLET ORAL
Qty: 180 TABLET | Refills: 1 | Status: SHIPPED | OUTPATIENT
Start: 2024-12-10

## 2024-12-10 NOTE — TELEPHONE ENCOUNTER
"Sahara reports cough and congestion for about one week. She states she occasionally has mild wheezing. She denies shortness of breath, chest pain, fever, or other symptoms. She states she is starting to feel improved, would like to be seen by PCP due to intermittent wheezing.    Sahara requests office visit for tomorrow, scheduled. Advised UC/ED with worsening symptoms in the meantime.      Reason for Disposition   Wheezing is present    Answer Assessment - Initial Assessment Questions  1. ONSET: \"When did the cough begin?\"       About one week ago    2. SEVERITY: \"How bad is the cough today?\"       Mild to moderate    3. SPUTUM: \"Describe the color of your sputum\" (e.g., none, dry cough; clear, white, yellow, green)      none    4. HEMOPTYSIS: \"Are you coughing up any blood?\" If Yes, ask: \"How much?\" (e.g., flecks, streaks, tablespoons, etc.)      Denies    5. DIFFICULTY BREATHING: \"Are you having difficulty breathing?\" If Yes, ask: \"How bad is it?\" (e.g., mild, moderate, severe)       Denies, occasional wheezing    6. FEVER: \"Do you have a fever?\" If Yes, ask: \"What is your temperature, how was it measured, and when did it start?\"      Denies    7. CARDIAC HISTORY: \"Do you have any history of heart disease?\" (e.g., heart attack, congestive heart failure)       Denies    8. LUNG HISTORY: \"Do you have any history of lung disease?\"  (e.g., pulmonary embolus, asthma, emphysema)      Denies    9. OTHER SYMPTOMS: \"Do you have any other symptoms?\" (e.g., runny nose, wheezing, chest pain)        Occasional wheezing    Protocols used: Cough-Adult-[place]    "

## 2024-12-11 ENCOUNTER — OFFICE VISIT (OUTPATIENT)
Dept: INTERNAL MEDICINE CLINIC | Facility: CLINIC | Age: 69
End: 2024-12-11
Payer: MEDICARE

## 2024-12-11 VITALS
DIASTOLIC BLOOD PRESSURE: 86 MMHG | HEIGHT: 69 IN | HEART RATE: 64 BPM | SYSTOLIC BLOOD PRESSURE: 142 MMHG | OXYGEN SATURATION: 93 % | BODY MASS INDEX: 39.78 KG/M2 | TEMPERATURE: 98.9 F

## 2024-12-11 DIAGNOSIS — J20.9 ACUTE BRONCHITIS WITH BRONCHOSPASM: Primary | ICD-10-CM

## 2024-12-11 PROCEDURE — 99213 OFFICE O/P EST LOW 20 MIN: CPT | Performed by: PHYSICIAN ASSISTANT

## 2024-12-11 PROCEDURE — G2211 COMPLEX E/M VISIT ADD ON: HCPCS | Performed by: PHYSICIAN ASSISTANT

## 2024-12-11 RX ORDER — METHYLPREDNISOLONE 4 MG/1
TABLET ORAL
Qty: 21 TABLET | Refills: 0 | Status: SHIPPED | OUTPATIENT
Start: 2024-12-11

## 2024-12-11 RX ORDER — AZITHROMYCIN 250 MG/1
TABLET, FILM COATED ORAL
Qty: 6 TABLET | Refills: 0 | Status: SHIPPED | OUTPATIENT
Start: 2024-12-11 | End: 2024-12-15

## 2024-12-11 NOTE — PROGRESS NOTES
Name: Ruby Odonnell      : 1955      MRN: [id number]  Encounter Provider: Myron Marie PA-C  Encounter Date: 2024   Encounter department: [facility] INTERNAL MEDICINE [place]  :  Assessment & Plan  Acute bronchitis with bronchospasm    Orders:    azithromycin (ZITHROMAX) 250 mg tablet; Take 2 tablets today then 1 tablet daily x 4 days    methylPREDNISolone 4 MG tablet therapy pack; Use as directed on package           History of Present Illness     Acute visit    Patient had just returned from visiting her daughter and grandchildren.  States her grandchildren were sick with cough and colds.  Within 2 days of returning home she started with coughing and wheezing.  This has been continuing.  No fever.  No upper respiratory symptoms of head congestion, postnasal drip, earaches.  Cough is generally tight and nonproductive.  She does hear wheezing.      Review of Systems   Constitutional:  Negative for activity change, chills, fatigue and fever.   HENT:  Positive for congestion. Negative for ear pain, postnasal drip, rhinorrhea, sinus pressure, sinus pain, sneezing and sore throat.    Eyes:  Negative for discharge.   Respiratory:  Positive for cough, chest tightness and wheezing. Negative for shortness of breath.    Cardiovascular:  Negative for chest pain, palpitations and leg swelling.   Gastrointestinal:  Negative for abdominal pain, blood in stool, constipation, diarrhea, nausea and vomiting.   Genitourinary:  Negative for difficulty urinating.   Musculoskeletal:  Negative for arthralgias and myalgias.   Skin:  Negative for rash.   Allergic/Immunologic: Negative for immunocompromised state.   Neurological:  Negative for dizziness, syncope, weakness, light-headedness and headaches.   Hematological:  Negative for adenopathy. Does not bruise/bleed easily.   Psychiatric/Behavioral:  Negative for dysphoric mood. The patient is not nervous/anxious.        Objective   /86 (BP Location: Left  "arm, Patient Position: Sitting, Cuff Size: Large)   Pulse 64   Temp 98.9 °F (37.2 °C) (Tympanic)   Ht 5' 9\" (1.753 m)   SpO2 93%   BMI 39.78 kg/m²      Physical Exam  Vitals and nursing note reviewed.   Constitutional:       Appearance: She is well-developed. She is not ill-appearing.   HENT:      Head: Normocephalic and atraumatic.      Right Ear: Tympanic membrane, ear canal and external ear normal.      Left Ear: Tympanic membrane, ear canal and external ear normal.      Nose: Nose normal.      Mouth/Throat:      Mouth: Mucous membranes are moist.      Pharynx: Oropharynx is clear. No oropharyngeal exudate or posterior oropharyngeal erythema.   Eyes:      Extraocular Movements: Extraocular movements intact.      Conjunctiva/sclera: Conjunctivae normal.      Pupils: Pupils are equal, round, and reactive to light.   Neck:      Thyroid: No thyromegaly.   Cardiovascular:      Rate and Rhythm: Normal rate and regular rhythm.      Heart sounds: Normal heart sounds. No murmur heard.  Pulmonary:      Effort: Pulmonary effort is normal.      Comments: Coarse breath sounds in the laryngotracheal distribution.  Forced expiratory wheezing present in the anterior fields.  Abdominal:      General: Bowel sounds are normal.      Palpations: Abdomen is soft. There is no mass.      Tenderness: There is no abdominal tenderness.   Musculoskeletal:         General: Normal range of motion.      Cervical back: Normal range of motion and neck supple.   Lymphadenopathy:      Cervical: No cervical adenopathy.   Skin:     General: Skin is warm and dry.      Findings: No rash.   Neurological:      Mental Status: She is alert and oriented to person, place, and time.      Deep Tendon Reflexes: Reflexes normal.   Psychiatric:         Behavior: Behavior normal.         Thought Content: Thought content normal.         Judgment: Judgment normal.         "

## 2024-12-11 NOTE — TELEPHONE ENCOUNTER
Anesthesia Post-op Note    Patient: Meenu Ochoa  Procedure(s) Performed: FESS, USING COMPUTER-ASSISTED NAVIGATION, MICROLARYNGOSCOPY WITH BRONCHOSCOPY AND CILIARY BIOPSY  . (Nose)  Anesthesia type: General    Vitals Value Taken Time   Temp 36.1 12/10/24 1842   Pulse 88 12/10/24 1842   Resp 20 12/10/24 1842   SpO2 93 % 12/10/24 1842   /65 12/10/24 1837   Vitals shown include unfiled device data.      Patient Location: PACU Phase 1  Post-op Vital Signs:stable  Level of Consciousness: awake and alert  Respiratory Status: spontaneous ventilation  Cardiovascular stable  Hydration: euvolemic  Pain Management: adequately controlled  Handoff: Handoff to receiving clinician was performed and questions were answered  Vomiting: none  Nausea: None  Airway Patency:patent  Post-op Assessment: no complications and patient tolerated procedure well      There were no known notable events for this encounter.                       Upon review of the In Basket request we have found this is a duplicate request and no further action is needed. This request was completed upon initial request, the patient chart is up to date, and this message will now be closed.    Any additional questions or concerns should be emailed to the Practice Liaisons via the appropriate education email address, please do not reply via In Basket.    Thank you  Timoteo Rodriguez MA

## 2024-12-11 NOTE — PATIENT INSTRUCTIONS
Rest, increase fluids.  Tylenol for fever or aches as per package instructions.  Start antibiotic and take as directed.  Take with food.  Always wise to take a probiotic and or eat yogurt daily when on an antibiotic.  Start Medrol pack and also take with food, take as directed.  Keep us informed as to progress or not.

## 2025-01-31 DIAGNOSIS — E11.9 TYPE 2 DIABETES MELLITUS WITHOUT COMPLICATION, WITHOUT LONG-TERM CURRENT USE OF INSULIN (HCC): ICD-10-CM

## 2025-01-31 RX ORDER — METFORMIN HYDROCHLORIDE 500 MG/1
500 TABLET, EXTENDED RELEASE ORAL
Qty: 90 TABLET | Refills: 1 | Status: SHIPPED | OUTPATIENT
Start: 2025-01-31

## 2025-02-24 ENCOUNTER — APPOINTMENT (OUTPATIENT)
Dept: LAB | Facility: CLINIC | Age: 70
End: 2025-02-24
Payer: MEDICARE

## 2025-02-24 DIAGNOSIS — E11.9 TYPE 2 DIABETES MELLITUS WITHOUT COMPLICATION, WITHOUT LONG-TERM CURRENT USE OF INSULIN (HCC): ICD-10-CM

## 2025-02-24 LAB
ALBUMIN SERPL BCG-MCNC: 4.4 G/DL (ref 3.5–5)
ALP SERPL-CCNC: 57 U/L (ref 34–104)
ALT SERPL W P-5'-P-CCNC: 20 U/L (ref 7–52)
ANION GAP SERPL CALCULATED.3IONS-SCNC: 11 MMOL/L (ref 4–13)
AST SERPL W P-5'-P-CCNC: 21 U/L (ref 13–39)
BASOPHILS # BLD AUTO: 0.03 THOUSANDS/ÂΜL (ref 0–0.1)
BASOPHILS NFR BLD AUTO: 0 % (ref 0–1)
BILIRUB SERPL-MCNC: 1.88 MG/DL (ref 0.2–1)
BUN SERPL-MCNC: 19 MG/DL (ref 5–25)
CALCIUM SERPL-MCNC: 9.7 MG/DL (ref 8.4–10.2)
CHLORIDE SERPL-SCNC: 99 MMOL/L (ref 96–108)
CHOLEST SERPL-MCNC: 144 MG/DL (ref ?–200)
CO2 SERPL-SCNC: 29 MMOL/L (ref 21–32)
CREAT SERPL-MCNC: 0.62 MG/DL (ref 0.6–1.3)
EOSINOPHIL # BLD AUTO: 0.13 THOUSAND/ÂΜL (ref 0–0.61)
EOSINOPHIL NFR BLD AUTO: 2 % (ref 0–6)
ERYTHROCYTE [DISTWIDTH] IN BLOOD BY AUTOMATED COUNT: 13.6 % (ref 11.6–15.1)
EST. AVERAGE GLUCOSE BLD GHB EST-MCNC: 169 MG/DL
GFR SERPL CREATININE-BSD FRML MDRD: 91 ML/MIN/1.73SQ M
GLUCOSE P FAST SERPL-MCNC: 183 MG/DL (ref 65–99)
HBA1C MFR BLD: 7.5 %
HCT VFR BLD AUTO: 42.4 % (ref 34.8–46.1)
HDLC SERPL-MCNC: 38 MG/DL
HGB BLD-MCNC: 14.2 G/DL (ref 11.5–15.4)
IMM GRANULOCYTES # BLD AUTO: 0.03 THOUSAND/UL (ref 0–0.2)
IMM GRANULOCYTES NFR BLD AUTO: 0 % (ref 0–2)
LDLC SERPL CALC-MCNC: 45 MG/DL (ref 0–100)
LYMPHOCYTES # BLD AUTO: 2.13 THOUSANDS/ÂΜL (ref 0.6–4.47)
LYMPHOCYTES NFR BLD AUTO: 31 % (ref 14–44)
MCH RBC QN AUTO: 29.6 PG (ref 26.8–34.3)
MCHC RBC AUTO-ENTMCNC: 33.5 G/DL (ref 31.4–37.4)
MCV RBC AUTO: 89 FL (ref 82–98)
MONOCYTES # BLD AUTO: 0.5 THOUSAND/ÂΜL (ref 0.17–1.22)
MONOCYTES NFR BLD AUTO: 7 % (ref 4–12)
NEUTROPHILS # BLD AUTO: 4.06 THOUSANDS/ÂΜL (ref 1.85–7.62)
NEUTS SEG NFR BLD AUTO: 60 % (ref 43–75)
NRBC BLD AUTO-RTO: 0 /100 WBCS
PLATELET # BLD AUTO: 200 THOUSANDS/UL (ref 149–390)
PMV BLD AUTO: 12 FL (ref 8.9–12.7)
POTASSIUM SERPL-SCNC: 3.9 MMOL/L (ref 3.5–5.3)
PROT SERPL-MCNC: 7.1 G/DL (ref 6.4–8.4)
RBC # BLD AUTO: 4.79 MILLION/UL (ref 3.81–5.12)
SODIUM SERPL-SCNC: 139 MMOL/L (ref 135–147)
TRIGL SERPL-MCNC: 307 MG/DL (ref ?–150)
WBC # BLD AUTO: 6.88 THOUSAND/UL (ref 4.31–10.16)

## 2025-02-24 PROCEDURE — 85025 COMPLETE CBC W/AUTO DIFF WBC: CPT

## 2025-02-24 PROCEDURE — 80053 COMPREHEN METABOLIC PANEL: CPT

## 2025-02-24 PROCEDURE — 83036 HEMOGLOBIN GLYCOSYLATED A1C: CPT

## 2025-02-24 PROCEDURE — 36415 COLL VENOUS BLD VENIPUNCTURE: CPT

## 2025-02-24 PROCEDURE — 80061 LIPID PANEL: CPT

## 2025-03-06 ENCOUNTER — OFFICE VISIT (OUTPATIENT)
Dept: INTERNAL MEDICINE CLINIC | Facility: CLINIC | Age: 70
End: 2025-03-06
Payer: MEDICARE

## 2025-03-06 VITALS
HEART RATE: 96 BPM | DIASTOLIC BLOOD PRESSURE: 84 MMHG | HEIGHT: 69 IN | WEIGHT: 266 LBS | SYSTOLIC BLOOD PRESSURE: 126 MMHG | TEMPERATURE: 97.8 F | BODY MASS INDEX: 39.4 KG/M2 | OXYGEN SATURATION: 97 %

## 2025-03-06 DIAGNOSIS — E11.9 TYPE 2 DIABETES MELLITUS WITHOUT COMPLICATION, WITHOUT LONG-TERM CURRENT USE OF INSULIN (HCC): Primary | ICD-10-CM

## 2025-03-06 DIAGNOSIS — E78.2 MIXED HYPERLIPIDEMIA: ICD-10-CM

## 2025-03-06 DIAGNOSIS — I10 BENIGN ESSENTIAL HYPERTENSION: ICD-10-CM

## 2025-03-06 DIAGNOSIS — E66.01 OBESITY, MORBID (HCC): ICD-10-CM

## 2025-03-06 PROCEDURE — G2211 COMPLEX E/M VISIT ADD ON: HCPCS | Performed by: INTERNAL MEDICINE

## 2025-03-06 PROCEDURE — 99214 OFFICE O/P EST MOD 30 MIN: CPT | Performed by: INTERNAL MEDICINE

## 2025-03-06 NOTE — ASSESSMENT & PLAN NOTE
Worsening control but possibly from the holidays and also steroid use.  She feels she can get her sugar back down.  We discussed possibly increasing the metformin.  Also discussed briefly considering GLP-1's.  She wants to think about that.  Lab Results   Component Value Date    HGBA1C 7.5 (H) 02/24/2025       Orders:  •  Albumin / creatinine urine ratio; Future  •  Comprehensive metabolic panel; Future  •  Hemoglobin A1C; Future  •  CBC and differential; Future  •  Lipid Panel with Direct LDL reflex; Future

## 2025-03-06 NOTE — ASSESSMENT & PLAN NOTE
Again, we discussed GLP-1's for the diabetes but that would also obviously benefit her weight and probably her orthopedic issues.

## 2025-03-06 NOTE — PROGRESS NOTES
Name: Ruby Odonnell      : 1955      MRN: [id number]  Encounter Provider: Anil Ramos MD  Encounter Date: 3/6/2025   Encounter department: [facility] [place]  :  Assessment & Plan  Type 2 diabetes mellitus without complication, without long-term current use of insulin (HCC)  Worsening control but possibly from the holidays and also steroid use.  She feels she can get her sugar back down.  We discussed possibly increasing the metformin.  Also discussed briefly considering GLP-1's.  She wants to think about that.  Lab Results   Component Value Date    HGBA1C 7.5 (H) 2025       Orders:  •  Albumin / creatinine urine ratio; Future  •  Comprehensive metabolic panel; Future  •  Hemoglobin A1C; Future  •  CBC and differential; Future  •  Lipid Panel with Direct LDL reflex; Future    Benign essential hypertension  Controlled.  No change in medication.       Mixed hyperlipidemia  Controlled.       Obesity, morbid (HCC)    Again, we discussed GLP-1's for the diabetes but that would also obviously benefit her weight and probably her orthopedic issues.              History of Present Illness   Patient presents today for routine follow-up.  She states she is doing okay.  Her sugars were up however.  She does get steroid injections in her knees.  But she also was on steroids for a bad bronchitis and it also included the holidays.  She is taking her medicine as directed.  Watching her diet for the most part other than the holidays.  Her blood pressure is controlled.  Cholesterol is controlled.  Still seeing orthopedics for her knees.  No new complaints today.  No further additions to her history.      Review of Systems   Respiratory:  Negative for shortness of breath.    Cardiovascular:  Negative for chest pain.   Gastrointestinal:  Negative for abdominal pain.       Objective   /84 (BP Location: Left arm, Patient Position: Sitting, Cuff Size: Standard)   Pulse 96   Temp 97.8 °F  "(36.6 °C) (Temporal)   Ht 5' 9\" (1.753 m)   Wt 121 kg (266 lb)   SpO2 97%   BMI 39.28 kg/m²      Physical Exam  Vitals and nursing note reviewed.   Constitutional:       Appearance: Normal appearance. She is well-developed. She is obese.   Cardiovascular:      Rate and Rhythm: Normal rate and regular rhythm.      Heart sounds: Normal heart sounds.   Pulmonary:      Effort: Pulmonary effort is normal.      Breath sounds: Normal breath sounds.   Abdominal:      Palpations: Abdomen is soft.      Tenderness: There is no abdominal tenderness.   Neurological:      Mental Status: She is alert and oriented to person, place, and time.         "

## 2025-03-07 ENCOUNTER — EVALUATION (OUTPATIENT)
Dept: PHYSICAL THERAPY | Facility: CLINIC | Age: 70
End: 2025-03-07
Payer: MEDICARE

## 2025-03-07 DIAGNOSIS — M17.0 BILATERAL PRIMARY OSTEOARTHRITIS OF KNEE: Primary | ICD-10-CM

## 2025-03-07 PROCEDURE — 97110 THERAPEUTIC EXERCISES: CPT | Performed by: PHYSICAL THERAPIST

## 2025-03-07 PROCEDURE — 97161 PT EVAL LOW COMPLEX 20 MIN: CPT | Performed by: PHYSICAL THERAPIST

## 2025-03-07 NOTE — LETTER
2025    Karla Lopez MD  [address] [facility] [address]    Patient: Ruby Odonnell   YOB: 1955   Date of Visit: 3/7/2025     Encounter Diagnosis     ICD-10-CM    1. Bilateral primary osteoarthritis of knee  M17.0           Dear Dr. Lopez:    Thank you for your recent referral of Ruby Odonnell. Please review the attached evaluation summary from Ruby's recent visit.     Please verify that you agree with the plan of care by signing the attached order.     If you have any questions or concerns, please do not hesitate to call.     I sincerely appreciate the opportunity to share in the care of one of your patients and hope to have another opportunity to work with you in the near future.       Sincerely,    Ricky Lopes, PT      Referring Provider:      I certify that I have read the below Plan of Care and certify the need for these services furnished under this plan of treatment while under my care.                    Karla Lopez MD  [address] [facility] [address]  Via Fax: [phone]          PT Evaluation     Today's date: 3/7/2025  Patient name: Ruby Odonnell  : 1955  MRN: [id number]  Referring provider: Karla Lopez MD  Dx:   Encounter Diagnosis     ICD-10-CM    1. Bilateral primary osteoarthritis of knee  M17.0                          Assessment  Impairments: abnormal gait, abnormal or restricted ROM, activity intolerance, impaired physical strength and pain with function  Symptom irritability: low    Assessment details: Pt is a 69 y/o female who presents to physical therapy with primary nociceptive pain associated with b/l knee OA in the environment of reduced hip strength, visual changes, reduced knee strength complicated by fear of falling. Pt does not present with any red flag symptoms at this time. Dynamic gait index assessed today and pt falls within the fall risk category. She also demonstrates reduced LE  strength. Education provided regarding POC, prognosis and HEP, pt verablized understanding. Pt would benefit from skilled physical therapy in order to decrease deficits and return to prior level of function.    Understanding of Dx/Px/POC: good    Goals  STG (4 weeks):  Pt will be independent with HEP.  LMA movement >5.  DGI >17.    LTG (8 weeks):  FOTO will be expected outcome.   LMA movement >7.  DGI >19.      Plan  Patient would benefit from: skilled physical therapy  Planned modality interventions: cryotherapy    Planned therapy interventions: manual therapy, neuromuscular re-education, patient education, self care, strengthening, stretching, therapeutic activities, therapeutic exercise and home exercise program    Frequency: 1-2x/week.  Duration in weeks: 8  Treatment plan discussed with: patient    Subjective Evaluation    History of Present Illness  Mechanism of injury: (2/6/24): Pt reports that she began having b/l knee pain 5 years ago. She states that it continued to increase until 2 years ago until she went to get it looked at. Sports med MD administered CSI 1x into each knee and within 20 mins she had full relief of symptoms. She states that 7 weeks later she got gel injections, lasted 3-6 months and then CSI and gel, lasted about 6 months. Most recent gel injection 3.5 weeks ago. Each time the pain comes back, pt reports her symptoms are of gradual onset. Pt was seen through the beginning of December at which point her function was improved. She states that she also received CSI injections into both knees in mid December that largely reduced her pain. She states that since then she has been sporadic with HEP, but still continues to complete it. She states that usually CSI's begin to wear off around the 6 week quin so this is coming up for her now. She would like to return to the gym and plans on using the treadmill.     (3/7/25): Pt reports that since the last visit, she has stopped going to the gym and  doing exercise at home. She reports that she will do walking at local stores most days though. She reports she continues to get injections every three months with good improvement with CSI (although her pain never gets that bad). She states that her MD recommended she come back to PT to begin to do more exercise.    She also reports that she is following her nutrition plan closely and still having trouble losing weight. She was dx with DM since last visit. She states that she is hesitant to try GLP-1 medication due to the recency of the meds and the potential side effects. She states that she believes the weight has something to do with her knee pain but believes the best way to achieve this is through exercise.     Severity: low  Irritability: low  Nature:nociceptive  Stage: chronic  Stability: Stable    P1: see body chart    Occupation: Retired  Patient Goals  Patient goal: get on a regular exercise routine    Objective     Observations     Additional Observation Details  Reduced L knee ext in standing  Slight offloading of the L LE in quiet standing    Stairs up:  R LE some difficulty no pain  L LE some difficulty no pain    Stairs down:  R LE WNL  L LE no pain and lack of eccentric control    Gait: no pain but trendelenburg noted b/l    SLS: L LE no pain but slight trendelenburg on the L, increased sway on the L    Neurological Testing     Additional Neurological Details  Lumbar ROM screen- WNL and no recreation of symptoms with OP in straight plane motions    Vision: good ability to track with both eyes, peripheral vision WNL- reports of wavy vision but no areas of the vision that are blacked out    Balance:  DGI: 14       Active Range of Motion   Left Knee   Flexion: 130 degrees   Extension: 0 degrees     Right Knee   Flexion: 127 degrees   Extension: 0 degrees              Precautions: HTN    POC expires Unit limit Auth Expiration date PT/OT + Visit Limit?   5/2/25  No BOMN                                Manuals 3/7                                                                Neuro Re-Ed             Gait with head turns             Backward walking             hurdles                                                                 Ther Ex             Pt edu AUSTEN            bike             treadmill             Leg press                                                                 Ther Activity                                       Gait Training                                       Modalities

## 2025-03-07 NOTE — PROGRESS NOTES
PT Evaluation     Today's date: 3/7/2025  Patient name: Ruby Odonnell  : 1955  MRN: [id number]  Referring provider: Karla Lopez MD  Dx:   Encounter Diagnosis     ICD-10-CM    1. Bilateral primary osteoarthritis of knee  M17.0                          Assessment  Impairments: abnormal gait, abnormal or restricted ROM, activity intolerance, impaired physical strength and pain with function  Symptom irritability: low    Assessment details: Pt is a 69 y/o female who presents to physical therapy with primary nociceptive pain associated with b/l knee OA in the environment of reduced hip strength, visual changes, reduced knee strength complicated by fear of falling. Pt does not present with any red flag symptoms at this time. Dynamic gait index assessed today and pt falls within the fall risk category. She also demonstrates reduced LE strength. Education provided regarding POC, prognosis and HEP, pt verablized understanding. Pt would benefit from skilled physical therapy in order to decrease deficits and return to prior level of function.    Understanding of Dx/Px/POC: good    Goals  STG (4 weeks):  Pt will be independent with HEP.  LMA movement >5.  DGI >17.    LTG (8 weeks):  FOTO will be expected outcome.   LMA movement >7.  DGI >19.      Plan  Patient would benefit from: skilled physical therapy  Planned modality interventions: cryotherapy    Planned therapy interventions: manual therapy, neuromuscular re-education, patient education, self care, strengthening, stretching, therapeutic activities, therapeutic exercise and home exercise program    Frequency: 1-2x/week.  Duration in weeks: 8  Treatment plan discussed with: patient    Subjective Evaluation    History of Present Illness  Mechanism of injury: (24): Pt reports that she began having b/l knee pain 5 years ago. She states that it continued to increase until 2 years ago until she went to get it looked at. Sports med MD administered CSI 1x  into each knee and within 20 mins she had full relief of symptoms. She states that 7 weeks later she got gel injections, lasted 3-6 months and then CSI and gel, lasted about 6 months. Most recent gel injection 3.5 weeks ago. Each time the pain comes back, pt reports her symptoms are of gradual onset. Pt was seen through the beginning of December at which point her function was improved. She states that she also received CSI injections into both knees in mid December that largely reduced her pain. She states that since then she has been sporadic with HEP, but still continues to complete it. She states that usually CSI's begin to wear off around the 6 week quin so this is coming up for her now. She would like to return to the gym and plans on using the treadmill.     (3/7/25): Pt reports that since the last visit, she has stopped going to the gym and doing exercise at home. She reports that she will do walking at local stores most days though. She reports she continues to get injections every three months with good improvement with CSI (although her pain never gets that bad). She states that her MD recommended she come back to PT to begin to do more exercise.    She also reports that she is following her nutrition plan closely and still having trouble losing weight. She was dx with DM since last visit. She states that she is hesitant to try GLP-1 medication due to the recency of the meds and the potential side effects. She states that she believes the weight has something to do with her knee pain but believes the best way to achieve this is through exercise.     Severity: low  Irritability: low  Nature:nociceptive  Stage: chronic  Stability: Stable    P1: see body chart    Occupation: Retired  Patient Goals  Patient goal: get on a regular exercise routine    Objective     Observations     Additional Observation Details  Reduced L knee ext in standing  Slight offloading of the L LE in quiet standing    Stairs up:  R LE  some difficulty no pain  L LE some difficulty no pain    Stairs down:  R LE WNL  L LE no pain and lack of eccentric control    Gait: no pain but trendelenburg noted b/l    SLS: L LE no pain but slight trendelenburg on the L, increased sway on the L    Neurological Testing     Additional Neurological Details  Lumbar ROM screen- WNL and no recreation of symptoms with OP in straight plane motions    Vision: good ability to track with both eyes, peripheral vision WNL- reports of wavy vision but no areas of the vision that are blacked out    Balance:  DGI: 14       Active Range of Motion   Left Knee   Flexion: 130 degrees   Extension: 0 degrees     Right Knee   Flexion: 127 degrees   Extension: 0 degrees              Precautions: HTN    POC expires Unit limit Auth Expiration date PT/OT + Visit Limit?   5/2/25  No BOMN                               Manuals 3/7                                                                Neuro Re-Ed             Gait with head turns             Backward walking             hurdles                                                                 Ther Ex             Pt edu AUSTEN            bike             treadmill             Leg press                                                                 Ther Activity                                       Gait Training                                       Modalities

## 2025-03-10 ENCOUNTER — OFFICE VISIT (OUTPATIENT)
Dept: PHYSICAL THERAPY | Facility: CLINIC | Age: 70
End: 2025-03-10
Payer: MEDICARE

## 2025-03-10 DIAGNOSIS — M17.0 BILATERAL PRIMARY OSTEOARTHRITIS OF KNEE: Primary | ICD-10-CM

## 2025-03-10 PROCEDURE — 97112 NEUROMUSCULAR REEDUCATION: CPT | Performed by: PHYSICAL THERAPIST

## 2025-03-10 PROCEDURE — 97110 THERAPEUTIC EXERCISES: CPT | Performed by: PHYSICAL THERAPIST

## 2025-03-10 NOTE — PROGRESS NOTES
Daily Note     Today's date: 3/10/2025  Patient name: Ruby Odonnell  : 1955  MRN: [id number]  Referring provider: Karla Lopez MD  Dx:   Encounter Diagnosis     ICD-10-CM    1. Bilateral primary osteoarthritis of knee  M17.0                      Subjective: Pt offers no new complaints.       Objective: See treatment diary below    MCTSIB:   1,2,3 WNL  4: increased sway but no LOB    Tandem stance:  L:30s  R:10s    Romberg on foam head turns:  L: 5s significant sway where pt had to reduce her speed of head turns  R: 5s significant sway where pt had to reduce her speed of head turns    [place]:  4.56 b/l plantar aspect of the feet      Assessment: Tolerated treatment well. Loss of protective sensation noted b/l in plantar aspect of both feet. Static balance doing better than expected. From DGI, noted difficulty with vestibular challenge. Assessment confirmed. Initiated treatment for balance here. Also, discussed LMA, discussed focus on movement pillar. Discussion around goal to achieve this, pt reported 8/10 confidence of PT 2x/wk, seated exercise 2x/wk, and walking at 's 2x/wk. Patient would benefit from continued PT      Plan: Continue per plan of care.  Progress treatment as tolerated.       Precautions: HTN    POC expires Unit limit Auth Expiration date PT/OT + Visit Limit?   25  No BOMN                             Code: [email or web address]    Manuals 3/7 3/10                                                               Neuro Re-Ed             Gait with head turns             Backward walking             hurdles             Balance assessment  AUSTEN- see above           [place] Oneill  AUSTEN- see above                                     Ther Ex             Pt edu AUSTEN AUSTEN- movement goal           bike             treadmill             Leg press                                                                 Ther Activity                                       Gait Training                                        Modalities

## 2025-03-14 ENCOUNTER — OFFICE VISIT (OUTPATIENT)
Dept: PHYSICAL THERAPY | Facility: CLINIC | Age: 70
End: 2025-03-14
Payer: MEDICARE

## 2025-03-14 DIAGNOSIS — M17.0 BILATERAL PRIMARY OSTEOARTHRITIS OF KNEE: Primary | ICD-10-CM

## 2025-03-14 PROCEDURE — 97110 THERAPEUTIC EXERCISES: CPT | Performed by: PHYSICAL THERAPIST

## 2025-03-14 PROCEDURE — 97112 NEUROMUSCULAR REEDUCATION: CPT | Performed by: PHYSICAL THERAPIST

## 2025-03-14 NOTE — PROGRESS NOTES
Daily Note     Today's date: 3/14/2025  Patient name: Ruby Odonnell  : 1955  MRN: [id number]  Referring provider: Karla Lopez MD  Dx:   Encounter Diagnosis     ICD-10-CM    1. Bilateral primary osteoarthritis of knee  M17.0                      Subjective: Pt reports completing daily walk and doing [employer] on Wednesday, meeting our goal of movement.       Objective: See treatment diary below    5x STS: 13.02s 2/10 L knee anteromedial, unable to complete without airex    Sidelying hip abd MMT: 2+/5 b/l    Knee ROM WNL into PROM straight and combined      Assessment: Tolerated treatment well. Knee asterisks in regards to pain minimal. However, noted strength deficits as above. Initiated strengthening program. Added only 1 exercise at this time, will progress to gym routine at next visit to hit other muscle groups as pt reports that she would like to do this. Patient would benefit from continued PT      Plan: Continue per plan of care.  Progress treatment as tolerated.       Precautions: HTN    POC expires Unit limit Auth Expiration date PT/OT + Visit Limit?   25  No BOMN                             Code: [email or web address]    Manuals 3/7 3/10 3/14          Joint mobility   Assessment                                                 Neuro Re-Ed             Gait with head turns             Backward walking             hurdles             Balance assessment  AUSTEN- see above           [place] Nino  AUSTEN- see above           Tandem stance             EO Romberg on foam head turns   AUSTEN          Ther Ex             Pt edu AUSTEN AUSTEN- movement goal           bike             treadmill             Leg press             Supine hip abd   RTB 2x20          MMT   Assessment AUSTEN                                    Ther Activity             5x STS   AUSTEN                       Gait Training                                       Modalities

## 2025-03-17 ENCOUNTER — OFFICE VISIT (OUTPATIENT)
Dept: PHYSICAL THERAPY | Facility: CLINIC | Age: 70
End: 2025-03-17
Payer: MEDICARE

## 2025-03-17 DIAGNOSIS — M17.0 BILATERAL PRIMARY OSTEOARTHRITIS OF KNEE: Primary | ICD-10-CM

## 2025-03-17 PROCEDURE — 97112 NEUROMUSCULAR REEDUCATION: CPT | Performed by: PHYSICAL THERAPIST

## 2025-03-17 PROCEDURE — 97110 THERAPEUTIC EXERCISES: CPT | Performed by: PHYSICAL THERAPIST

## 2025-03-17 NOTE — PROGRESS NOTES
Daily Note     Today's date: 3/17/2025  Patient name: Ruby Odonnell  : 1955  MRN: [id number]  Referring provider: Karla Lopez MD  Dx:   Encounter Diagnosis     ICD-10-CM    1. Bilateral primary osteoarthritis of knee  M17.0                      Subjective: Pt reports she tried doing the hip abd exercise in standing, felt some tightness in the back. By the next day, it resolved and so she went back to laying down for the exercise as prescribed.       Objective: See treatment diary below      Assessment: Tolerated treatment well. Confidence level assessed for return to gym 10/10 for 2x/week. Discussed strength program, decided on 1x lower body and 1x upper body per week, machine based. Printed out routine for patient. Patient would benefit from continued PT      Plan: Continue per plan of care.  Progress treatment as tolerated.       Precautions: HTN    POC expires Unit limit Auth Expiration date PT/OT + Visit Limit?   25  No BOMN                             Code: AFKBEZHH Home; [id number] Gym    Manuals 3/7 3/10 3/14 3/17         Joint mobility   Assessment                                                 Neuro Re-Ed             Gait with head turns    8'         Backward walking             hurdles             Balance assessment  AUSTEN- see above           [place] Nino  AUSTEN- see above                        Tandem stance    5'         EO Romberg on foam head turns   AUSTEN 10'         Ther Ex             Pt edu AUSTEN AUSTEN- movement goal  AUSTEN- gym routine         bike    5'         treadmill             Leg press    65# 2x10 plate at 9         Supine hip abd   RTB 2x20          MMT   Assessment AUSTEN                                    Ther Activity             5x STS   AUSTEN                       Gait Training                                       Modalities

## 2025-03-21 ENCOUNTER — OFFICE VISIT (OUTPATIENT)
Dept: PHYSICAL THERAPY | Facility: CLINIC | Age: 70
End: 2025-03-21
Payer: MEDICARE

## 2025-03-21 DIAGNOSIS — M17.0 BILATERAL PRIMARY OSTEOARTHRITIS OF KNEE: Primary | ICD-10-CM

## 2025-03-21 PROCEDURE — 97110 THERAPEUTIC EXERCISES: CPT | Performed by: PHYSICAL THERAPIST

## 2025-03-21 PROCEDURE — 97112 NEUROMUSCULAR REEDUCATION: CPT | Performed by: PHYSICAL THERAPIST

## 2025-03-21 NOTE — PROGRESS NOTES
Daily Note     Today's date: 3/21/2025  Patient name: Ruby Odonnell  : 1955  MRN: [id number]  Referring provider: Karla Lopez MD  Dx:   Encounter Diagnosis     ICD-10-CM    1. Bilateral primary osteoarthritis of knee  M17.0                      Subjective: Pt reports that her knees have been feeling really good. States that she is not having much pain. Completing HEP as prescribed. Unable to make it to the gym this week.       Objective: See treatment diary below      Assessment: Tolerated treatment well. Progressing balance. Moved to tandem stance on foam. Leg press tolerated well. Patient would benefit from continued PT      Plan: Continue per plan of care.  Progress treatment as tolerated.       Precautions: HTN    POC expires Unit limit Auth Expiration date PT/OT + Visit Limit?   25  No BOMN                             Code: AFKBEZHH Home; [id number] Gym    Manuals 3/7 3/10 3/14 3/17 3/21        Joint mobility   Assessment                                                 Neuro Re-Ed             Gait with head turns    8'         Backward walking             hurdles             Balance assessment  AUSTEN- see above           [place] Nino  AUSTEN- see above           Ball toss Romberg on foam     5'        Tandem stance    5' 10' on foam        EO Romberg on foam head turns   AUSTEN 10' 10'        Ther Ex             Pt edu AUSTEN AUSTEN- movement goal  AUSTEN- gym routine         bike    5' 5'        treadmill             Leg press    65# 2x10 plate at 9 45# 1x10 warm-up, 65# 2x15        Supine hip abd   RTB 2x20          MMT   Assessment AUSTEN                                    Ther Activity             5x STS   AUSTEN                       Gait Training                                       Modalities

## 2025-03-24 ENCOUNTER — OFFICE VISIT (OUTPATIENT)
Dept: PHYSICAL THERAPY | Facility: CLINIC | Age: 70
End: 2025-03-24
Payer: MEDICARE

## 2025-03-24 DIAGNOSIS — M17.0 BILATERAL PRIMARY OSTEOARTHRITIS OF KNEE: Primary | ICD-10-CM

## 2025-03-24 PROCEDURE — 97110 THERAPEUTIC EXERCISES: CPT | Performed by: PHYSICAL THERAPIST

## 2025-03-24 PROCEDURE — 97112 NEUROMUSCULAR REEDUCATION: CPT | Performed by: PHYSICAL THERAPIST

## 2025-03-24 NOTE — PROGRESS NOTES
Daily Note     Today's date: 3/24/2025  Patient name: Ruby Odonnell  : 1955  MRN: [id number]  Referring provider: Karla Lopez MD  Dx:   Encounter Diagnosis     ICD-10-CM    1. Bilateral primary osteoarthritis of knee  M17.0                      Subjective: Pt reports going to gym over the weekend. Slight symptoms in the knee at times yesterday, but not lasting. No symptoms this morning.       Objective: See treatment diary below      Assessment: Tolerated treatment well. Tandem stance on foam continues to improve. Added challenge to visual tracking with static balance. Will add dynamic balance piece at next follow-up. Patient would benefit from continued PT      Plan: Continue per plan of care.  Progress treatment as tolerated.       Precautions: HTN    POC expires Unit limit Auth Expiration date PT/OT + Visit Limit?   25  No BOMN                             Code: AFKBEZHH Home; [id number] Gym    Manuals 3/7 3/10 3/14 3/17 3/21 3/24       Joint mobility   Assessment                                                 Neuro Re-Ed             Gait with head turns    8'         Backward walking             hurdles             Balance assessment  AUSTEN- see above           [place] Nino  AUSTEN- see above                        Ball toss Romberg on foam     5' 10' changing therapist position       Tandem stance    5' 10' on foam 8' on foam       EO Romberg on foam head turns   AUSTEN 10' 10' 10'       Ther Ex             Pt edu AUSTEN AUSTEN- movement goal  AUSTEN- gym routine  AUSTEN       bike    5' 5' 6'       treadmill             Leg press    65# 2x10 plate at 9 45# 1x10 warm-up, 65# 2x15        Supine hip abd   RTB 2x20          MMT   Assessment AUSTEN                                    Ther Activity             5x STS   AUSTEN                       Gait Training                                       Modalities

## 2025-03-28 ENCOUNTER — APPOINTMENT (OUTPATIENT)
Dept: PHYSICAL THERAPY | Facility: CLINIC | Age: 70
End: 2025-03-28
Payer: MEDICARE

## 2025-03-31 ENCOUNTER — OFFICE VISIT (OUTPATIENT)
Dept: PHYSICAL THERAPY | Facility: CLINIC | Age: 70
End: 2025-03-31
Payer: MEDICARE

## 2025-03-31 DIAGNOSIS — M17.0 BILATERAL PRIMARY OSTEOARTHRITIS OF KNEE: Primary | ICD-10-CM

## 2025-03-31 PROCEDURE — 97110 THERAPEUTIC EXERCISES: CPT | Performed by: PHYSICAL THERAPIST

## 2025-03-31 PROCEDURE — 97112 NEUROMUSCULAR REEDUCATION: CPT | Performed by: PHYSICAL THERAPIST

## 2025-03-31 NOTE — PROGRESS NOTES
Daily Note     Today's date: 3/31/2025  Patient name: Ruby Odonnell  : 1955  MRN: [id number]  Referring provider: Karla Lopez MD  Dx:   Encounter Diagnosis     ICD-10-CM    1. Bilateral primary osteoarthritis of knee  M17.0                      Subjective: Pt reports that she is going to go back to the [facility] instead of [employer] to do the leg strengthening routine. Forgot to go to [place] [employer] this past week. No knee soreness.       Objective: See treatment diary below      Assessment: Tolerated treatment well. Able to progress through static and dynamic balance. Minimal LOB with walking ball toss and hurdles. Still significant challenge with SLS. Patient would benefit from continued PT      Plan: Continue per plan of care.  Progress treatment as tolerated.       Precautions: HTN    POC expires Unit limit Auth Expiration date PT/OT + Visit Limit?   25  No BOMN                             Code: AFKBEZHH Home; [id number] Gym    Manuals 3/7 3/10 3/14 3/17 3/21 3/24 3/31      Joint mobility   Assessment                                                 Neuro Re-Ed             Gait with head turns    8'         Backward walking             hurdles             Balance assessment  AUSTEN- see above           [place] Nino  AUSTEN- see above           SLS       8'      Ball toss Romberg on foam     5' 10' changing therapist position 10' changing therapist position      Tandem stance    5' 10' on foam 8' on foam 8' on foam      Hurdles       1/2 and over 5'      Walking ball toss       5'      EO Romberg on foam head turns   AUSTEN 10' 10' 10'       Ther Ex             Pt edu AUSTEN AUSTEN- movement goal  AUSTEN- gym routine  AUSTEN       bike    5' 5' 6' 6'      treadmill             Leg press    65# 2x10 plate at 9 45# 1x10 warm-up, 65# 2x15        Supine hip abd   RTB 2x20          MMT   Assessment AUSTEN                                    Ther Activity             5x STS   AUSTEN                       Gait Training                                        Modalities

## 2025-04-04 ENCOUNTER — APPOINTMENT (OUTPATIENT)
Dept: PHYSICAL THERAPY | Facility: CLINIC | Age: 70
End: 2025-04-04
Payer: MEDICARE

## 2025-04-07 ENCOUNTER — EVALUATION (OUTPATIENT)
Dept: PHYSICAL THERAPY | Facility: CLINIC | Age: 70
End: 2025-04-07
Payer: MEDICARE

## 2025-04-07 DIAGNOSIS — M17.0 BILATERAL PRIMARY OSTEOARTHRITIS OF KNEE: Primary | ICD-10-CM

## 2025-04-07 PROCEDURE — 97110 THERAPEUTIC EXERCISES: CPT | Performed by: PHYSICAL THERAPIST

## 2025-04-07 PROCEDURE — 97112 NEUROMUSCULAR REEDUCATION: CPT | Performed by: PHYSICAL THERAPIST

## 2025-04-07 NOTE — PROGRESS NOTES
PT Re-evaluation     Today's date: 2025  Patient name: Ruby Odonnell  : 1955  MRN: [id number]  Referring provider: Karla Lopez MD  Dx:   Encounter Diagnosis     ICD-10-CM    1. Bilateral primary osteoarthritis of knee  M17.0                          Assessment  Impairments: abnormal gait, abnormal or restricted ROM, activity intolerance, impaired physical strength and pain with function  Symptom irritability: low    Assessment details: Pt is a 69 y/o female who presents to physical therapy with primary nociceptive pain associated with b/l knee OA in the environment of reduced hip strength, visual changes, reduced knee strength complicated by fear of falling. Significant improvement in balance measures, total activity level, and strength since first day. However, she continues to have diminished balance and strength and would continue to benefit from skilled physical therapy in order to decrease deficits and return to prior level of function.    Understanding of Dx/Px/POC: good    Goals  STG (4 weeks): - MET  Pt will be independent with HEP.  LMA movement >5.  DGI >17.    LTG (8 weeks): - MET  FOTO will be expected outcome.   LMA movement >7.  DGI >19.  Pt will demonstrate SLS >20s b/l.  Pt will demonstrate hip abd MMT >4-/5 b/l.      Plan  Patient would benefit from: skilled physical therapy  Planned modality interventions: cryotherapy    Planned therapy interventions: manual therapy, neuromuscular re-education, patient education, self care, strengthening, stretching, therapeutic activities, therapeutic exercise and home exercise program    Frequency: 1-2x/week.  Duration in weeks: 8  Treatment plan discussed with: patient    Subjective Evaluation    History of Present Illness  Mechanism of injury: (24): Pt reports that she began having b/l knee pain 5 years ago. She states that it continued to increase until 2 years ago until she went to get it looked at. Sports med MD administered CSI 1x  into each knee and within 20 mins she had full relief of symptoms. She states that 7 weeks later she got gel injections, lasted 3-6 months and then CSI and gel, lasted about 6 months. Most recent gel injection 3.5 weeks ago. Each time the pain comes back, pt reports her symptoms are of gradual onset. Pt was seen through the beginning of December at which point her function was improved. She states that she also received CSI injections into both knees in mid December that largely reduced her pain. She states that since then she has been sporadic with HEP, but still continues to complete it. She states that usually CSI's begin to wear off around the 6 week quin so this is coming up for her now. She would like to return to the gym and plans on using the treadmill.     (3/7/25): Pt reports that since the last visit, she has stopped going to the gym and doing exercise at home. She reports that she will do walking at local stores most days though. She reports she continues to get injections every three months with good improvement with CSI (although her pain never gets that bad). She states that her MD recommended she come back to PT to begin to do more exercise.    She also reports that she is following her nutrition plan closely and still having trouble losing weight. She was dx with DM since last visit. She states that she is hesitant to try GLP-1 medication due to the recency of the meds and the potential side effects. She states that she believes the weight has something to do with her knee pain but believes the best way to achieve this is through exercise.     (4/7/25): Pt reports an improvement in symptoms since IE. She states that she has been hitting agreed upon goal of increased activity weekly, and achieving >150 mins of aerobic activity. She states her balance continues to improve and she is reporting more confidence.     Severity: low  Irritability: low  Nature:nociceptive  Stage: chronic  Stability:  Stable    P1: see body chart    Occupation: Retired  Patient Goals  Patient goal: get on a regular exercise routine    Objective     Observations     Additional Observation Details  Reduced L knee ext in standing  Slight offloading of the L LE in quiet standing    Stairs up:  R LE some difficulty no pain  L LE some difficulty no pain    Stairs down:  R LE WNL  L LE no pain and lack of eccentric control    Gait: no pain but trendelenburg noted b/l    SLS: L LE no pain but slight trendelenburg on the L, increased sway on the L    Neurological Testing     Additional Neurological Details  Lumbar ROM screen- WNL and no recreation of symptoms with OP in straight plane motions    Vision: good ability to track with both eyes, peripheral vision WNL- reports of wavy vision but no areas of the vision that are blacked out    Balance:  DGI: 23     MCTSIB:   1,2,3,4 WNL    Tandem stance:  L:30s (4/7): 30+s  R:10s; (4/7): 27s    Foam:  (4/7) L: 22s  (4/7) R: 21s    SLS:  (4/7) L: 17s  (4/7) R: 13s    Romberg on foam head turns:  L: 5s significant sway where pt had to reduce her speed of head turns  R: 5s significant sway where pt had to reduce her speed of head turns    Active Range of Motion   Left Knee   Flexion: 130 degrees   Extension: 0 degrees     Right Knee   Flexion: 127 degrees   Extension: 0 degrees     Strength:  Hip abd MMT:     LMA:  Movement pillar: 8/10       Precautions: HTN    POC expires Unit limit Auth Expiration date PT/OT + Visit Limit?   5/2/25  No BOMN                               Manuals 3/7 3/10 3/14 3/17 3/21 3/24 3/31 4/7     Joint mobility   Assessment                                                 Neuro Re-Ed             Gait with head turns    8'         Backward walking             hurdles             Balance assessment  AUSTEN- see above           [place] Nino  AUSTEN- see above           SLS       8'      Ball toss Romberg on foam     5' 10' changing therapist position 10' changing therapist position       Tandem stance    5' 10' on foam 8' on foam 8' on foam      Hurdles       1/2 and over 5'      Walking ball toss       5'      EO Romberg on foam head turns   AUSTEN 10' 10' 10'       Ther Ex             Pt edu AUSTEN AUSTEN- movement goal  AUSTEN- gym routine  AUSTEN  AUSTEN     bike    5' 5' 6' 6'      treadmill             Leg press    65# 2x10 plate at 9 45# 1x10 warm-up, 65# 2x15        Supine hip abd   RTB 2x20          MMT   Assessment AUSTEN                                    Ther Activity             5x STS   AUSTEN                       Gait Training                                       Modalities

## 2025-04-08 ENCOUNTER — TELEPHONE (OUTPATIENT)
Age: 70
End: 2025-04-08

## 2025-04-08 ENCOUNTER — OFFICE VISIT (OUTPATIENT)
Dept: INTERNAL MEDICINE CLINIC | Facility: CLINIC | Age: 70
End: 2025-04-08
Payer: MEDICARE

## 2025-04-08 VITALS
BODY MASS INDEX: 39.4 KG/M2 | HEIGHT: 69 IN | SYSTOLIC BLOOD PRESSURE: 124 MMHG | RESPIRATION RATE: 17 BRPM | HEART RATE: 75 BPM | OXYGEN SATURATION: 96 % | DIASTOLIC BLOOD PRESSURE: 82 MMHG | TEMPERATURE: 98.9 F | WEIGHT: 266 LBS

## 2025-04-08 DIAGNOSIS — J20.9 ACUTE BRONCHITIS WITH BRONCHOSPASM: Primary | ICD-10-CM

## 2025-04-08 PROCEDURE — 99213 OFFICE O/P EST LOW 20 MIN: CPT | Performed by: INTERNAL MEDICINE

## 2025-04-08 PROCEDURE — G2211 COMPLEX E/M VISIT ADD ON: HCPCS | Performed by: INTERNAL MEDICINE

## 2025-04-08 RX ORDER — METHYLPREDNISOLONE 4 MG/1
TABLET ORAL
Qty: 21 EACH | Refills: 0 | Status: SHIPPED | OUTPATIENT
Start: 2025-04-08

## 2025-04-08 RX ORDER — ALBUTEROL SULFATE 90 UG/1
2 INHALANT RESPIRATORY (INHALATION) EVERY 6 HOURS PRN
Qty: 18 G | Refills: 1 | Status: SHIPPED | OUTPATIENT
Start: 2025-04-08

## 2025-04-08 NOTE — PROGRESS NOTES
"Name: Ruby Odonnell      : 1955      MRN: [id number]  Encounter Provider: Anil Ramos MD  Encounter Date: 2025   Encounter department: [facility] INTERNAL MEDICINE [place]  :  Assessment & Plan  Acute bronchitis with bronchospasm  Since she feels she is overall on the mend, will start with inhaler for lingering symptoms.  Orders:  •  albuterol (Ventolin HFA) 90 mcg/act inhaler; Inhale 2 puffs every 6 (six) hours as needed for shortness of breath  •  methylPREDNISolone 4 MG tablet therapy pack; Use as directed on package           History of Present Illness   Patient here complaining of congestion and tightness like she had before in December.  Responded well to steroids but leery about her sugars.  Feels she is getting better but the tightness at night bothers her.        Review of Systems   Constitutional:  Negative for fever.       Objective   /82 (BP Location: Left arm, Patient Position: Sitting, Cuff Size: Large)   Pulse 75   Temp 98.9 °F (37.2 °C) (Tympanic)   Resp 17   Ht 5' 9\" (1.753 m)   Wt 121 kg (266 lb)   SpO2 96%   BMI 39.28 kg/m²      Physical Exam  Vitals and nursing note reviewed.   Constitutional:       Appearance: Normal appearance. She is not ill-appearing.   Cardiovascular:      Rate and Rhythm: Normal rate and regular rhythm.   Pulmonary:      Breath sounds: No wheezing or rhonchi.   Neurological:      Mental Status: She is alert.         "

## 2025-04-08 NOTE — TELEPHONE ENCOUNTER
Patient was seen in office today, 4/8/25, and diagnosed with bronchitis.    Patient would like to know if this is contagious as she is scheduled to babysit her grandson at [place] today.    Please call back as soon as possible to advise.

## 2025-04-11 ENCOUNTER — APPOINTMENT (OUTPATIENT)
Dept: PHYSICAL THERAPY | Facility: CLINIC | Age: 70
End: 2025-04-11
Payer: MEDICARE

## 2025-04-14 ENCOUNTER — OFFICE VISIT (OUTPATIENT)
Dept: PHYSICAL THERAPY | Facility: CLINIC | Age: 70
End: 2025-04-14
Payer: MEDICARE

## 2025-04-14 DIAGNOSIS — M17.0 BILATERAL PRIMARY OSTEOARTHRITIS OF KNEE: Primary | ICD-10-CM

## 2025-04-14 PROCEDURE — 97112 NEUROMUSCULAR REEDUCATION: CPT | Performed by: PHYSICAL THERAPIST

## 2025-04-14 PROCEDURE — 97110 THERAPEUTIC EXERCISES: CPT | Performed by: PHYSICAL THERAPIST

## 2025-04-14 NOTE — PROGRESS NOTES
Daily Note     Today's date: 2025  Patient name: Ruby Odonnell  : 1955  MRN: [id number]  Referring provider: Karla Lopez MD  Dx:   Encounter Diagnosis     ICD-10-CM    1. Bilateral primary osteoarthritis of knee  M17.0                      Subjective: Pt reports that she was unable to complete as much exercise this past week because she had bronchitis. Is reporting that she feels her knees have been feeling more reliable.       Objective: See treatment diary below      Assessment: Tolerated treatment well. Pt demonstrated a reduction in her static balance today compared to previous visit, pt reported she hasn't been as Jain with this exercise at home this past week due to the illness. Able to complete all others with similar balance compared to previous visit. Patient would benefit from continued PT      Plan: Continue per plan of care.  Progress treatment as tolerated.       Precautions: HTN    POC expires Unit limit Auth Expiration date PT/OT + Visit Limit?   25  No BOMN                             Code: [email or web address] Home; [id number] Gym    Manuals 3/7 3/10 3/14 3/17 3/21 3/24 3/31 4/7 4/14    Joint mobility   Assessment                                                 Neuro Re-Ed             Gait with head turns    8'         Backward walking             hurdles             Balance assessment  AUSTEN- see above           Yohan Nino  AUSTEN- see above           SLS       8'  4'    Ball toss Romberg on foam     5' 10' changing therapist position 10' changing therapist position  5'    Tandem stance    5' 10' on foam 8' on foam 8' on foam  4'    Hurdles       1/2 and over 5'  8x     Walking ball toss       5'      EO Romberg on foam head turns   AUSTEN 10' 10' 10'       Ther Ex             Pt edu AUSTEN AUSTEN- movement goal  AUSTEN- gym routine  AUSTEN  AUSTEN AUSTEN    bike    5' 5' 6' 6'  6'    treadmill             Leg press    65# 2x10 plate at 9 45# 1x10 warm-up, 65# 2x15    45# 1x10 warm-up, 65# 3x15    Supine hip  abd   RTB 2x20          MMT   Assessment AUSTEN                                    Ther Activity             5x STS   AUSTEN                       Gait Training                                       Modalities

## 2025-04-18 ENCOUNTER — APPOINTMENT (OUTPATIENT)
Dept: PHYSICAL THERAPY | Facility: CLINIC | Age: 70
End: 2025-04-18
Payer: MEDICARE

## 2025-04-21 ENCOUNTER — APPOINTMENT (OUTPATIENT)
Dept: PHYSICAL THERAPY | Facility: CLINIC | Age: 70
End: 2025-04-21
Payer: MEDICARE

## 2025-04-22 ENCOUNTER — OFFICE VISIT (OUTPATIENT)
Dept: PHYSICAL THERAPY | Facility: CLINIC | Age: 70
End: 2025-04-22
Attending: FAMILY MEDICINE
Payer: MEDICARE

## 2025-04-22 DIAGNOSIS — M17.0 BILATERAL PRIMARY OSTEOARTHRITIS OF KNEE: Primary | ICD-10-CM

## 2025-04-22 PROCEDURE — 97112 NEUROMUSCULAR REEDUCATION: CPT | Performed by: PHYSICAL THERAPIST

## 2025-04-22 NOTE — PROGRESS NOTES
Daily Note     Today's date: 2025  Patient name: Ruby Odonnell  : 1955  MRN: [id number]  Referring provider: Karla Lopez MD  Dx:   Encounter Diagnosis     ICD-10-CM    1. Bilateral primary osteoarthritis of knee  M17.0                      Subjective: Pt reports that she had a busy week last week and missed one day of walking. Notes some soreness below the L knee after gym routine.       Objective: See treatment diary below      Assessment: Tolerated treatment well. Able to complete routine without pain. Neuromuscular coordination improving, planning for d/c at next visit. Patient would benefit from continued PT      Plan: Continue per plan of care.  Progress treatment as tolerated.       Precautions: HTN    POC expires Unit limit Auth Expiration date PT/OT + Visit Limit?   25  No BOMN                             Code: AFKBEZHH Home; [id number] Gym    Manuals 3/7 3/10 3/14 3/17 3/21 3/24 3/31 4/7 4/14 4/22   Joint mobility   Assessment                                                 Neuro Re-Ed             Gait with head turns    8'         Backward walking             hurdles             Balance assessment  AUSTEN- see above           [place] Nino  AUSTEN- see above           SLS       8'  4' 5'   Ball toss Romberg on foam     5' 10' changing therapist position 10' changing therapist position  5'    Tandem stance    5' 10' on foam 8' on foam 8' on foam  4' 5'   Hurdles       1/2 and over 5'  8x  8x   Walking ball toss       5'   5'   EO Romberg on foam head turns   AUSTEN 10' 10' 10'       Ther Ex             Pt edu AUSTEN AUSTEN- movement goal  AUSTEN- gym routine  AUSTEN  AUSTEN AUSTEN AUSTEN   bike    5' 5' 6' 6'  6' 6'   treadmill             Leg press    65# 2x10 plate at 9 45# 1x10 warm-up, 65# 2x15    45# 1x10 warm-up, 65# 3x15    Supine hip abd   RTB 2x20          MMT   Assessment AUSTEN                                    Ther Activity             5x STS   AUSTEN                       Gait Training                                        Modalities

## 2025-04-23 ENCOUNTER — APPOINTMENT (OUTPATIENT)
Dept: PHYSICAL THERAPY | Facility: CLINIC | Age: 70
End: 2025-04-23
Attending: FAMILY MEDICINE
Payer: MEDICARE

## 2025-04-25 ENCOUNTER — APPOINTMENT (OUTPATIENT)
Dept: PHYSICAL THERAPY | Facility: CLINIC | Age: 70
End: 2025-04-25
Payer: MEDICARE

## 2025-04-28 ENCOUNTER — OFFICE VISIT (OUTPATIENT)
Dept: PHYSICAL THERAPY | Facility: CLINIC | Age: 70
End: 2025-04-28
Payer: MEDICARE

## 2025-04-28 DIAGNOSIS — M17.0 BILATERAL PRIMARY OSTEOARTHRITIS OF KNEE: Primary | ICD-10-CM

## 2025-04-28 PROCEDURE — 97112 NEUROMUSCULAR REEDUCATION: CPT | Performed by: PHYSICAL THERAPIST

## 2025-04-28 NOTE — PROGRESS NOTES
PT Discharge     Today's date: 2025  Patient name: Ruby Odonnell  : 1955  MRN: [id number]  Referring provider: Karla Lopez MD  Dx:   Encounter Diagnosis     ICD-10-CM    1. Bilateral primary osteoarthritis of knee  M17.0                          Assessment  Impairments: abnormal gait, abnormal or restricted ROM, activity intolerance, impaired physical strength and pain with function  Symptom irritability: low    Assessment details: Pt is a 69 y/o female who presents to physical therapy with primary nociceptive pain associated with b/l knee OA in the environment of reduced hip strength, visual changes, reduced knee strength complicated by fear of falling. Significant improvement in balance measures, total activity level, and strength since first day. Skilled therapy no longer indicated. PT and pt discussed d/c, pt agreeable.     Understanding of Dx/Px/POC: good    Goals  STG (4 weeks): - MET  Pt will be independent with HEP.  LMA movement >5.  DGI >17.    LTG (8 weeks): - MET  FOTO will be expected outcome.   LMA movement >7.  DGI >19.  Pt will demonstrate SLS >20s b/l.  Pt will demonstrate hip abd MMT >4-/5 b/l.      Plan  Patient would benefit from: skilled physical therapy  Planned modality interventions: cryotherapy    Planned therapy interventions: manual therapy, neuromuscular re-education, patient education, self care, strengthening, stretching, therapeutic activities, therapeutic exercise and home exercise program    Frequency: 1-2x/week.  Duration in weeks: 8  Treatment plan discussed with: patient    Subjective Evaluation    History of Present Illness  Mechanism of injury: (24): Pt reports that she began having b/l knee pain 5 years ago. She states that it continued to increase until 2 years ago until she went to get it looked at. Sports med MD administered CSI 1x into each knee and within 20 mins she had full relief of symptoms. She states that 7 weeks later she got gel  injections, lasted 3-6 months and then CSI and gel, lasted about 6 months. Most recent gel injection 3.5 weeks ago. Each time the pain comes back, pt reports her symptoms are of gradual onset. Pt was seen through the beginning of December at which point her function was improved. She states that she also received CSI injections into both knees in mid December that largely reduced her pain. She states that since then she has been sporadic with HEP, but still continues to complete it. She states that usually CSI's begin to wear off around the 6 week quin so this is coming up for her now. She would like to return to the gym and plans on using the treadmill.     (3/7/25): Pt reports that since the last visit, she has stopped going to the gym and doing exercise at home. She reports that she will do walking at local stores most days though. She reports she continues to get injections every three months with good improvement with CSI (although her pain never gets that bad). She states that her MD recommended she come back to PT to begin to do more exercise.    She also reports that she is following her nutrition plan closely and still having trouble losing weight. She was dx with DM since last visit. She states that she is hesitant to try GLP-1 medication due to the recency of the meds and the potential side effects. She states that she believes the weight has something to do with her knee pain but believes the best way to achieve this is through exercise.     (4/7/25): Pt reports an improvement in symptoms since IE. She states that she has been hitting agreed upon goal of increased activity weekly, and achieving >150 mins of aerobic activity. She states her balance continues to improve and she is reporting more confidence.     (4/28): Pt repors that her symptoms continue to improve. Still noting some swelling in the knee. Challenge with standing for a long period of time. However, balance and confidence with walking on  uneven surfaces continues to improve.    Severity: low  Irritability: low  Nature:nociceptive  Stage: chronic  Stability: Stable    P1: see body chart    Occupation: Retired  Patient Goals  Patient goal: get on a regular exercise routine    Objective     Observations     Additional Observation Details  Reduced L knee ext in standing  Slight offloading of the L LE in quiet standing    Stairs up:  R LE some difficulty no pain  L LE some difficulty no pain    Stairs down:  R LE WNL  L LE no pain and lack of eccentric control    Gait: no pain but trendelenburg noted b/l    SLS: L LE no pain but slight trendelenburg on the L, increased sway on the L    Neurological Testing     Additional Neurological Details  Lumbar ROM screen- WNL and no recreation of symptoms with OP in straight plane motions    Vision: good ability to track with both eyes, peripheral vision WNL- reports of wavy vision but no areas of the vision that are blacked out    Balance:  DGI: 23     MCTSIB:   1,2,3,4 WNL    Tandem stance:  L:30s (4/7): 30+s; (4/28) 30+s  R:10s; (4/7): 27s; (4/28) 30+s    Foam tandem:  (4/7) L: 22s  (4/7) R: 21s; (4/28): 21s    SLS:  (4/7) L: 17s; (4/28): 17s  (4/7) R: 13s; (4/28): 13s    Romberg on foam head turns:  L: 5s significant sway where pt had to reduce her speed of head turns  R: 5s significant sway where pt had to reduce her speed of head turns    Active Range of Motion   Left Knee   Flexion: 130 degrees   Extension: 0 degrees     Right Knee   Flexion: 127 degrees   Extension: 0 degrees     Strength:  Hip abd MMT:     LMA:  Movement pillar: 8/10       Precautions: HTN    POC expires Unit limit Auth Expiration date PT/OT + Visit Limit?   5/2/25  No BOMN                               Manuals 4/28 3/10 3/14 3/17 3/21 3/24 3/31 4/7 4/14 4/22   Joint mobility   Assessment                                                 Neuro Re-Ed             Gait with head turns    8'         Backward walking             hurdles              Balance assessment  AUSTEN- see above           Yohan Oneill  AUSTEN- see above           SLS 5'      8'  4' 5'   Ball toss Romberg on foam     5' 10' changing therapist position 10' changing therapist position  5'    Tandem stance 5'   5' 10' on foam 8' on foam 8' on foam  4' 5'   Hurdles 10x      1/2 and over 5'  8x  8x   Walking ball toss 5'      5'   5'   EO Romberg on foam head turns   AUSTEN 10' 10' 10'       Ther Ex             Pt edu  AUSTEN- movement goal  AUSTEN- gym routine  AUSTEN  AUSTEN AUSTEN AUSTEN   bike    5' 5' 6' 6'  6' 6'   treadmill             Leg press    65# 2x10 plate at 9 45# 1x10 warm-up, 65# 2x15    45# 1x10 warm-up, 65# 3x15    Supine hip abd   RTB 2x20          MMT   Assessment AUSTEN                                    Ther Activity             5x STS   AUSTEN                       Gait Training                                       Modalities

## 2025-05-04 DIAGNOSIS — E78.2 MIXED HYPERLIPIDEMIA: ICD-10-CM

## 2025-05-04 DIAGNOSIS — I10 BENIGN ESSENTIAL HYPERTENSION: ICD-10-CM

## 2025-05-05 RX ORDER — SIMVASTATIN 40 MG
40 TABLET ORAL
Qty: 90 TABLET | Refills: 3 | Status: SHIPPED | OUTPATIENT
Start: 2025-05-05

## 2025-05-05 RX ORDER — LOSARTAN POTASSIUM AND HYDROCHLOROTHIAZIDE 25; 100 MG/1; MG/1
1 TABLET ORAL DAILY
Qty: 90 TABLET | Refills: 3 | Status: SHIPPED | OUTPATIENT
Start: 2025-05-05

## 2025-06-02 ENCOUNTER — APPOINTMENT (OUTPATIENT)
Dept: LAB | Facility: CLINIC | Age: 70
End: 2025-06-02
Payer: MEDICARE

## 2025-06-02 DIAGNOSIS — E11.9 TYPE 2 DIABETES MELLITUS WITHOUT COMPLICATION, WITHOUT LONG-TERM CURRENT USE OF INSULIN (HCC): ICD-10-CM

## 2025-06-02 LAB
ALBUMIN SERPL BCG-MCNC: 4.4 G/DL (ref 3.5–5)
ALP SERPL-CCNC: 52 U/L (ref 34–104)
ALT SERPL W P-5'-P-CCNC: 18 U/L (ref 7–52)
ANION GAP SERPL CALCULATED.3IONS-SCNC: 8 MMOL/L (ref 4–13)
AST SERPL W P-5'-P-CCNC: 19 U/L (ref 13–39)
BASOPHILS # BLD AUTO: 0.05 THOUSANDS/ÂΜL (ref 0–0.1)
BASOPHILS NFR BLD AUTO: 1 % (ref 0–1)
BILIRUB SERPL-MCNC: 1.75 MG/DL (ref 0.2–1)
BUN SERPL-MCNC: 20 MG/DL (ref 5–25)
CALCIUM SERPL-MCNC: 9.8 MG/DL (ref 8.4–10.2)
CHLORIDE SERPL-SCNC: 97 MMOL/L (ref 96–108)
CHOLEST SERPL-MCNC: 145 MG/DL (ref ?–200)
CO2 SERPL-SCNC: 32 MMOL/L (ref 21–32)
CREAT SERPL-MCNC: 0.55 MG/DL (ref 0.6–1.3)
EOSINOPHIL # BLD AUTO: 0.1 THOUSAND/ÂΜL (ref 0–0.61)
EOSINOPHIL NFR BLD AUTO: 2 % (ref 0–6)
ERYTHROCYTE [DISTWIDTH] IN BLOOD BY AUTOMATED COUNT: 13.4 % (ref 11.6–15.1)
EST. AVERAGE GLUCOSE BLD GHB EST-MCNC: 163 MG/DL
GFR SERPL CREATININE-BSD FRML MDRD: 94 ML/MIN/1.73SQ M
GLUCOSE P FAST SERPL-MCNC: 173 MG/DL (ref 65–99)
HBA1C MFR BLD: 7.3 %
HCT VFR BLD AUTO: 43.3 % (ref 34.8–46.1)
HDLC SERPL-MCNC: 37 MG/DL
HGB BLD-MCNC: 14.5 G/DL (ref 11.5–15.4)
IMM GRANULOCYTES # BLD AUTO: 0.02 THOUSAND/UL (ref 0–0.2)
IMM GRANULOCYTES NFR BLD AUTO: 0 % (ref 0–2)
LDLC SERPL CALC-MCNC: 43 MG/DL (ref 0–100)
LYMPHOCYTES # BLD AUTO: 2.39 THOUSANDS/ÂΜL (ref 0.6–4.47)
LYMPHOCYTES NFR BLD AUTO: 37 % (ref 14–44)
MCH RBC QN AUTO: 30 PG (ref 26.8–34.3)
MCHC RBC AUTO-ENTMCNC: 33.5 G/DL (ref 31.4–37.4)
MCV RBC AUTO: 90 FL (ref 82–98)
MONOCYTES # BLD AUTO: 0.51 THOUSAND/ÂΜL (ref 0.17–1.22)
MONOCYTES NFR BLD AUTO: 8 % (ref 4–12)
NEUTROPHILS # BLD AUTO: 3.43 THOUSANDS/ÂΜL (ref 1.85–7.62)
NEUTS SEG NFR BLD AUTO: 52 % (ref 43–75)
NRBC BLD AUTO-RTO: 0 /100 WBCS
PLATELET # BLD AUTO: 186 THOUSANDS/UL (ref 149–390)
PMV BLD AUTO: 12 FL (ref 8.9–12.7)
POTASSIUM SERPL-SCNC: 4 MMOL/L (ref 3.5–5.3)
PROT SERPL-MCNC: 7.1 G/DL (ref 6.4–8.4)
RBC # BLD AUTO: 4.84 MILLION/UL (ref 3.81–5.12)
SODIUM SERPL-SCNC: 137 MMOL/L (ref 135–147)
TRIGL SERPL-MCNC: 326 MG/DL (ref ?–150)
WBC # BLD AUTO: 6.5 THOUSAND/UL (ref 4.31–10.16)

## 2025-06-02 PROCEDURE — 80061 LIPID PANEL: CPT

## 2025-06-02 PROCEDURE — 83036 HEMOGLOBIN GLYCOSYLATED A1C: CPT

## 2025-06-02 PROCEDURE — 36415 COLL VENOUS BLD VENIPUNCTURE: CPT

## 2025-06-02 PROCEDURE — 85025 COMPLETE CBC W/AUTO DIFF WBC: CPT

## 2025-06-02 PROCEDURE — 80053 COMPREHEN METABOLIC PANEL: CPT

## 2025-06-09 ENCOUNTER — RA CDI HCC (OUTPATIENT)
Dept: OTHER | Facility: HOSPITAL | Age: 70
End: 2025-06-09

## 2025-06-13 ENCOUNTER — OFFICE VISIT (OUTPATIENT)
Dept: INTERNAL MEDICINE CLINIC | Facility: CLINIC | Age: 70
End: 2025-06-13
Payer: MEDICARE

## 2025-06-13 VITALS
BODY MASS INDEX: 39.99 KG/M2 | HEIGHT: 69 IN | DIASTOLIC BLOOD PRESSURE: 84 MMHG | WEIGHT: 270 LBS | RESPIRATION RATE: 17 BRPM | HEART RATE: 66 BPM | SYSTOLIC BLOOD PRESSURE: 132 MMHG | OXYGEN SATURATION: 99 %

## 2025-06-13 DIAGNOSIS — E11.9 TYPE 2 DIABETES MELLITUS WITHOUT COMPLICATION, WITHOUT LONG-TERM CURRENT USE OF INSULIN (HCC): Primary | ICD-10-CM

## 2025-06-13 DIAGNOSIS — E78.2 MIXED HYPERLIPIDEMIA: ICD-10-CM

## 2025-06-13 DIAGNOSIS — I10 BENIGN ESSENTIAL HYPERTENSION: ICD-10-CM

## 2025-06-13 PROCEDURE — 99214 OFFICE O/P EST MOD 30 MIN: CPT | Performed by: INTERNAL MEDICINE

## 2025-06-13 PROCEDURE — G2211 COMPLEX E/M VISIT ADD ON: HCPCS | Performed by: INTERNAL MEDICINE

## 2025-06-13 NOTE — ASSESSMENT & PLAN NOTE
Improving control.  Again discussed increasing the metformin or GLP-1's.  She still wants to think about it.  Lab Results   Component Value Date    HGBA1C 7.3 (H) 06/02/2025       Orders:  •  CBC and differential; Future  •  Hemoglobin A1C; Future

## 2025-06-13 NOTE — PROGRESS NOTES
"Name: Ruby Odonnell      : 1955      MRN: [id number]  Encounter Provider: Anil Ramos MD  Encounter Date: 2025   Encounter department: [facility] INTERNAL MEDICINE [place]  :  Assessment & Plan  Type 2 diabetes mellitus without complication, without long-term current use of insulin (HCC)  Improving control.  Again discussed increasing the metformin or GLP-1's.  She still wants to think about it.  Lab Results   Component Value Date    HGBA1C 7.3 (H) 2025       Orders:  •  CBC and differential; Future  •  Hemoglobin A1C; Future    Benign essential hypertension  Controlled.  Orders:  •  Comprehensive metabolic panel; Future    Mixed hyperlipidemia  Controlled.  Continue current medication.  Orders:  •  Lipid panel; Future           History of Present Illness   Patient comes in today for routine follow-up.  She states she is doing okay.  She started walking more and working on her sugar further.  A1c came down slightly.  Still a little high but better.  Blood pressure was initially high but came down upon repeat.  Cholesterol is doing okay except for the triglycerides.  Taking her medicines as directed.  Denies any new complaints today.  No further additions to her history.      Review of Systems   Respiratory:  Negative for shortness of breath.    Cardiovascular:  Negative for chest pain.   Gastrointestinal:  Negative for abdominal pain.       Objective   /84   Pulse 66   Resp 17   Ht 5' 9\" (1.753 m)   Wt 122 kg (270 lb)   SpO2 99%   BMI 39.87 kg/m²      Physical Exam  Vitals and nursing note reviewed.   Constitutional:       Appearance: Normal appearance. She is well-developed.     Cardiovascular:      Rate and Rhythm: Normal rate and regular rhythm.      Heart sounds: Normal heart sounds.   Pulmonary:      Effort: Pulmonary effort is normal.      Breath sounds: Normal breath sounds.   Abdominal:      Palpations: Abdomen is soft.      Tenderness: There is no abdominal " tenderness.     Neurological:      Mental Status: She is alert and oriented to person, place, and time.     Psychiatric:         Mood and Affect: Mood normal.         Behavior: Behavior normal.

## 2025-07-22 DIAGNOSIS — I10 BENIGN ESSENTIAL HYPERTENSION: ICD-10-CM

## 2025-07-23 ENCOUNTER — OFFICE VISIT (OUTPATIENT)
Dept: INTERNAL MEDICINE CLINIC | Facility: CLINIC | Age: 70
End: 2025-07-23
Payer: MEDICARE

## 2025-07-23 VITALS
HEART RATE: 70 BPM | BODY MASS INDEX: 39.87 KG/M2 | OXYGEN SATURATION: 96 % | TEMPERATURE: 98.3 F | DIASTOLIC BLOOD PRESSURE: 82 MMHG | SYSTOLIC BLOOD PRESSURE: 126 MMHG | HEIGHT: 69 IN

## 2025-07-23 DIAGNOSIS — E11.9 TYPE 2 DIABETES MELLITUS WITHOUT COMPLICATION, WITHOUT LONG-TERM CURRENT USE OF INSULIN (HCC): ICD-10-CM

## 2025-07-23 DIAGNOSIS — I10 BENIGN ESSENTIAL HYPERTENSION: ICD-10-CM

## 2025-07-23 DIAGNOSIS — J20.9 ACUTE BRONCHITIS, UNSPECIFIED ORGANISM: Primary | ICD-10-CM

## 2025-07-23 PROCEDURE — G2211 COMPLEX E/M VISIT ADD ON: HCPCS | Performed by: INTERNAL MEDICINE

## 2025-07-23 PROCEDURE — 99213 OFFICE O/P EST LOW 20 MIN: CPT | Performed by: INTERNAL MEDICINE

## 2025-07-23 RX ORDER — ATENOLOL 50 MG/1
100 TABLET ORAL
Qty: 180 TABLET | Refills: 1 | Status: SHIPPED | OUTPATIENT
Start: 2025-07-23

## 2025-07-23 RX ORDER — AZITHROMYCIN 250 MG/1
TABLET, FILM COATED ORAL
Qty: 6 TABLET | Refills: 0 | Status: SHIPPED | OUTPATIENT
Start: 2025-07-23 | End: 2025-07-27

## 2025-07-23 RX ORDER — METHYLPREDNISOLONE 4 MG/1
TABLET ORAL
Qty: 21 EACH | Refills: 0 | Status: SHIPPED | OUTPATIENT
Start: 2025-07-23

## 2025-07-23 NOTE — ASSESSMENT & PLAN NOTE
Watch sugars while on the steroids.  Lab Results   Component Value Date    HGBA1C 7.3 (H) 06/02/2025

## 2025-07-23 NOTE — PROGRESS NOTES
"Name: Ruby Odonnell      : 1955      MRN: [id number]  Encounter Provider: Anil Ramos MD  Encounter Date: 2025   Encounter department: [facility] INTERNAL MEDICINE [place]  :  Assessment & Plan  Acute bronchitis, unspecified organism  Early in the course.  Continue the inhaler.  Start the steroids.  Only if worsens and cough becomes productive should she start the antibiotics.  Orders:  •  methylPREDNISolone 4 MG tablet therapy pack; Use as directed on package  •  azithromycin (ZITHROMAX) 250 mg tablet; 2 tabs today then 1 tab daily for 4 days    Type 2 diabetes mellitus without complication, without long-term current use of insulin ([facility])  Watch sugars while on the steroids.  Lab Results   Component Value Date    HGBA1C 7.3 (H) 2025            Benign essential hypertension  Avoid OTC decongestants.              History of Present Illness   Patient comes in today complaining of cough and upper chest congestion.  States it feels like her prior episodes with bronchitis.  She tried the inhaler that she had from her last bout, maybe helped a little.  No fevers.  No one else is sick at home.      Review of Systems   Respiratory:  Positive for cough and chest tightness. Negative for shortness of breath.    Cardiovascular:  Negative for chest pain.   Gastrointestinal:  Negative for abdominal pain.       Objective   /82 (BP Location: Left arm, Patient Position: Sitting, Cuff Size: Large)   Pulse 70   Temp 98.3 °F (36.8 °C) (Tympanic)   Ht 5' 9\" (1.753 m)   SpO2 96%   BMI 39.87 kg/m²      Physical Exam  Vitals and nursing note reviewed.   Constitutional:       Appearance: Normal appearance. She is well-developed. She is not ill-appearing.   HENT:      Right Ear: External ear normal.      Left Ear: External ear normal.      Mouth/Throat:      Pharynx: No oropharyngeal exudate or posterior oropharyngeal erythema.     Eyes:      Conjunctiva/sclera: Conjunctivae normal. "       Cardiovascular:      Rate and Rhythm: Normal rate and regular rhythm.   Pulmonary:      Effort: Pulmonary effort is normal.      Breath sounds: Normal breath sounds. No wheezing, rhonchi or rales.   Lymphadenopathy:      Cervical: No cervical adenopathy.     Skin:     Findings: No rash.     Neurological:      Mental Status: She is alert and oriented to person, place, and time.

## 2025-07-27 DIAGNOSIS — E11.9 TYPE 2 DIABETES MELLITUS WITHOUT COMPLICATION, WITHOUT LONG-TERM CURRENT USE OF INSULIN (HCC): ICD-10-CM

## 2025-07-27 RX ORDER — METFORMIN HYDROCHLORIDE 500 MG/1
500 TABLET, EXTENDED RELEASE ORAL
Qty: 90 TABLET | Refills: 1 | Status: SHIPPED | OUTPATIENT
Start: 2025-07-27

## (undated) DEVICE — FINGER TUBING + CABLE MANAGER

## (undated) DEVICE — SUT VICRYL 4-0 PS-2 27 IN J426H

## (undated) DEVICE — LIGHT HANDLE COVER SLEEVE DISP BLUE STELLAR

## (undated) DEVICE — INTENDED FOR TISSUE SEPARATION, AND OTHER PROCEDURES THAT REQUIRE A SHARP SURGICAL BLADE TO PUNCTURE OR CUT.: Brand: BARD-PARKER SAFETY BLADES SIZE 15, STERILE

## (undated) DEVICE — NEEDLE 25G X 1 1/2

## (undated) DEVICE — PAD GROUNDING ADULT

## (undated) DEVICE — GAUZE SPONGES,16 PLY: Brand: CURITY

## (undated) DEVICE — BETHLEHEM UNIVERSAL MINOR GEN: Brand: CARDINAL HEALTH

## (undated) DEVICE — GLOVE SRG BIOGEL 7

## (undated) DEVICE — DRAPE EQUIPMENT RF WAND

## (undated) DEVICE — DRESSING TELFA 2 X 3 IN STRL

## (undated) DEVICE — POOLE SUCTION HANDLE: Brand: CARDINAL HEALTH

## (undated) DEVICE — GLOVE INDICATOR PI UNDERGLOVE SZ 7 BLUE

## (undated) DEVICE — CHLORAPREP HI-LITE 26ML ORANGE

## (undated) DEVICE — SUT VICRYL 3-0 SH 27 IN J416H